# Patient Record
Sex: FEMALE | Race: WHITE | Employment: FULL TIME | URBAN - METROPOLITAN AREA
[De-identification: names, ages, dates, MRNs, and addresses within clinical notes are randomized per-mention and may not be internally consistent; named-entity substitution may affect disease eponyms.]

---

## 2019-02-19 PROBLEM — E78.2 MIXED HYPERLIPIDEMIA: Status: ACTIVE | Noted: 2019-02-19

## 2019-10-21 PROBLEM — R20.2 PARESTHESIA: Status: ACTIVE | Noted: 2019-10-21

## 2019-10-21 PROBLEM — M21.372 FOOT DROP, LEFT FOOT: Status: ACTIVE | Noted: 2019-10-21

## 2019-10-21 PROBLEM — R29.3 POSTURAL IMBALANCE: Status: ACTIVE | Noted: 2019-10-21

## 2019-10-21 PROBLEM — Z79.899 ENCOUNTER FOR MEDICATION MANAGEMENT: Status: ACTIVE | Noted: 2019-10-21

## 2019-10-21 PROBLEM — R29.898 WEAKNESS OF BOTH LEGS: Status: ACTIVE | Noted: 2019-10-21

## 2019-11-01 ENCOUNTER — HOSPITAL ENCOUNTER (OUTPATIENT)
Dept: PHYSICAL THERAPY | Age: 44
Discharge: HOME | End: 2019-11-01
Payer: COMMERCIAL

## 2019-11-01 DIAGNOSIS — R29.3 POSTURAL IMBALANCE: ICD-10-CM

## 2019-11-01 DIAGNOSIS — M21.372 FOOT DROP, LEFT FOOT: ICD-10-CM

## 2019-11-01 PROCEDURE — 97162 PT EVAL MOD COMPLEX 30 MIN: CPT

## 2019-11-01 PROCEDURE — 97110 THERAPEUTIC EXERCISES: CPT

## 2019-11-01 NOTE — THERAPY EVALUATION
Jamin Heredia  : 1975  Payor: [id number] / Plan: [address] / Product Type: PPO /    [address] [address] [facility], [facility], [address] [place] [address]  [phone]   [phone]      Visit Count:  1   OUTPATIENT PHYSICAL THERAPY:Initial Assessment 2019      ICD-10: Treatment Diagnosis:   Difficulty in walking, Not elsewhere classified (R26.2)  Postural imbalance [R29.3]  Foot drop, left foot [M21.372]    Precautions/Allergies:   Patient has no known allergies. MD Orders: evaluate and treat MEDICAL/REFERRING DIAGNOSIS:  Postural imbalance [R29.3]  Foot drop, left foot [M21.372]   DATE OF ONSET: 3 years ago, progressively worsening  REFERRING PHYSICIAN: Anna Hamilton MD  RETURN PHYSICIAN APPOINTMENT: not specified      INITIAL ASSESSMENT:  Ms. Nataliya Hanna presents with functional limitations due to B neuropathy lower legs causing significant weakness affecting gait. PT evaluation reveals severe weakness of left ankle dorsi flexion, restricted ROM of left ankle, decreased sensation of B lower legs as well as absence of DTR's. Some improvement noted through interventions performed today showing pt will benefit from skilled PT and may need orthotic intervention to address current problems return to safe community and home ambulation. PROBLEM LIST (Impacting functional limitations):  1. Decreased Strength  2. Decreased ADL/Functional Activities  3. Decreased Ambulation Ability/Technique  4. Decreased Balance  5. Decreased Flexibility/Joint Mobility  6. Decreased Clinch with Home Exercise Program INTERVENTIONS PLANNED:  1. Cold  2. Electrical Stimulation  3. Gait Training  4. Home Exercise Program (HEP)  5. Manual Therapy  6. Neuromuscular Re-education/Strengthening  7. Range of Motion (ROM)  8. Therapeutic Activites  9. Therapeutic Exercise/Strengthening     TREATMENT PLAN:  Effective Dates: 2019 TO 2019 (60 days). Frequency/Duration: 1-2 times a week for 60 Days    GOALS: (Goals have been discussed and agreed upon with patient.)  SHORT-TERM FUNCTIONAL GOALS: Time Frame: 2-4 weeks   1. Pt will be independent with HEP focusing on LE ROM, strength as well as proprioception. 2. Pt will demonstrate passive left ankle DF to 10 deg to allow proper gait mechanics. DISCHARGE GOALS: Time Frame: 6-8 weeks   1. Pt will improve FAAM score by 10 points. 2. Pt will demonstrate active left ankle DF to 5 degrees to improve gait mechanics. 3. Pt will demonstrate at least 4-/5 left ankle strength all planes. 4. Pt will demonstrate functional heel strike during gait on all surfaces to decrease fall risk. Rehabilitation Potential For Stated Goals: Good  . Ambulatory/Rehab Services H2 Model Falls Risk Assessment    Risk Factors:       No Risk Factors Identified Ability to Rise from Chair:       (0)  Ability to rise in a single movement    Falls Prevention Plan:       No modifications necessary   Total: (5 or greater = High Risk): 0    ©2010 [facility]. All Rights Reserved. [facility] Patent #[id number]. Federal Law prohibits the replication, distribution or use without written permission from [facility]     Outcome Measure: Tool Used: FOOT AND ANKLE ABILITY MEASURE (FAAM)  Score:  Initial: 66/116 Most Recent: X (Date: -- )   Interpretation of Score: For the \"Activities of Daily Living\", there are 21 questions each scored on a 5 point scale with 0 representing \"Unable to do\" and 4 representing \"No difficulty\". The lower the score, the greater the functional disability. 84/84 represents no disability. Minimal detectable change is 5.7 points. With the addition of the 8 questions in the \"Sports Subscale,\" there are 29 questions, each scored on a 5 point scale with 0 representing \"Unable to do\" and 4 representing \"No difficulty\". The lower the score, the greater the functional disability.  116/116 represents no disability. Minimal detectable change is 12.3 points. Medical Necessity:   · Patient is expected to demonstrate progress in strength, range of motion, balance and functional technique to improve safety during gait and ADLs. Reason for Services/Other Comments:  · Patient continues to require modification of therapeutic interventions to increase complexity of exercises. Regarding Yvonne Bello's therapy, I certify that the treatment plan above will be carried out by a therapist or under their direction. Thank you for this referral,  Curvin Apley, PT     Referring Physician Signature: Hemalatha Ward MD              Date                    The information in this section was collected on 2019    (except where otherwise noted). HISTORY:   History of Present Injury/Illness (Reason for Referral):  Pt states hx of uncontrolled diabetes with with neuropathy affecting B lower legs x last 3 years. She has noticed her progressively worsening left sided drop foot affecting gait and balnace. Primary care MD referred pt to neurologist.  NVC performed, no results discussed with patient as of yet. Referred to PT. Pt does state that after nerve stimulation from NVC, she noticed a short term improvement with her gait. She reports occasional falls over last few years, last beign on stairs 4 months ago. Present symptoms (on day of initial evaluation): decreased sensation B lower legs light touch, right ankle weakness, gait disturbance   · Pain level: no significant pain     Past Medical History/Comorbidities:   Ms. Audrey De Guzman  has a past medical history of Diabetic peripheral neuropathy associated with type 2 diabetes mellitus, Nonproliferative diabetic retinopathy, and Type 2 diabetes mellitus, uncontrolled. Ms. Audrey De Guzman  has a past surgical history that includes hx other surgical () and hx  section ( and ).   Social History/Living Environment:    lives with family in 2 story home with basement  Prior Level of Function/Work/Activity:  attendance clerk at [employer]    Dominant Side:         RIGHT  Other Clinical Tests:          NCV  Previous Treatment Approaches:          None   Current Medications:       Current Outpatient Medications:     ergocalciferol (ERGOCALCIFEROL) 50,000 unit capsule, Take 1 Cap by mouth two (2) times a week on Wednesday and Saturday. , Disp: 24 Cap, Rfl: 0    FREESTYLE MILIND 14 DAY READER misc, Use to check blood glucose 4 times daily E11.65, Disp: 1 Each, Rfl: 0    FREESTYLE MILIND 14 DAY SENSOR kit, Use to check blood glucose 4 times daily E11.65, Disp: 2 Kit, Rfl: 11    TRULICITY 8.87 VR/9.6 mL sub-q pen, 0.5 mL by SubCUTAneous route every seven (7) days. , Disp: 8 Pen, Rfl: 11    ONETOUCH VERIO HIGH CONTROL soln, Use with onetouch verio meter,  E11.65, Disp: 1 Each, Rfl: 1    ONETOUCH VERIO MID CONTROL soln, Use with onetouch verio meter,  E11.65, Disp: 1 mL, Rfl: 1    HUMALOG KWIKPEN INSULIN 100 unit/mL kwikpen, Use with correction 3/50 >150, max daily dose 50 units, Disp: 15 Pen, Rfl: 3    metFORMIN ER (GLUCOPHAGE XR) 750 mg tablet, Take 1 Tab by mouth daily. , Disp: 60 Tab, Rfl: 11    TRESIBA FLEXTOUCH U-200 200 unit/mL (3 mL) inpn, 80 Units by SubCUTAneous route daily. , Disp: 12 Pen, Rfl: 3    ondansetron hcl (ZOFRAN) 8 mg tablet, Take 1 Tab by mouth every eight (8) hours as needed for Nausea., Disp: 9 Tab, Rfl: 11    aspirin/acetaminophen/caffeine (EXCEDRIN MIGRAINE PO), Take 1 Tab by mouth as needed. , Disp: , Rfl:     JARDIANCE 10 mg tablet, Take 1 Tab by mouth daily. , Disp: 30 Tab, Rfl: 11    atorvastatin (LIPITOR) 20 mg tablet, Take 1 Tab by mouth daily. , Disp: 30 Tab, Rfl: 11    LOTEMAX 0.5 % ophthalmic suspension, INSTILL 1 TO 2 DROPS IN INTO LEFT EYE EVERY 6 HOURS, Disp: , Rfl: 0    ONETOUCH VERIO strip, Check blood glucose three times daily.   Dx E11.65, Disp: 300 Strip, Rfl: 3    ONE TOUCH DELICA 33 gauge misc, Check blood glucose three times daily. Dx E11.65, Disp: 300 Lancet, Rfl: 3    FRANSISCO PEN NEEDLE 32 gauge x 5/32\" ndle, 2 injections per day. Dx E11.65, Disp: 200 Pen Needle, Rfl: 3    gabapentin (NEURONTIN) 300 mg capsule, Take 1 Cap by mouth two (2) times a day. (Patient taking differently: Take 300 mg by mouth as needed.), Disp: 60 Cap, Rfl: 11   Date Last Reviewed:  11/1/2019     Number of Personal Factors/Comorbidities that affect the Plan of Care: 1-2: MODERATE COMPLEXITY   EXAMINATION:   Observation/Orthostatic Postural Assessment:          Some clawing of right toes with weightbearing         B first toes mild rubor        No edema noted of B lower legs    Palpation:          Decreased sensation, no report of pain. ROM:            Active  Date:  11/1/19 Date:   Date:     Direction  Parameters Parameters Parameters   Hip WNL      Knee  WNL     Ankle DF R: 10  L: - 20 deg     Ankle PF R: 60  L: 60     gastroc  R: WNL   L: tight      soleus R: WNL  L: tight              Strength:            Lower quadrant    DATE  11/1/19 DATE     Hip  R: 4+ all planes   L: 4+ all planes  R:   L:    Knee Flexion  R: 4+  L: 4- R:   L:    Knee Extension  R: 4+   L: 4+ R:   L:    Ankle Dorsiflexion  R: 4+  L: 3- R:   L:   Ankle Plantarflexion  R: 4+  L: 4+ R:  L:   Ankle IV R: 4  L: 3+    Ankle EV R: 4  L: 3    Hallux extension  R: 3+  L: 2      Neurological Screen:         Reflexes:  0 patellar and 0 achilles B        Sensation: light touch deiminished medial, dorsum, and planter aspects B feet    Functional Mobility:         Gait/Ambulation:  Absent initial heel strike, no LOB        Transfers:  No use of UE sit to stand         Bed Mobility:  Independent         Stairs:  Not assessed, subjectively step to gait   Body Structures Involved:  1. Nerves  2. Joints  3. Muscles  4. Ligaments Body Functions Affected:  1. Sensory/Pain  2. Neuromusculoskeletal  3. Movement Related Activities and Participation Affected:  1. Mobility  2. Self Care  3.  Domestic Life   Number of elements that affect the Plan of Care: 3: MODERATE COMPLEXITY   CLINICAL PRESENTATION:   Presentation: Evolving clinical presentation with changing clinical characteristics: MODERATE COMPLEXITY   CLINICAL DECISION MAKING:   Use of outcome tool(s) and clinical judgement create a POC that gives a: Questionable prediction of patient's progress: MODERATE COMPLEXITY

## 2019-11-01 NOTE — PROGRESS NOTES
Rand Wilson  : 1975  [facility]  Secondary:  [address] at . [address]  [address]. [address], [address]  [phone]   [phone]    Visit Count:  1   OUTPATIENT PHYSICAL THERAPY:  Daily Treatment Note  2019     Treatment Diagnosis:   Difficulty in walking, Not elsewhere classified (R26.2)  Postural imbalance [R29.3]  Foot drop, left foot [M21.372]     Precautions/Allergies:   Patient has no known allergies.      MD Orders: evaluate and treat MEDICAL/REFERRING DIAGNOSIS:  Postural imbalance [R29.3]  Foot drop, left foot [M21.372]   DATE OF ONSET: 3 years ago, progressively worsening  REFERRING PHYSICIAN: Anastasiia Villalpando MD  RETURN PHYSICIAN APPOINTMENT: not specified      Pre-treatment Symptoms/Complaints:  Gait and balance deficit due to drop foot   Pain: Initial:   0 Post Session:  0/10   Medications Last Reviewed:  2019    Updated Objective Findings:  see evaluation      TREATMENT:     THERAPEUTIC EXERCISE: (20 minutes):  Exercises per grid below to improve mobility, strength and balance. Required moderate visual, verbal, manual and tactile cues to promote proper body alignment, promote proper body posture and promote proper body mechanics. Progressed resistance, range and repetitions as indicated. Date:  2019   Date:   Date:     Activity/Exercise Parameters Parameters Parameters   Education  POC, HEP     Ankle AROM all planes  B x 20      Toe towel crunch  X 30 sec B      gastroc stretch X 20 sec B     Soleus stretch  X 20 sec B                     [employer] Portal  Treatment/Session Summary:    · Response to Treatment:  good understading of all above. .  · Communication/Consultation:  evaluation sent to MD   · Equipment provided today:  handout for HEP        Recommendations/Intent for next treatment session: Next visit will focus on ankle and foot mobiltiy, gait, nerve stim.         Treatment Plan of Care Effective Dates: 11/1/19 to 12/31/2019          Total Treatment Billable Duration:  20 min + evaluation    PT Patient Time In/Time Out  Time In: 0930  Time Out: [address], PT    Future Appointments   Date Time Provider Liliam Jovel   11/5/2019  4:00 PM Maurilio Opitz END BS ENDO   11/8/2019 10:30 AM [facility] Atwood[facility]   11/15/2019 10:30 AM [facility]   11/22/2019 10:30 AM Ghassan Atwood, PT [facility]   11/27/2019  1:00 PM Briana Gomez, PT [facility]

## 2019-11-08 ENCOUNTER — HOSPITAL ENCOUNTER (OUTPATIENT)
Dept: PHYSICAL THERAPY | Age: 44
Discharge: HOME | End: 2019-11-08
Payer: COMMERCIAL

## 2019-11-08 PROCEDURE — 97032 APPL MODALITY 1+ESTIM EA 15: CPT

## 2019-11-08 PROCEDURE — 97110 THERAPEUTIC EXERCISES: CPT

## 2019-11-08 NOTE — PROGRESS NOTES
Joshua Pride  : 1975  Primary: Mekhi Hubbard [facility]  Secondary:  [address] at . [address]  [address]. [address], [address]  [phone]   [phone]    Visit Count:  2   OUTPATIENT PHYSICAL THERAPY:  Daily Treatment Note  2019     Treatment Diagnosis:   Difficulty in walking, Not elsewhere classified (R26.2)  Postural imbalance [R29.3]  Foot drop, left foot [M21.372]     Precautions/Allergies:   Patient has no known allergies.      MD Orders: evaluate and treat MEDICAL/REFERRING DIAGNOSIS:  Postural imbalance [R29.3]  Foot drop, left foot [M21.372]   DATE OF ONSET: 3 years ago, progressively worsening  REFERRING PHYSICIAN: Johnny Nuñez MD  RETURN PHYSICIAN APPOINTMENT: not specified      Pre-treatment Symptoms/Complaints: pt states that she got to HEP at least 1 time per day. Isidore  yesterday when was not paying attention and didn't realize ac curb was near her. Abrasions noted of left anterolateral ankle, covered with island bandage   Pain: Initial:   0 Post Session:  0/10   Medications Last Reviewed:  2019    Updated Objective Findings:  see evaluation      TREATMENT:     THERAPEUTIC EXERCISE: (40 minutes):  Exercises per grid below to improve mobility, strength and balance. Required moderate visual, verbal, manual and tactile cues to promote proper body alignment, promote proper body posture and promote proper body mechanics. Progressed resistance, range and repetitions as indicated.    Date:  2019   Date:  19 Date:     Activity/Exercise Parameters Parameters Parameters   Education  POC, HEP Weight bearing, and gait mechanics     Ankle AROM all planes  B x 20  On baps board     Toe towel crunch  X 30 sec B      gastroc stretch X 20 sec B On slant board     Soleus stretch  X 20 sec B     Slant board  2 x 30 sec     baps board  DF/PF x 10     nustep   X 10 min     Step taps  X 10 B, working on balance as well as functional ankle DF Proprioception activities   50%-75% weightbearing through left LE with eyes closed and finger touch on elevated table. Gait   100ft level ground focus on heel strike     Calf raise  Seated x 10 B       Modalities: 20 min (with set up)  [place] E-stim x 10 min over left anterior tib motor points to promote activation and strength of muscle. · 1 channel, 2 electrodes, prox ant tib and 3 inches distal, 100 mAmp, 3 sec ramp, 5 sec on, 10 sec off    [employer] Portal  Treatment/Session Summary:    · Response to Treatment:  Pt demonstrated improved heel strike left lower leg after E-stim. Above exercises fatiguing . Pt required multiple cues to decrease right toe clawing during standing activities. Static standing exercise was challenging with increased weight bearing through left LE---poor ankle strategy. · Communication/Consultation:  Encouraged continue HEP as well as focus on gait and static standing weightbearing (equal)  · Equipment provided today:  none      Recommendations/Intent for next treatment session: Next visit will focus on functional ankle and foot mobiltiy/strength as [place] stim as needed for nerve stim.       Treatment Plan of Care Effective Dates:  11/1/19 to 12/31/2019      Total Treatment Billable Duration:   60 min   PT Patient Time In/Time Out  Time In: 1030  Time Out: [address] PT    Future Appointments   Date Time Provider Liliam Jovel   11/15/2019 10:30 AM Ursula Hernandez PT [facility]   11/22/2019 10:30 AM [facility]   11/27/2019  1:00 PM [facility]   2/12/2020 11:00 AM Angélica Busch, PABOB END BS ENDO

## 2019-11-15 ENCOUNTER — HOSPITAL ENCOUNTER (OUTPATIENT)
Dept: PHYSICAL THERAPY | Age: 44
Discharge: HOME | End: 2019-11-15
Payer: COMMERCIAL

## 2019-11-15 NOTE — PROGRESS NOTES
Jose Cruz Curry  : 1975  [facility]  Secondary:  [address] at . [address]  [address]. [address], [address]  [phone]   [phone]        OUTPATIENT DAILY NOTE    NAME/AGE/GENDER: Jose Cruz Curry is a 38 y.o. female. DATE: 11/15/2019    Ms. Radha Myers for today's appointment due to unknown reason.     Robbie Hampton, PT

## 2019-11-22 ENCOUNTER — HOSPITAL ENCOUNTER (OUTPATIENT)
Dept: PHYSICAL THERAPY | Age: 44
Discharge: HOME | End: 2019-11-22
Payer: COMMERCIAL

## 2019-11-22 PROCEDURE — 97140 MANUAL THERAPY 1/> REGIONS: CPT

## 2019-11-22 PROCEDURE — 97110 THERAPEUTIC EXERCISES: CPT

## 2019-11-22 NOTE — PROGRESS NOTES
Josh Schreiber  : 1975  Primary: Erma [facility]acon [facility]  Secondary:  [address] at . [address]  [address]. [address], [address]  [phone]   [phone]    Visit Count:  3   OUTPATIENT PHYSICAL THERAPY:  Daily Treatment Note  2019     Treatment Diagnosis:   Difficulty in walking, Not elsewhere classified (R26.2)  Postural imbalance [R29.3]  Foot drop, left foot [M21.372]     Precautions/Allergies:   Patient has no known allergies.      MD Orders: evaluate and treat MEDICAL/REFERRING DIAGNOSIS:  Postural imbalance [R29.3]  Foot drop, left foot [M21.372]   DATE OF ONSET: 3 years ago, progressively worsening  REFERRING PHYSICIAN: Eva Garcia MD  RETURN PHYSICIAN APPOINTMENT: not specified      Pre-treatment Symptoms/Complaints: pt states that she is trying to stay consistent with HEP but very busy at home. Reports missed last appointment due to work engagement. Pain: Initial:   0 Post Session:  0/10   Medications Last Reviewed:  2019    Updated Objective Findings:  see evaluation      TREATMENT:     THERAPEUTIC EXERCISE: (35 minutes):  Exercises per grid below to improve mobility, strength and balance. Required moderate visual, verbal, manual and tactile cues to promote proper body alignment, promote proper body posture and promote proper body mechanics. Progressed resistance, range and repetitions as indicated.    Date:  2019   Date:  19 Date:  19   Activity/Exercise Parameters Parameters Parameters   Education  POC, HEP Weight bearing, and gait mechanics     Ankle AROM all planes  B x 20  On baps board  With YTB DF and PF B ( left without band DF)   Toe towel crunch  X 30 sec B   2 x 1 min B   gastroc stretch X 20 sec B On slant board  On slant board 2 x 30 sec    Soleus stretch  X 20 sec B     Slant board  2 x 30 sec  2 x 20 sec    baps board  DF/PF x 10     nustep   X 10 min  X 10 min    Step taps  X 10 B, working on balance as well as functional ankle DF    Proprioception activities   50%-75% weightbearing through left LE with eyes closed and finger touch on elevated table. Gait   100ft level ground focus on heel strike     Calf raise  Seated x 10 B     Towel IV/EV   2 x 1 min    Squats    NV   Side steps with band    X 5 min    Manual: (8 min): to improve joint mechinics and increase ROM   Hallux extension stretching  Manual OP with ankle DF     Modalities: 0 min (with set up)----not today  [place] E-stim x 10 min over left anterior tib motor points to promote activation and strength of muscle. · 1 channel, 2 electrodes, prox ant tib and 3 inches distal, 100 mAmp, 3 sec ramp, 5 sec on, 10 sec off    [employer] Portal  Treatment/Session Summary:    · Response to Treatment:   Pt reports quick fatigue of B LE with exercise today. Pt continues to present with slow healing wound on ankle--observation: open, min yellow drainage. Encouraged pt to keep covered if activity draining/open. Today I covered wound with sterile absorbant nonadhesive pad and wrapped with Kerlix secured with tape. Discussed keeping aware of wound and alerting MD if changes appearance, smell or drainage. · Communication/Consultation:  Encouraged continue HEP as well as focus on gait and static standing weightbearing (equal)  · Equipment provided today:  t-band and HEP handout       Recommendations/Intent for next treatment session: Next visit will focus on functional ankle and foot mobiltiy/strength as [place] stim (?)as needed for nerve stim.       Treatment Plan of Care Effective Dates:  11/1/19 to 12/31/2019      Total Treatment Billable Duration:  43 min   PT Patient Time In/Time Out  Time In: 1030  Time Out: [address], PT    Future Appointments   Date Time Provider Liliam Jovel   11/27/2019  1:00 PM Dinorah Mccarthy, PT [facility]   2/12/2020 11:00 AM Liliana Busch PA-C END BS ENDO

## 2019-11-27 ENCOUNTER — HOSPITAL ENCOUNTER (OUTPATIENT)
Dept: PHYSICAL THERAPY | Age: 44
Discharge: HOME | End: 2019-11-27
Payer: COMMERCIAL

## 2019-11-27 PROCEDURE — 97110 THERAPEUTIC EXERCISES: CPT

## 2019-11-27 NOTE — PROGRESS NOTES
Saskia Scott  : 1975  Primary: [facility]  Secondary:  [address] at . Mikala [address]  [address]. [address], [email or web address]  [phone]   [phone]    Visit Count:  4   OUTPATIENT PHYSICAL THERAPY:  Daily Treatment Note  2019     Treatment Diagnosis:   Difficulty in walking, Not elsewhere classified (R26.2)  Postural imbalance [R29.3]  Foot drop, left foot [M21.372]     Precautions/Allergies:   Patient has no known allergies.      MD Orders: evaluate and treat MEDICAL/REFERRING DIAGNOSIS:  Postural imbalance [R29.3]  Foot drop, left foot [M21.372]   DATE OF ONSET: 3 years ago, progressively worsening  REFERRING PHYSICIAN: Zhang Massey MD  RETURN PHYSICIAN APPOINTMENT: not specified      Pre-treatment Symptoms/Complaints: pt states that she is consistent with HEP but still feels weak of LE and has to really focus to walk correctly. She feels like she is more aware of her gait mechanics but does not feel like she is making much progress. She states that she is always very sore of entire LE's few days after PT sessions. Pain: Initial:   0 Post Session:  0/10   Medications Last Reviewed:  2019    Updated Objective Findings:  see evaluation      TREATMENT:     THERAPEUTIC EXERCISE: (40 minutes):  Exercises per grid below to improve mobility, strength and balance. Required moderate visual, verbal, manual and tactile cues to promote proper body alignment, promote proper body posture and promote proper body mechanics. Progressed resistance, range and repetitions as indicated.    Date:  2019   Date:  19 Date:  19   Activity/Exercise Parameters Parameters Parameters    Education  POC, HEP Weight bearing, and gait mechanics   HEP--adding 20 min of cardio every day (bike or elliptical)    Ankle AROM all planes  B x 20  On baps board  With YTB DF and PF B ( left without band DF)    Toe towel crunch  X 30 sec B   2 x 1 min B gastroc stretch X 20 sec B On slant board  On slant board 2 x 30 sec  On slant board   2 x 30 sec    Soleus stretch  X 20 sec B   On slant board 2 x 30 sec    Slant board  2 x 30 sec  2 x 20 sec     baps board  DF/PF x 10      nustep   X 10 min  X 10 min  10 min    Step taps  X 10 B, working on balance as well as functional ankle DF     Proprioception activities   50%-75% weightbearing through left LE with eyes closed and finger touch on elevated table. Gait   100ft level ground focus on heel strike   4 x 40 ft    Calf raise  Seated x 10 B   On shuttle    Towel IV/EV   2 x 1 min     Squats    NV On shuttle   Side steps with band    X 5 min     Shuttle     Leg press: 50# DL x 20   50# SL x 10     Calf raise:   50# x 20                         Manual: (0min): to improve joint mechinics and increase ROM   · Hallux extension stretching  · Manual OP with ankle DF     Modalities: 0 min (with set up)----not today  [place] E-stim x 10 min over left anterior tib motor points to promote activation and strength of muscle. · 1 channel, 2 electrodes, prox ant tib and 3 inches distal, 100 mAmp, 3 sec ramp, 5 sec on, 10 sec off    [employer] Portal  Treatment/Session Summary:    · Response to Treatment:  Pt continues to report significant fatigue of B LE after minimal activities. Pt demonstrates ability to ambulate with good heel strike B x 100 ft on level surface prior to fatigue of dorsiflexors--this is a big improvement from initial evaluation. .     · Communication/Consultation:  Discussed continuing PT x 2-4 more weeks due to evidence of further potential progress. Reinforced need for consistency with HEP during this time as well  · Equipment provided today:        Recommendations/Intent for next treatment session: continue to focus on LE strength and endurance as well as overall balance and motor control.        Treatment Plan of Care Effective Dates:  11/1/19 to 12/31/2019      Total Treatment Billable Duration: 40 min   PT Patient Time In/Time Out  Time In: 1300  Time Out: [id number], PT    Future Appointments   Date Time Provider Liliam Jovel   12/11/2019 10:30 AM Dinorah Mccarthy PT [facility]   12/18/2019 10:30 AM Dinorah Mccarthy PT [facility]   2/12/2020 11:00 AM Liliana Busch PA-C END [facility] END[facility]

## 2019-12-11 ENCOUNTER — HOSPITAL ENCOUNTER (OUTPATIENT)
Dept: PHYSICAL THERAPY | Age: 44
Discharge: HOME | End: 2019-12-11
Payer: COMMERCIAL

## 2019-12-11 PROCEDURE — 97110 THERAPEUTIC EXERCISES: CPT

## 2019-12-11 NOTE — PROGRESS NOTES
Johanna Grayson  : 1975  [facility]  Secondary:  [address] at . Mik[address]  [address]. [address], [address]  [phone]   [phone]    Visit Count:  5   OUTPATIENT PHYSICAL THERAPY:  Daily Treatment Note  2019     Treatment Diagnosis:   Difficulty in walking, Not elsewhere classified (R26.2)  Postural imbalance [R29.3]  Foot drop, left foot [M21.372]     Precautions/Allergies:   Patient has no known allergies.      MD Orders: evaluate and treat MEDICAL/REFERRING DIAGNOSIS:  Postural imbalance [R29.3]  Foot drop, left foot [M21.372]   DATE OF ONSET: 3 years ago, progressively worsening  REFERRING PHYSICIAN: Aaron Gutierres MD  RETURN PHYSICIAN APPOINTMENT: not specified      Pre-treatment Symptoms/Complaints: pt with no new c/os. Been working on [employer] consistently. Pain: Initial:   0 Post Session:  0/10   Medications Last Reviewed:  2019    Updated Objective Findings:  see evaluation      TREATMENT:     THERAPEUTIC EXERCISE: (40 minutes):  Exercises per grid below to improve mobility, strength and balance. Required moderate visual, verbal, manual and tactile cues to promote proper body alignment, promote proper body posture and promote proper body mechanics. Progressed resistance, range and repetitions as indicated.    Date:  2019   Date:  19 Date:  19   Activity/Exercise Parameters Parameters Parameters     Education  POC, HEP Weight bearing, and gait mechanics   HEP--adding 20 min of cardio every day (bike or elliptical)     Ankle AROM all planes  B x 20  On baps board  With YTB DF and PF B ( left without band DF)    DF seated    Toe towel crunch  X 30 sec B   2 x 1 min B     gastroc stretch X 20 sec B On slant board  On slant board 2 x 30 sec  On slant board   2 x 30 sec  On slant board x 20 sec    Soleus stretch  X 20 sec B   On slant board 2 x 30 sec  On slant board 2 x 30 sec    Slant board 2 x 30 sec  2 x 20 sec      baps board  DF/PF x 10    X DF/PF, IV/EV   nustep   X 10 min  X 10 min  10 min  10 min level 4   Step taps  X 10 B, working on balance as well as functional ankle DF      Proprioception activities   50%-75% weightbearing through left LE with eyes closed and finger touch on elevated table. BF static standing: EO/EC, WBOS and NBOS total of 6 min    Gait   100ft level ground focus on heel strike   4 x 40 ft  4 x 40 ft    Calf raise  Seated x 10 B   On shuttle     Towel IV/EV   2 x 1 min      Squats    NV On shuttle    Side steps with band    X 5 min      Shuttle     Leg press: 50# DL x 20   50# SL x 10     Calf raise:   50# x 20     Squats      With towel roll under toes for hallux extension and AP grade IV mobilization   X 10                   Manual: (0min): to improve joint mechinics and increase ROM   · Hallux extension stretching  · Manual OP with ankle DF     Modalities: 0 min (with set up)----not today  [place] E-stim x 10 min over left anterior tib motor points to promote activation and strength of muscle. · 1 channel, 2 electrodes, prox ant tib and 3 inches distal, 100 mAmp, 3 sec ramp, 5 sec on, 10 sec off    [employer] Portal  Treatment/Session Summary:    · Response to Treatment:  Pt continues to report significant fatigue of B LE after minimal activities. She demonstrates improved DF of left ankle. Balance activities challenging and needed cuing to increase weight bearing through heels and right LE as well as decrease clawing of right toes. .     · Communication/Consultation:    · Equipment provided today:  Non      Recommendations/Intent for next treatment session: continue to focus on LE strength and endurance as well as overall balance and motor control.        Treatment Plan of Care Effective Dates:  11/1/19 to 12/31/2019    Total Treatment Billable Duration:  40 min   PT Patient Time In/Time Out  Time In: 1030  Time Out: [address], PT    Future Appointments   Date Time Provider Liliam Jovel   12/18/2019 10:30 AM Nilo Zavala PT [facility]   2/12/2020 11:00 AM China Busch PA-C [facility]

## 2019-12-18 ENCOUNTER — HOSPITAL ENCOUNTER (OUTPATIENT)
Dept: PHYSICAL THERAPY | Age: 44
Discharge: HOME | End: 2019-12-18
Payer: COMMERCIAL

## 2019-12-18 PROCEDURE — 97110 THERAPEUTIC EXERCISES: CPT

## 2019-12-18 NOTE — PROGRESS NOTES
Ofelia Reyes  : 1975  Primary: Srinath Rios  Secondary:  [address] at . [address]  [address]. [address], [address]  [phone]   [phone]    Visit Count:  6   OUTPATIENT PHYSICAL THERAPY:  Daily Treatment Note  2019     Treatment Diagnosis:   Difficulty in walking, Not elsewhere classified (R26.2)  Postural imbalance [R29.3]  Foot drop, left foot [M21.372]     Precautions/Allergies:   Patient has no known allergies.      MD Orders: evaluate and treat MEDICAL/REFERRING DIAGNOSIS:  Postural imbalance [R29.3]  Foot drop, left foot [M21.372]   DATE OF ONSET: 3 years ago, progressively worsening  REFERRING PHYSICIAN: Monty Carver MD  RETURN PHYSICIAN APPOINTMENT: not specified      Pre-treatment Symptoms/Complaints: Have been sick so her balance has been off this week. No significant changes at this time. Doing HEP but not cardio yet because cannot get to her ecliptical. Pt states that she is doing other exercises consistently. She also reports that she is going out of town until January and will perform her HEP on her own and call MD if needs to return      Pain: Initial:   0 Post Session:  0/10   Medications Last Reviewed:  2019    Updated Objective Findings:  see evaluation      TREATMENT:     THERAPEUTIC EXERCISE: (40 minutes):  Exercises per grid below to improve mobility, strength and balance. Required moderate visual, verbal, manual and tactile cues to promote proper body alignment, promote proper body posture and promote proper body mechanics. Progressed resistance, range and repetitions as indicated.    Date:  2019   Date:  19 Date:  19   Activity/Exercise Parameters Parameters Parameters      Education  POC, HEP Weight bearing, and gait mechanics   HEP--adding 20 min of cardio every day (bike or elliptical)      Ankle AROM all planes  B x 20  On baps board  With YTB DF and PF B ( left without band DF)    DF seated  With wobble board x 10 min    Toe towel crunch  X 30 sec B   2 x 1 min B      gastroc stretch X 20 sec B On slant board  On slant board 2 x 30 sec  On slant board   2 x 30 sec  On slant board x 20 sec     Soleus stretch  X 20 sec B   On slant board 2 x 30 sec  On slant board 2 x 30 sec     Slant board  2 x 30 sec  2 x 20 sec       baps board  DF/PF x 10    X DF/PF, IV/EV All planes ankle    nustep   X 10 min  X 10 min  10 min  10 min level 4 10 min level 4    Step taps  X 10 B, working on balance as well as functional ankle DF    Step ups x 20    Proprioception activities   50%-75% weightbearing through left LE with eyes closed and finger touch on elevated table. BF static standing: EO/EC, WBOS and NBOS total of 6 min     Gait   100ft level ground focus on heel strike   4 x 40 ft  4 x 40 ft  4 x 40 ft   Calf raise  Seated x 10 B   On shuttle      Towel IV/EV   2 x 1 min       Squats    NV On shuttle     Side steps with band    X 5 min       Shuttle     Leg press: 50# DL x 20   50# SL x 10     Calf raise:   50# x 20      Squats      With towel roll under toes for hallux extension and AP grade IV mobilization   X 10 X 10    Ladder drills       Weaving, fwd, side step x 10 min    Heel and toe walk      2 x  40ft      [facility] Portal  Treatment/Session Summary:    · Response to Treatment:  Pt continues to report significant fatigue of B LE after minimal activities, encouraged pt to continue performing difficult activiteis at home to build endurance. Cuing also needed to decrease hyper extension of knees and increase heel strike with walking.    .     ·        Recommendations/Intent for next treatment session: discharged today       Treatment Plan of Care Effective Dates:  11/1/19 to 12/31/2019    Total Treatment Billable Duration:  40 min      Sneha Vazquez PT    Future Appointments   Date Time Provider Liliam Jovel   12/18/2019 10:30 AM Miryam Delatorre, PT [facility]   2/12/2020 11:00 AM Yolanda Busch PA-C [facility]

## 2020-03-12 ENCOUNTER — HOSPITAL ENCOUNTER (EMERGENCY)
Age: 45
Discharge: HOME | End: 2020-03-12
Attending: EMERGENCY MEDICINE
Payer: COMMERCIAL

## 2020-03-12 ENCOUNTER — APPOINTMENT (OUTPATIENT)
Dept: GENERAL RADIOLOGY | Age: 45
End: 2020-03-12
Attending: EMERGENCY MEDICINE
Payer: COMMERCIAL

## 2020-03-12 VITALS
DIASTOLIC BLOOD PRESSURE: 79 MMHG | OXYGEN SATURATION: 98 % | HEART RATE: 91 BPM | RESPIRATION RATE: 18 BRPM | SYSTOLIC BLOOD PRESSURE: 135 MMHG | BODY MASS INDEX: 32.54 KG/M2 | WEIGHT: 214 LBS | TEMPERATURE: 98 F

## 2020-03-12 DIAGNOSIS — L03.031 CELLULITIS OF TOE OF RIGHT FOOT: Primary | ICD-10-CM

## 2020-03-12 DIAGNOSIS — R73.9 HYPERGLYCEMIA: ICD-10-CM

## 2020-03-12 LAB
ALBUMIN SERPL-MCNC: 3.5 G/DL (ref 3.5–5)
ALBUMIN/GLOB SERPL: 0.8 {RATIO} (ref 1.2–3.5)
ALP SERPL-CCNC: 86 U/L (ref 50–136)
ALT SERPL-CCNC: 17 U/L (ref 12–65)
ANION GAP SERPL CALC-SCNC: 7 MMOL/L (ref 7–16)
AST SERPL-CCNC: 19 U/L (ref 15–37)
BASOPHILS # BLD: 0.1 K/UL (ref 0–0.2)
BASOPHILS NFR BLD: 1 % (ref 0–2)
BILIRUB SERPL-MCNC: 0.4 MG/DL (ref 0.2–1.1)
BUN SERPL-MCNC: 8 MG/DL (ref 6–23)
CALCIUM SERPL-MCNC: 8.7 MG/DL (ref 8.3–10.4)
CHLORIDE SERPL-SCNC: 97 MMOL/L (ref 98–107)
CO2 SERPL-SCNC: 26 MMOL/L (ref 21–32)
CREAT SERPL-MCNC: 0.94 MG/DL (ref 0.6–1)
CRP SERPL-MCNC: 3.5 MG/DL (ref 0–0.9)
DIFFERENTIAL METHOD BLD: NORMAL
EOSINOPHIL # BLD: 0.4 K/UL (ref 0–0.8)
EOSINOPHIL NFR BLD: 6 % (ref 0.5–7.8)
ERYTHROCYTE [DISTWIDTH] IN BLOOD BY AUTOMATED COUNT: 14 % (ref 11.9–14.6)
GLOBULIN SER CALC-MCNC: 4.5 G/DL (ref 2.3–3.5)
GLUCOSE BLD STRIP.AUTO-MCNC: 209 MG/DL (ref 65–100)
GLUCOSE BLD STRIP.AUTO-MCNC: 486 MG/DL (ref 65–100)
GLUCOSE SERPL-MCNC: 483 MG/DL (ref 65–100)
HCT VFR BLD AUTO: 38.9 % (ref 35.8–46.3)
HGB BLD-MCNC: 13.2 G/DL (ref 11.7–15.4)
IMM GRANULOCYTES # BLD AUTO: 0 K/UL (ref 0–0.5)
IMM GRANULOCYTES NFR BLD AUTO: 0 % (ref 0–5)
LYMPHOCYTES # BLD: 1.7 K/UL (ref 0.5–4.6)
LYMPHOCYTES NFR BLD: 28 % (ref 13–44)
MCH RBC QN AUTO: 29.7 PG (ref 26.1–32.9)
MCHC RBC AUTO-ENTMCNC: 33.9 G/DL (ref 31.4–35)
MCV RBC AUTO: 87.4 FL (ref 79.6–97.8)
MONOCYTES # BLD: 0.4 K/UL (ref 0.1–1.3)
MONOCYTES NFR BLD: 7 % (ref 4–12)
NEUTS SEG # BLD: 3.5 K/UL (ref 1.7–8.2)
NEUTS SEG NFR BLD: 58 % (ref 43–78)
NRBC # BLD: 0 K/UL (ref 0–0.2)
PLATELET # BLD AUTO: 258 K/UL (ref 150–450)
PMV BLD AUTO: 9.8 FL (ref 9.4–12.3)
POTASSIUM SERPL-SCNC: 4.6 MMOL/L (ref 3.5–5.1)
PROT SERPL-MCNC: 8 G/DL (ref 6.3–8.2)
RBC # BLD AUTO: 4.45 M/UL (ref 4.05–5.2)
SODIUM SERPL-SCNC: 130 MMOL/L (ref 136–145)
WBC # BLD AUTO: 6.1 K/UL (ref 4.3–11.1)

## 2020-03-12 PROCEDURE — 74011250636 HC RX REV CODE- 250/636: Performed by: EMERGENCY MEDICINE

## 2020-03-12 PROCEDURE — 99284 EMERGENCY DEPT VISIT MOD MDM: CPT

## 2020-03-12 PROCEDURE — 85025 COMPLETE CBC W/AUTO DIFF WBC: CPT

## 2020-03-12 PROCEDURE — 82962 GLUCOSE BLOOD TEST: CPT

## 2020-03-12 PROCEDURE — 96376 TX/PRO/DX INJ SAME DRUG ADON: CPT

## 2020-03-12 PROCEDURE — 96361 HYDRATE IV INFUSION ADD-ON: CPT

## 2020-03-12 PROCEDURE — 80053 COMPREHEN METABOLIC PANEL: CPT

## 2020-03-12 PROCEDURE — 74011000258 HC RX REV CODE- 258: Performed by: EMERGENCY MEDICINE

## 2020-03-12 PROCEDURE — 96365 THER/PROPH/DIAG IV INF INIT: CPT

## 2020-03-12 PROCEDURE — 74011636637 HC RX REV CODE- 636/637: Performed by: EMERGENCY MEDICINE

## 2020-03-12 PROCEDURE — 73630 X-RAY EXAM OF FOOT: CPT

## 2020-03-12 PROCEDURE — 86140 C-REACTIVE PROTEIN: CPT

## 2020-03-12 RX ORDER — AMOXICILLIN AND CLAVULANATE POTASSIUM 875; 125 MG/1; MG/1
1 TABLET, FILM COATED ORAL 2 TIMES DAILY
Qty: 20 TAB | Refills: 0 | Status: SHIPPED | OUTPATIENT
Start: 2020-03-12 | End: 2020-03-22

## 2020-03-12 RX ADMIN — SODIUM CHLORIDE 1.5 G: 900 INJECTION, SOLUTION INTRAVENOUS at 17:35

## 2020-03-12 RX ADMIN — SODIUM CHLORIDE 1000 ML: 900 INJECTION, SOLUTION INTRAVENOUS at 16:21

## 2020-03-12 RX ADMIN — SODIUM CHLORIDE 1000 ML: 900 INJECTION, SOLUTION INTRAVENOUS at 17:34

## 2020-03-12 RX ADMIN — INSULIN HUMAN 10 UNITS: 100 INJECTION, SOLUTION PARENTERAL at 17:35

## 2020-03-12 NOTE — ED NOTES
I have reviewed discharge instructions with the patient. The patient verbalized understanding. Patient left ED via Discharge Method: ambulatory to Home with . Opportunity for questions and clarification provided. Patient given 1 scripts. To continue your aftercare when you leave the hospital, you may receive an automated call from our care team to check in on how you are doing. This is a free service and part of our promise to provide the best care and service to meet your aftercare needs.  If you have questions, or wish to unsubscribe from this service please call [phone]. Thank you for Choosing our [address] [place] [address] Emergency Department.

## 2020-03-12 NOTE — ED PROVIDER NOTES
Patient presents to the ER complaint of right foot pain and ulceration. Patient states she believes her shoe rubbed her foot approximately 3 days ago. Has noticed increased redness and swelling over the past 4 to 5 days. Denies any pain but reports she rarely feels pain in this area secondary to her diabetes. States her blood sugars have been not well controlled. The history is provided by the patient. Foot Ulcer    This is a new problem. The current episode started more than 2 days ago. The problem has not changed since onset. The pain is present in the right toes and right foot. The quality of the pain is described as aching. The pain is at a severity of 4/10. The pain is moderate. Pertinent negatives include no numbness, no stiffness, no itching and no back pain. She has tried nothing for the symptoms.         Past Medical History:   Diagnosis Date    Diabetic peripheral neuropathy associated with type 2 diabetes mellitus     Nonproliferative diabetic retinopathy     Type 2 diabetes mellitus, uncontrolled        Past Surgical History:   Procedure Laterality Date    HX  SECTION   and     HX OTHER SURGICAL      cyst removed from neck         Family History:   Problem Relation Age of Onset    Diabetes Maternal Grandfather     Cancer Maternal Grandfather         lung (smoker)    Breast Cancer Maternal Grandmother     Crohn's Disease Paternal Grandmother     Cancer Paternal Grandfather         lung (smoker)       Social History     Socioeconomic History    Marital status:      Spouse name: Not on file    Number of children: Not on file    Years of education: Not on file    Highest education level: Not on file   Occupational History    Not on file   Social Needs    Financial resource strain: Not on file    Food insecurity     Worry: Not on file     Inability: Not on file    Transportation needs     Medical: Not on file     Non-medical: Not on file   Tobacco Use    Smoking status: Never Smoker    Smokeless tobacco: Never Used   Substance and Sexual Activity    Alcohol use: No     Alcohol/week: 0.0 standard drinks    Drug use: No    Sexual activity: Not on file   Lifestyle    Physical activity     Days per week: Not on file     Minutes per session: Not on file    Stress: Not on file   Relationships    Social connections     Talks on phone: Not on file     Gets together: Not on file     Attends Taoist service: Not on file     Active member of club or organization: Not on file     Attends meetings of clubs or organizations: Not on file     Relationship status: Not on file    Intimate partner violence     Fear of current or ex partner: Not on file     Emotionally abused: Not on file     Physically abused: Not on file     Forced sexual activity: Not on file   Other Topics Concern    Not on file   Social History Narrative    Not on file         ALLERGIES: Patient has no known allergies. Review of Systems   Constitutional: Negative for fatigue and fever. HENT: Negative for congestion. Eyes: Negative for photophobia and redness. Respiratory: Negative for cough, chest tightness and stridor. Cardiovascular: Negative for palpitations and leg swelling. Gastrointestinal: Negative for abdominal pain. Endocrine: Negative for polydipsia, polyphagia and polyuria. Genitourinary: Negative for urgency. Musculoskeletal: Negative for back pain and stiffness. Skin: Positive for color change. Negative for itching. Neurological: Negative for syncope and numbness. Hematological: Negative for adenopathy. Psychiatric/Behavioral: Negative for behavioral problems and confusion. All other systems reviewed and are negative. Vitals:    03/12/20 1521   BP: 113/77   Pulse: 95   Resp: 16   Temp: 98 °F (36.7 °C)   SpO2: 100%   Weight: 97.1 kg (214 lb)            Physical Exam  Vitals signs and nursing note reviewed.    Constitutional:       Appearance: Normal appearance. HENT:      Head: Normocephalic and atraumatic. Nose: Nose normal. No congestion or rhinorrhea. Neck:      Musculoskeletal: Normal range of motion and neck supple. Cardiovascular:      Rate and Rhythm: Normal rate and regular rhythm. Pulses: Normal pulses. Heart sounds: Normal heart sounds. Pulmonary:      Effort: Pulmonary effort is normal.      Breath sounds: Normal breath sounds. Abdominal:      General: Abdomen is flat. Palpations: Abdomen is soft. Musculoskeletal:         General: No swelling or tenderness. Feet:    Skin:     General: Skin is warm. Capillary Refill: Capillary refill takes less than 2 seconds. Coloration: Skin is not jaundiced. Neurological:      General: No focal deficit present. Mental Status: She is alert. MDM  Number of Diagnoses or Management Options  Diagnosis management comments: Concern for cellulitis, will obtain x-ray as well as basic labs to rule out any gross signs of osteomyelitis. Will check labs as well to rule out DKA    6:59 PM  Normal white count. ERP mildly elevated at 3.5. Initial blood sugar was 46 without any signs of DKA. X-ray shows no gross signs osteomyelitis, blood sugar improved with IV fluids and insulin. Plan to discharge home with antibiotics. She was given Unasyn here.     Urged close follow-up with PCP as well as monitor blood sugars at home       Amount and/or Complexity of Data Reviewed  Clinical lab tests: ordered and reviewed  Tests in the radiology section of CPT®: ordered and reviewed    Risk of Complications, Morbidity, and/or Mortality  Presenting problems: moderate  Diagnostic procedures: moderate  Management options: moderate    Patient Progress  Patient progress: stable         Procedures      Results Include:    Recent Results (from the past 24 hour(s))   GLUCOSE, POC    Collection Time: 03/12/20  3:23 PM   Result Value Ref Range    Glucose (POC) 486 (HH) 65 - 100 mg/dL CBC WITH AUTOMATED DIFF    Collection Time: 03/12/20  4:03 PM   Result Value Ref Range    WBC 6.1 4.3 - 11.1 K/uL    RBC 4.45 4.05 - 5.2 M/uL    HGB 13.2 11.7 - 15.4 g/dL    HCT 38.9 35.8 - 46.3 %    MCV 87.4 79.6 - 97.8 FL    MCH 29.7 26.1 - 32.9 PG    MCHC 33.9 31.4 - 35.0 g/dL    RDW 14.0 11.9 - 14.6 %    PLATELET 743 047 - 842 K/uL    MPV 9.8 9.4 - 12.3 FL    ABSOLUTE NRBC 0.00 0.0 - 0.2 K/uL    DF AUTOMATED      NEUTROPHILS 58 43 - 78 %    LYMPHOCYTES 28 13 - 44 %    MONOCYTES 7 4.0 - 12.0 %    EOSINOPHILS 6 0.5 - 7.8 %    BASOPHILS 1 0.0 - 2.0 %    IMMATURE GRANULOCYTES 0 0.0 - 5.0 %    ABS. NEUTROPHILS 3.5 1.7 - 8.2 K/UL    ABS. LYMPHOCYTES 1.7 0.5 - 4.6 K/UL    ABS. MONOCYTES 0.4 0.1 - 1.3 K/UL    ABS. EOSINOPHILS 0.4 0.0 - 0.8 K/UL    ABS. BASOPHILS 0.1 0.0 - 0.2 K/UL    ABS. IMM. GRANS. 0.0 0.0 - 0.5 K/UL   METABOLIC PANEL, COMPREHENSIVE    Collection Time: 03/12/20  4:03 PM   Result Value Ref Range    Sodium 130 (L) 136 - 145 mmol/L    Potassium 4.6 3.5 - 5.1 mmol/L    Chloride 97 (L) 98 - 107 mmol/L    CO2 26 21 - 32 mmol/L    Anion gap 7 7 - 16 mmol/L    Glucose 483 (HH) 65 - 100 mg/dL    BUN 8 6 - 23 MG/DL    Creatinine 0.94 0.6 - 1.0 MG/DL    GFR est AA >60 >60 ml/min/1.73m2    GFR est non-AA >60 >60 ml/min/1.73m2    Calcium 8.7 8.3 - 10.4 MG/DL    Bilirubin, total 0.4 0.2 - 1.1 MG/DL    ALT (SGPT) 17 12 - 65 U/L    AST (SGOT) 19 15 - 37 U/L    Alk. phosphatase 86 50 - 136 U/L    Protein, total 8.0 6.3 - 8.2 g/dL    Albumin 3.5 3.5 - 5.0 g/dL    Globulin 4.5 (H) 2.3 - 3.5 g/dL    A-G Ratio 0.8 (L) 1.2 - 3.5     C REACTIVE PROTEIN, QT    Collection Time: 03/12/20  4:03 PM   Result Value Ref Range    C-Reactive protein 3.5 (H) 0.0 - 0.9 mg/dL   GLUCOSE, POC    Collection Time: 03/12/20  6:27 PM   Result Value Ref Range    Glucose (POC) 209 (H) 65 - 100 mg/dL     Voice dictation software was used during the making of this note.   This software is not perfect and grammatical and other typographical errors may be present. This note has been proofread, but may still contain errors.   Ted Chacon MD; 3/12/2020 @7:00 PM   ===================================================================

## 2020-03-12 NOTE — DISCHARGE INSTRUCTIONS
Take medications as prescribed  Monitor your blood sugars at home  Follow-up with your primary care physician  Return to the ER for any new or worsening symptoms    Cellulitis: Care Instructions  Your Care Instructions    Cellulitis is a skin infection caused by bacteria, most often strep or staph. It often occurs after a break in the skin from a scrape, cut, bite, or puncture, or after a rash. Cellulitis may be treated without doing tests to find out what caused it. But your doctor may do tests, if needed, to look for a specific bacteria, like methicillin-resistant Staphylococcus aureus (MRSA). The doctor has checked you carefully, but problems can develop later. If you notice any problems or new symptoms, get medical treatment right away. Follow-up care is a key part of your treatment and safety. Be sure to make and go to all appointments, and call your doctor if you are having problems. It's also a good idea to know your test results and keep a list of the medicines you take. How can you care for yourself at home? · Take your antibiotics as directed. Do not stop taking them just because you feel better. You need to take the full course of antibiotics. · Prop up the infected area on pillows to reduce pain and swelling. Try to keep the area above the level of your heart as often as you can. · If your doctor told you how to care for your wound, follow your doctor's instructions. If you did not get instructions, follow this general advice:  ? Wash the wound with clean water 2 times a day. Don't use hydrogen peroxide or alcohol, which can slow healing. ? You may cover the wound with a thin layer of petroleum jelly, such as Vaseline, and a nonstick bandage. ? Apply more petroleum jelly and replace the bandage as needed. · Be safe with medicines. Take pain medicines exactly as directed. ? If the doctor gave you a prescription medicine for pain, take it as prescribed.   ? If you are not taking a prescription pain medicine, ask your doctor if you can take an over-the-counter medicine. To prevent cellulitis in the future  · Try to prevent cuts, scrapes, or other injuries to your skin. Cellulitis most often occurs where there is a break in the skin. · If you get a scrape, cut, mild burn, or bite, wash the wound with clean water as soon as you can to help avoid infection. Don't use hydrogen peroxide or alcohol, which can slow healing. · If you have swelling in your legs (edema), support stockings and good skin care may help prevent leg sores and cellulitis. · Take care of your feet, especially if you have diabetes or other conditions that increase the risk of infection. Wear shoes and socks. Do not go barefoot. If you have athlete's foot or other skin problems on your feet, talk to your doctor about how to treat them. When should you call for help? Call your doctor now or seek immediate medical care if:    · You have signs that your infection is getting worse, such as:  ? Increased pain, swelling, warmth, or redness. ? Red streaks leading from the area. ? Pus draining from the area. ? A fever.     · You get a rash.    Watch closely for changes in your health, and be sure to contact your doctor if:    · You do not get better as expected. Where can you learn more? Go to http://becca-padmini.info/  Enter X309 in the search box to learn more about \"Cellulitis: Care Instructions. \"  Current as of: October 30, 2019Content Version: 12.4  © 8138-5185 Healthwise, Incorporated. Care instructions adapted under license by [employer] (which disclaims liability or warranty for this information). If you have questions about a medical condition or this instruction, always ask your healthcare professional. [place] [address] any warranty or liability for your use of this information.          Patient Education        Learning About High Blood Sugar  What is high blood sugar?    Your body turns the food you eat into glucose (sugar), which it uses for energy. But if your body isn't able to use the sugar right away, it can build up in your blood and lead to high blood sugar. When the amount of sugar in your blood stays too high for too much of the time, you may have diabetes. Diabetes is a disease that can cause serious health problems. The good news is that lifestyle changes may help you get your blood sugar back to normal and avoid or delay diabetes. What causes high blood sugar? Sugar (glucose) can build up in your blood if you:  · Are overweight. · Have a family history of diabetes. · Take certain medicines, such as steroids. What are the symptoms? Having high blood sugar may not cause any symptoms at all. Or it may make you feel very thirsty or very hungry. You may also urinate more often than usual, have blurry vision, or lose weight without trying. How is high blood sugar treated? You can take steps to lower your blood sugar level if you understand what makes it get higher. Your doctor may want you to learn how to test your blood sugar level at home. Then you can see how illness, stress, or different kinds of food or medicine raise or lower your blood sugar level. Other tests may be needed to see if you have diabetes. How can you prevent high blood sugar? · Watch your weight. If you're overweight, losing just a small amount of weight may help. Reducing fat around your waist is most important. · Limit the amount of calories, sweets, and unhealthy fat you eat. Ask your doctor if a dietitian can help you. A registered dietitian can help you create meal plans that fit your lifestyle. · Get at least 30 minutes of exercise on most days of the week. Exercise helps control your blood sugar. It also helps you maintain a healthy weight. Walking is a good choice.  You also may want to do other activities, such as running, swimming, cycling, or playing tennis or team sports. · If your doctor prescribed medicines, take them exactly as prescribed. Call your doctor if you think you are having a problem with your medicine. You will get more details on the specific medicines your doctor prescribes. Follow-up care is a key part of your treatment and safety. Be sure to make and go to all appointments, and call your doctor if you are having problems. It's also a good idea to know your test results and keep a list of the medicines you take. Where can you learn more? Go to http://becca-padmini.info/  Enter O108 in the search box to learn more about \"Learning About High Blood Sugar. \"  Current as of: December 19, 2019Content Version: 12.4  © 9219-4955 Healthwise, Incorporated. Care instructions adapted under license by [employer] (which disclaims liability or warranty for this information). If you have questions about a medical condition or this instruction, always ask your healthcare professional. [address] any warranty or liability for your use of this information.

## 2020-03-12 NOTE — ED NOTES
Verbal report received from Emely Mae [facility]. Care transferred to this RN at this time.

## 2020-03-12 NOTE — LETTER
[address] [address] EMERGENCY DEPT 
[address] Sherryle Blazing [facility] [address] [phone] Work/School Note Date: 3/12/2020 To Whom It May concern: 
 
Arcelia Jefferson was seen and treated today in the emergency room by the following provider(s): 
Attending Provider: Dwight Eller MD.   
 
Arcelia Jefferson may return to work on 03/13/2020. Sincerely, Tahmina Dickson RN

## 2020-03-29 ENCOUNTER — APPOINTMENT (OUTPATIENT)
Dept: GENERAL RADIOLOGY | Age: 45
DRG: 617 | End: 2020-03-29
Attending: STUDENT IN AN ORGANIZED HEALTH CARE EDUCATION/TRAINING PROGRAM
Payer: COMMERCIAL

## 2020-03-29 ENCOUNTER — HOSPITAL ENCOUNTER (INPATIENT)
Age: 45
LOS: 3 days | Discharge: HOME | DRG: 617 | End: 2020-04-01
Attending: STUDENT IN AN ORGANIZED HEALTH CARE EDUCATION/TRAINING PROGRAM | Admitting: INTERNAL MEDICINE
Payer: COMMERCIAL

## 2020-03-29 DIAGNOSIS — L08.9 RIGHT FOOT INFECTION: Primary | ICD-10-CM

## 2020-03-29 DIAGNOSIS — A41.9 SEPSIS, DUE TO UNSPECIFIED ORGANISM, UNSPECIFIED WHETHER ACUTE ORGAN DYSFUNCTION PRESENT (HCC): ICD-10-CM

## 2020-03-29 PROBLEM — E11.628 DIABETIC FOOT INFECTION (HCC): Status: ACTIVE | Noted: 2020-03-29

## 2020-03-29 LAB
ALBUMIN SERPL-MCNC: 2.9 G/DL (ref 3.5–5)
ALBUMIN/GLOB SERPL: 0.6 {RATIO} (ref 1.2–3.5)
ALP SERPL-CCNC: 102 U/L (ref 50–130)
ALT SERPL-CCNC: 16 U/L (ref 12–65)
ANION GAP SERPL CALC-SCNC: 7 MMOL/L (ref 7–16)
AST SERPL-CCNC: 38 U/L (ref 15–37)
BASOPHILS # BLD: 0.1 K/UL (ref 0–0.2)
BASOPHILS NFR BLD: 1 % (ref 0–2)
BILIRUB SERPL-MCNC: 0.5 MG/DL (ref 0.2–1.1)
BUN SERPL-MCNC: 7 MG/DL (ref 6–23)
CALCIUM SERPL-MCNC: 8.8 MG/DL (ref 8.3–10.4)
CHLORIDE SERPL-SCNC: 96 MMOL/L (ref 98–107)
CO2 SERPL-SCNC: 26 MMOL/L (ref 21–32)
CREAT SERPL-MCNC: 0.89 MG/DL (ref 0.6–1)
CRP SERPL-MCNC: 14.6 MG/DL (ref 0–0.9)
DIFFERENTIAL METHOD BLD: ABNORMAL
EOSINOPHIL # BLD: 0.5 K/UL (ref 0–0.8)
EOSINOPHIL NFR BLD: 7 % (ref 0.5–7.8)
ERYTHROCYTE [DISTWIDTH] IN BLOOD BY AUTOMATED COUNT: 13.7 % (ref 11.9–14.6)
GLOBULIN SER CALC-MCNC: 5.1 G/DL (ref 2.3–3.5)
GLUCOSE BLD STRIP.AUTO-MCNC: 151 MG/DL (ref 65–100)
GLUCOSE SERPL-MCNC: 390 MG/DL (ref 65–100)
HCT VFR BLD AUTO: 39.6 % (ref 35.8–46.3)
HGB BLD-MCNC: 13.5 G/DL (ref 11.7–15.4)
IMM GRANULOCYTES # BLD AUTO: 0 K/UL (ref 0–0.5)
IMM GRANULOCYTES NFR BLD AUTO: 1 % (ref 0–5)
LACTATE SERPL-SCNC: 2.6 MMOL/L (ref 0.4–2)
LACTATE SERPL-SCNC: 3.6 MMOL/L (ref 0.4–2)
LYMPHOCYTES # BLD: 1.4 K/UL (ref 0.5–4.6)
LYMPHOCYTES NFR BLD: 18 % (ref 13–44)
MCH RBC QN AUTO: 29.8 PG (ref 26.1–32.9)
MCHC RBC AUTO-ENTMCNC: 34.1 G/DL (ref 31.4–35)
MCV RBC AUTO: 87.4 FL (ref 79.6–97.8)
MONOCYTES # BLD: 0.6 K/UL (ref 0.1–1.3)
MONOCYTES NFR BLD: 8 % (ref 4–12)
NEUTS SEG # BLD: 5.2 K/UL (ref 1.7–8.2)
NEUTS SEG NFR BLD: 66 % (ref 43–78)
NRBC # BLD: 0.03 K/UL (ref 0–0.2)
PLATELET # BLD AUTO: 267 K/UL (ref 150–450)
PMV BLD AUTO: 9.2 FL (ref 9.4–12.3)
POTASSIUM SERPL-SCNC: 4.6 MMOL/L (ref 3.5–5.1)
PROT SERPL-MCNC: 8 G/DL (ref 6.3–8.2)
RBC # BLD AUTO: 4.53 M/UL (ref 4.05–5.2)
SODIUM SERPL-SCNC: 129 MMOL/L (ref 136–145)
WBC # BLD AUTO: 7.9 K/UL (ref 4.3–11.1)

## 2020-03-29 PROCEDURE — 65270000029 HC RM PRIVATE

## 2020-03-29 PROCEDURE — 99283 EMERGENCY DEPT VISIT LOW MDM: CPT

## 2020-03-29 PROCEDURE — 74011250636 HC RX REV CODE- 250/636: Performed by: INTERNAL MEDICINE

## 2020-03-29 PROCEDURE — 73630 X-RAY EXAM OF FOOT: CPT

## 2020-03-29 PROCEDURE — 74011250637 HC RX REV CODE- 250/637: Performed by: INTERNAL MEDICINE

## 2020-03-29 PROCEDURE — 74011636637 HC RX REV CODE- 636/637: Performed by: INTERNAL MEDICINE

## 2020-03-29 PROCEDURE — 80053 COMPREHEN METABOLIC PANEL: CPT

## 2020-03-29 PROCEDURE — 74011000258 HC RX REV CODE- 258: Performed by: STUDENT IN AN ORGANIZED HEALTH CARE EDUCATION/TRAINING PROGRAM

## 2020-03-29 PROCEDURE — 87040 BLOOD CULTURE FOR BACTERIA: CPT

## 2020-03-29 PROCEDURE — 82962 GLUCOSE BLOOD TEST: CPT

## 2020-03-29 PROCEDURE — 83605 ASSAY OF LACTIC ACID: CPT

## 2020-03-29 PROCEDURE — 96367 TX/PROPH/DG ADDL SEQ IV INF: CPT

## 2020-03-29 PROCEDURE — 74011250636 HC RX REV CODE- 250/636: Performed by: STUDENT IN AN ORGANIZED HEALTH CARE EDUCATION/TRAINING PROGRAM

## 2020-03-29 PROCEDURE — 85025 COMPLETE CBC W/AUTO DIFF WBC: CPT

## 2020-03-29 PROCEDURE — 86140 C-REACTIVE PROTEIN: CPT

## 2020-03-29 PROCEDURE — 96365 THER/PROPH/DIAG IV INF INIT: CPT

## 2020-03-29 RX ORDER — METFORMIN HYDROCHLORIDE 850 MG/1
850 TABLET ORAL
Status: DISCONTINUED | OUTPATIENT
Start: 2020-03-30 | End: 2020-03-30

## 2020-03-29 RX ORDER — ATORVASTATIN CALCIUM 10 MG/1
20 TABLET, FILM COATED ORAL
Status: DISCONTINUED | OUTPATIENT
Start: 2020-03-29 | End: 2020-04-01 | Stop reason: HOSPADM

## 2020-03-29 RX ORDER — HEPARIN SODIUM 5000 [USP'U]/ML
5000 INJECTION, SOLUTION INTRAVENOUS; SUBCUTANEOUS EVERY 12 HOURS
Status: DISCONTINUED | OUTPATIENT
Start: 2020-03-29 | End: 2020-04-01 | Stop reason: HOSPADM

## 2020-03-29 RX ORDER — INSULIN LISPRO 100 [IU]/ML
INJECTION, SOLUTION INTRAVENOUS; SUBCUTANEOUS
Status: DISCONTINUED | OUTPATIENT
Start: 2020-03-29 | End: 2020-04-01 | Stop reason: HOSPADM

## 2020-03-29 RX ORDER — VANCOMYCIN 1.75 GRAM/500 ML IN 0.9 % SODIUM CHLORIDE INTRAVENOUS
1750 ONCE
Status: COMPLETED | OUTPATIENT
Start: 2020-03-29 | End: 2020-03-29

## 2020-03-29 RX ORDER — SODIUM CHLORIDE 9 MG/ML
50 INJECTION, SOLUTION INTRAVENOUS CONTINUOUS
Status: DISCONTINUED | OUTPATIENT
Start: 2020-03-29 | End: 2020-04-01 | Stop reason: HOSPADM

## 2020-03-29 RX ORDER — SODIUM CHLORIDE 0.9 % (FLUSH) 0.9 %
5-40 SYRINGE (ML) INJECTION AS NEEDED
Status: DISCONTINUED | OUTPATIENT
Start: 2020-03-29 | End: 2020-04-01 | Stop reason: HOSPADM

## 2020-03-29 RX ORDER — ACETAMINOPHEN 325 MG/1
650 TABLET ORAL
Status: DISCONTINUED | OUTPATIENT
Start: 2020-03-29 | End: 2020-04-01 | Stop reason: HOSPADM

## 2020-03-29 RX ORDER — GABAPENTIN 300 MG/1
300 CAPSULE ORAL 2 TIMES DAILY
Status: DISCONTINUED | OUTPATIENT
Start: 2020-03-29 | End: 2020-04-01 | Stop reason: HOSPADM

## 2020-03-29 RX ORDER — ERGOCALCIFEROL 1.25 MG/1
50000 CAPSULE ORAL
Status: DISCONTINUED | OUTPATIENT
Start: 2020-04-01 | End: 2020-03-29

## 2020-03-29 RX ORDER — SODIUM CHLORIDE 0.9 % (FLUSH) 0.9 %
5-40 SYRINGE (ML) INJECTION EVERY 8 HOURS
Status: DISCONTINUED | OUTPATIENT
Start: 2020-03-29 | End: 2020-04-01 | Stop reason: HOSPADM

## 2020-03-29 RX ORDER — INSULIN GLARGINE 100 [IU]/ML
80 INJECTION, SOLUTION SUBCUTANEOUS DAILY
Status: DISCONTINUED | OUTPATIENT
Start: 2020-03-30 | End: 2020-03-30

## 2020-03-29 RX ORDER — ONDANSETRON 2 MG/ML
4 INJECTION INTRAMUSCULAR; INTRAVENOUS
Status: DISCONTINUED | OUTPATIENT
Start: 2020-03-29 | End: 2020-04-01 | Stop reason: HOSPADM

## 2020-03-29 RX ORDER — VANCOMYCIN/0.9 % SOD CHLORIDE 1.5G/250ML
1500 PLASTIC BAG, INJECTION (ML) INTRAVENOUS EVERY 12 HOURS
Status: DISCONTINUED | OUTPATIENT
Start: 2020-03-30 | End: 2020-03-31

## 2020-03-29 RX ORDER — ESCITALOPRAM OXALATE 10 MG/1
10 TABLET ORAL DAILY
Status: DISCONTINUED | OUTPATIENT
Start: 2020-03-30 | End: 2020-04-01 | Stop reason: HOSPADM

## 2020-03-29 RX ADMIN — SODIUM CHLORIDE 1000 ML: 900 INJECTION, SOLUTION INTRAVENOUS at 16:42

## 2020-03-29 RX ADMIN — VANCOMYCIN HYDROCHLORIDE 1750 MG: 10 INJECTION, POWDER, LYOPHILIZED, FOR SOLUTION INTRAVENOUS at 17:25

## 2020-03-29 RX ADMIN — INSULIN LISPRO 2 UNITS: 100 INJECTION, SOLUTION INTRAVENOUS; SUBCUTANEOUS at 21:29

## 2020-03-29 RX ADMIN — Medication 5 ML: at 19:38

## 2020-03-29 RX ADMIN — CEFTRIAXONE SODIUM 2 G: 2 INJECTION, POWDER, FOR SOLUTION INTRAMUSCULAR; INTRAVENOUS at 16:55

## 2020-03-29 RX ADMIN — HEPARIN SODIUM 5000 UNITS: 5000 INJECTION INTRAVENOUS; SUBCUTANEOUS at 21:28

## 2020-03-29 RX ADMIN — SODIUM CHLORIDE 100 ML/HR: 900 INJECTION, SOLUTION INTRAVENOUS at 19:38

## 2020-03-29 RX ADMIN — SODIUM CHLORIDE 1000 ML: 900 INJECTION, SOLUTION INTRAVENOUS at 18:05

## 2020-03-29 RX ADMIN — GABAPENTIN 300 MG: 300 CAPSULE ORAL at 21:30

## 2020-03-29 RX ADMIN — ATORVASTATIN CALCIUM 20 MG: 10 TABLET, FILM COATED ORAL at 23:27

## 2020-03-29 NOTE — PROGRESS NOTES
TRANSFER - IN REPORT:    Verbal report received from Nicole Pascual RN on Sabine Keita  being received from [address] for routine progression of care      Report consisted of patients Situation, Background, Assessment and   Recommendations(SBAR). Information from the following report(s) SBAR, ED Summary and MAR was reviewed with the receiving nurse. Opportunity for questions and clarification was provided. Assessment to be completed upon patients arrival to unit and care to be assumed.

## 2020-03-29 NOTE — ED TRIAGE NOTES
Pt states she was treated two weeks ago for infection to right fifth digit and now the toe is black and redness is getting worse. Pt is a diabetic.

## 2020-03-29 NOTE — H&P
Hospitalist H&P Note     Admit Date:  3/29/2020  4:21 PM   Name:  Yulissa Boykin   Age:  38 y.o.  :  1975   MRN:  [id number]   PCP:  Trinity Tom MD  Treatment Team: Attending Provider: Gabby Antoine; Primary Nurse: Sukhjinder Dorman RN    HPI:     The patient is a 46-year-old  lady with DM type II, diabetic neuropathy, nonproliferative diabetic retinopathy, presented to the ED at [facility] complaining of worsening redness and discoloration of the right fifth toe. The patient was actually seen in the ED 2 weeks ago for the foot infection and was discharged home on Augmentin. She said that there has been some improvement initially, but 2 days ago she noticed some worsening redness affecting the lateral aspect of the right foot and also the right fifth toe. Of note, she had a callus in the area that she probably rubbed against her shoes. Since then, the right foot and right fifth toe have been red, warm and swollen. The skin over the right fifth toe is denuded and is exhibiting some darkish discoloration without being black all over. Labs noted normal WBC count of 7.9, hemoglobin 13.5, sodium 129, potassium 4.6, glucose 390, creatinine 0.89, lactic acid 3.6. Right foot x-ray did not show any acute osseous or joint abnormalities. The patient was afebrile with a temperature of 97.8 F and had mild tachycardia with heart rate of 102 on presentation. She was started on IV antibiotics, ceftriaxone and vancomycin. Based on her clinical presentation, the hospitalist service was contacted and the patient was admitted to the medical floor for diabetic foot infection, specifically cellulitis of the right foot, cellulitis of the right 5th toe with possible early gangrene of the right fifth toe. ROS: All systems reviewed and negative except as noted in HPI.     Past Medical History:   Diagnosis Date    Diabetic peripheral neuropathy associated with type 2 diabetes mellitus  Nonproliferative diabetic retinopathy     Type 2 diabetes mellitus, uncontrolled       Past Surgical History:   Procedure Laterality Date    HX  SECTION   and     HX OTHER SURGICAL  1999    cyst removed from neck      No Known Allergies   Social History     Tobacco Use    Smoking status: Never Smoker    Smokeless tobacco: Never Used   Substance Use Topics    Alcohol use: No     Alcohol/week: 0.0 standard drinks      Family History   Problem Relation Age of Onset    Diabetes Maternal Grandfather     Cancer Maternal Grandfather         lung (smoker)    Breast Cancer Maternal Grandmother     Crohn's Disease Paternal Grandmother     Cancer Paternal Grandfather         lung (smoker)      Immunization History   Administered Date(s) Administered    Pneumococcal Polysaccharide (PPSV-23) 2016     PTA Medications:  Prior to Admission Medications   Prescriptions Last Dose Informant Patient Reported? Taking? FREESTYLE MILIND 14 DAY READER misc   No No   Sig: Use to check blood glucose 4 times daily E11.65   FREESTYLE MILIND 14 DAY SENSOR kit   No No   Sig: Use to check blood glucose 4 times daily E11.65   HUMALOG KWIKPEN INSULIN 100 unit/mL kwikpen   No No   Sig: Take 20 units before each meal plus correction 3/50 >150, max daily dose 100 units   JARDIANCE 25 mg tablet   No No   Sig: Take 1 Tab by mouth daily. LOTEMAX 0.5 % ophthalmic suspension   Yes No   Sig: INSTILL 1 TO 2 DROPS IN INTO LEFT EYE EVERY 6 HOURS   FRANSISCO PEN NEEDLE 32 gauge x 5/32\" ndle   No No   Si injections per day. Dx Z99.06   ONE TOUCH DELICA 33 gauge misc   No No   Sig: Check blood glucose three times daily. Dx E11.65   ONETOUCH VERIO HIGH CONTROL soln   No No   Sig: Use with onetouch verio meter,  E11.65   ONETOUCH VERIO MID CONTROL soln   No No   Sig: Use with onetouch verio meter,  E11.65   ONETOUCH VERIO strip   No No   Sig: Check blood glucose three times daily.   Dx E11.65   TRESIBA FLEXTOUCH U-200 200 unit/mL (3 mL) inpn   No No   Si Units by SubCUTAneous route daily. aspirin/acetaminophen/caffeine (EXCEDRIN MIGRAINE PO)   Yes No   Sig: Take 1 Tab by mouth as needed. atorvastatin (LIPITOR) 20 mg tablet   No No   Sig: Take 1 Tab by mouth daily. dexAMETHasone (DECADRON) 1 mg tablet   No No   Sig: Take one tablet at 11pm the night prior to your fasting 8 am blood draw   ergocalciferol (ERGOCALCIFEROL) 50,000 unit capsule   No No   Sig: Take 1 Cap by mouth two (2) times a week on Wednesday and Saturday. escitalopram oxalate (LEXAPRO) 10 mg tablet   Yes No   Sig: Take 10 mg by mouth daily. gabapentin (NEURONTIN) 300 mg capsule   No No   Sig: Take 1 Cap by mouth two (2) times a day. Patient taking differently: Take 300 mg by mouth as needed. metFORMIN ER (GLUCOPHAGE XR) 750 mg tablet   No No   Sig: Take 1 Tab by mouth daily. ondansetron hcl (ZOFRAN) 8 mg tablet   No No   Sig: Take 1 Tab by mouth every eight (8) hours as needed for Nausea. Facility-Administered Medications: None       Objective:     Patient Vitals for the past 24 hrs:   Temp Pulse Resp BP SpO2   20  (!) 101  131/86 97 %   20 1757    135/66    20 1619 97.8 °F (36.6 °C) (!) 102 16 108/68 98 %     Oxygen Therapy  O2 Sat (%): 97 % (20)  Pulse via Oximetry: 101 beats per minute (20)  O2 Device: Room air (20 1639)    Intake/Output Summary (Last 24 hours) at 3/29/2020 1857  Last data filed at 3/29/2020 1725  Gross per 24 hour   Intake 1050 ml   Output    Net 1050 ml       Physical Exam:  General:    Well nourished. Alert. Eyes:   Normal sclera. Extraocular movements intact. ENT:  Normocephalic, atraumatic. Moist mucous membranes  CV:   RRR. No murmur, rub, or gallop. Lungs:  CTAB. No wheezing, rhonchi, or rales. Abdomen: Soft, nontender, nondistended. Bowel sounds normal.   Extremities: Warm and dry. No cyanosis.  Erythema, swelling and warmth of the right foot and right 5th toe, with blackish discoloration of the right 5th toe. Part of the skin over the right 5th toe is denuded. Neurologic: CN II-XII grossly intact. Diminished sensation in the LE bilaterally. Skin:     No rashes, except for erythema of the right foot and darkish discoloration of the right 5th toe. Psych:  Normal mood and affect. I reviewed the labs, imaging, EKGs, telemetry, and other studies done this admission. Data Review:   Recent Results (from the past 24 hour(s))   CBC WITH AUTOMATED DIFF    Collection Time: 03/29/20  4:43 PM   Result Value Ref Range    WBC 7.9 4.3 - 11.1 K/uL    RBC 4.53 4.05 - 5.2 M/uL    HGB 13.5 11.7 - 15.4 g/dL    HCT 39.6 35.8 - 46.3 %    MCV 87.4 79.6 - 97.8 FL    MCH 29.8 26.1 - 32.9 PG    MCHC 34.1 31.4 - 35.0 g/dL    RDW 13.7 11.9 - 14.6 %    PLATELET 074 124 - 637 K/uL    MPV 9.2 (L) 9.4 - 12.3 FL    ABSOLUTE NRBC 0.03 0.0 - 0.2 K/uL    DF AUTOMATED      NEUTROPHILS 66 43 - 78 %    LYMPHOCYTES 18 13 - 44 %    MONOCYTES 8 4.0 - 12.0 %    EOSINOPHILS 7 0.5 - 7.8 %    BASOPHILS 1 0.0 - 2.0 %    IMMATURE GRANULOCYTES 1 0.0 - 5.0 %    ABS. NEUTROPHILS 5.2 1.7 - 8.2 K/UL    ABS. LYMPHOCYTES 1.4 0.5 - 4.6 K/UL    ABS. MONOCYTES 0.6 0.1 - 1.3 K/UL    ABS. EOSINOPHILS 0.5 0.0 - 0.8 K/UL    ABS. BASOPHILS 0.1 0.0 - 0.2 K/UL    ABS. IMM. GRANS. 0.0 0.0 - 0.5 K/UL   METABOLIC PANEL, COMPREHENSIVE    Collection Time: 03/29/20  4:43 PM   Result Value Ref Range    Sodium 129 (L) 136 - 145 mmol/L    Potassium 4.6 3.5 - 5.1 mmol/L    Chloride 96 (L) 98 - 107 mmol/L    CO2 26 21 - 32 mmol/L    Anion gap 7 7 - 16 mmol/L    Glucose 390 (H) 65 - 100 mg/dL    BUN 7 6 - 23 MG/DL    Creatinine 0.89 0.6 - 1.0 MG/DL    GFR est AA >60 >60 ml/min/1.73m2    GFR est non-AA >60 >60 ml/min/1.73m2    Calcium 8.8 8.3 - 10.4 MG/DL    Bilirubin, total 0.5 0.2 - 1.1 MG/DL    ALT (SGPT) 16 12 - 65 U/L    AST (SGOT) 38 (H) 15 - 37 U/L    Alk.  phosphatase 102 50 - 130 U/L    Protein, total 8.0 6.3 - 8.2 g/dL    Albumin 2.9 (L) 3.5 - 5.0 g/dL    Globulin 5.1 (H) 2.3 - 3.5 g/dL    A-G Ratio 0.6 (L) 1.2 - 3.5     LACTIC ACID    Collection Time: 03/29/20  4:43 PM   Result Value Ref Range    Lactic acid 3.6 (HH) 0.4 - 2.0 MMOL/L       All Micro Results     Procedure Component Value Units Date/Time    CULTURE, BLOOD [[id number]] Collected:  03/29/20 1757    Order Status:  Completed Specimen:  Blood Updated:  03/29/20 1805    CULTURE, BLOOD [[id number]] Collected:  03/29/20 1800    Order Status:  Completed Specimen:  Blood Updated:  03/29/20 1805          Other Studies:  Xr Foot Rt Min 3 V    Result Date: 3/29/2020  Clinical History: The patient is a 38years year old Female presenting with symptoms of infection of the little toe, ?osteo. Comparison:  Right foot films 3/12/2020 Findings: 3 views of the right foot were obtained. No fracture or dislocation is identified. There is no evidence of osseous erosion to suggest osteomyelitis. Joint spaces are well-maintained. Impression: No acute osseous or joint abnormalities. If clinical concern persists, further evaluation with either 3 phase bone scan or MRI is recommended to exclude osteomyelitis       Assessment and Plan:     Hospital Problems as of 3/29/2020 Date Reviewed: 11/5/2019          Codes Class Noted - Resolved POA    [facility] ICD-10-CM: E11.628, L08.9  ICD-9-CM: 250.80, 686.9  3/29/2020 - Present Unknown            1 - Diabetic Foot Infection  2 - Cellulitis of the right foot, cellulitis of the right 5th toe with possible early gangrene of the right fifth toe  3 - Uncontrolled DM type II   4 - Diabetic neuropathy   5 - Nonproliferative diabetic retinopathy    PLAN:  · Start IV antibiotics, ceftriaxone and vancomycin  · Consult Infectious Disease in the morning for optimal antibiotic management  · Consult General Surgery in the morning to evaluate the need of possible right fifth toe amputation.   However, it might be premature at this time and I would give a shot to the IV antibiotics before deciding on an amputation. In any case, I will defer to the expertise of the general surgeon for that decision. · Repeat lactic acid and check CRP  · Hydration with IV fluids, NS at 100 cc/hr  · Resume home diabetic regimen. Sliding scale insulin. · Resume home medications for chronic conditions. · The plan of care was discussed with the patient at the bedside and she is agreeable. DVT ppx: With heparin SQ  Code status:  Full code  Estimated LOS:  Greater than 2 midnights  Risk:  high    Signed:   Hyun Tripathi MD

## 2020-03-29 NOTE — ED NOTES
TRANSFER - OUT REPORT:    Verbal report given to HAYLEE Cummins on Betzaida Corona  being transferred to [address] [place] [address] for routine progression of care       Report consisted of patients Situation, Background, Assessment and   Recommendations(SBAR). Information from the following report(s) SBAR, ED Summary, Intake/Output, MAR and Cardiac Rhythm NSR was reviewed with the receiving nurse. Lines:   Peripheral IV 03/29/20 Left;Mid Forearm (Active)   Site Assessment Clean, dry, & intact 3/29/2020  4:37 PM   Phlebitis Assessment 0 3/29/2020  4:37 PM   Infiltration Assessment 0 3/29/2020  4:37 PM   Dressing Status Clean, dry, & intact 3/29/2020  4:37 PM   Hub Color/Line Status Pink 3/29/2020  4:37 PM   Alcohol Cap Used No 3/29/2020  4:37 PM       Peripheral IV 03/29/20 Right;Distal Forearm (Active)   Site Assessment Clean, dry, & intact 3/29/2020  5:57 PM   Phlebitis Assessment 0 3/29/2020  5:57 PM   Infiltration Assessment 0 3/29/2020  5:57 PM   Dressing Status Clean, dry, & intact 3/29/2020  5:57 PM   Hub Color/Line Status Blue 3/29/2020  5:57 PM   Alcohol Cap Used No 3/29/2020  5:57 PM        Opportunity for questions and clarification was provided.       Patient transported with:   Registered Nurse

## 2020-03-29 NOTE — ED PROVIDER NOTES
25-year-old female patient with a history of diabetes presents with reports of ongoing, worsened right little toe infection. Patient states she was seen here proxy 2 weeks ago for the same symptoms. She reported that her shoe it started rubbing her toe approximately 4 days prior to her initial visit. She was imaged with plain x-ray, given ampicillin and discharged home on a course of antibiotics. She reports no improvement in her symptoms and noted worsening redness and black discoloration of the skin over the past 24 hours. She reports a low-grade fever 24 hours ago that resolved without intervention. She reports elevated blood sugars upwards of 400 at home. Takes both insulin and metformin at home to manage her blood sugars but is been unable to control these numbers recently.             Past Medical History:   Diagnosis Date    Diabetic peripheral neuropathy associated with type 2 diabetes mellitus     Nonproliferative diabetic retinopathy     Type 2 diabetes mellitus, uncontrolled        Past Surgical History:   Procedure Laterality Date    HX  SECTION   and     HX OTHER SURGICAL      cyst removed from neck         Family History:   Problem Relation Age of Onset    Diabetes Maternal Grandfather     Cancer Maternal Grandfather         lung (smoker)    Breast Cancer Maternal Grandmother     Crohn's Disease Paternal Grandmother     Cancer Paternal Grandfather         lung (smoker)       Social History     Socioeconomic History    Marital status:      Spouse name: Not on file    Number of children: Not on file    Years of education: Not on file    Highest education level: Not on file   Occupational History    Not on file   Social Needs    Financial resource strain: Not on file    Food insecurity     Worry: Not on file     Inability: Not on file    Transportation needs     Medical: Not on file     Non-medical: Not on file   Tobacco Use    Smoking status: Never Smoker    Smokeless tobacco: Never Used   Substance and Sexual Activity    Alcohol use: No     Alcohol/week: 0.0 standard drinks    Drug use: No    Sexual activity: Not on file   Lifestyle    Physical activity     Days per week: Not on file     Minutes per session: Not on file    Stress: Not on file   Relationships    Social connections     Talks on phone: Not on file     Gets together: Not on file     Attends Christianity service: Not on file     Active member of club or organization: Not on file     Attends meetings of clubs or organizations: Not on file     Relationship status: Not on file    Intimate partner violence     Fear of current or ex partner: Not on file     Emotionally abused: Not on file     Physically abused: Not on file     Forced sexual activity: Not on file   Other Topics Concern    Not on file   Social History Narrative    Not on file         ALLERGIES: Patient has no known allergies. Review of Systems   Constitutional: Negative for chills, diaphoresis and fever. HENT: Negative for congestion, sneezing and sore throat. Eyes: Negative for visual disturbance. Respiratory: Negative for cough, chest tightness, shortness of breath and wheezing. Cardiovascular: Negative for chest pain and leg swelling. Gastrointestinal: Negative for abdominal pain, blood in stool, diarrhea, nausea and vomiting. Endocrine: Negative for polyuria. Genitourinary: Negative for difficulty urinating, dysuria, flank pain, hematuria and urgency. Musculoskeletal: Negative for back pain, myalgias, neck pain and neck stiffness. Skin: Positive for color change and wound. Negative for rash. Neurological: Negative for dizziness, syncope, speech difficulty, weakness, light-headedness, numbness and headaches. Psychiatric/Behavioral: Negative for behavioral problems. All other systems reviewed and are negative.       Vitals:    03/29/20 1619   BP: 108/68   Pulse: (!) 102   Resp: 16   Temp: 97.8 °F (36.6 °C)   SpO2: 98%   Weight: 85.3 kg (188 lb)   Height: 5' 8\" (1.727 m)            Physical Exam  Vitals signs and nursing note reviewed. Constitutional:       General: She is not in acute distress. Appearance: She is well-developed. She is not diaphoretic. Comments: Alert and oriented to person place and time. No acute distress, speaks in clear, fluid sentences. HENT:      Head: Normocephalic and atraumatic. Right Ear: External ear normal.      Left Ear: External ear normal.      Nose: Nose normal.   Eyes:      Pupils: Pupils are equal, round, and reactive to light. Neck:      Musculoskeletal: Normal range of motion. Cardiovascular:      Rate and Rhythm: Normal rate and regular rhythm. Heart sounds: Normal heart sounds. No murmur. No friction rub. No gallop. Pulmonary:      Effort: Pulmonary effort is normal. No respiratory distress. Breath sounds: Normal breath sounds. No stridor. No decreased breath sounds, wheezing, rhonchi or rales. Chest:      Chest wall: No tenderness. Abdominal:      General: There is no distension. Palpations: Abdomen is soft. There is no mass. Tenderness: There is no abdominal tenderness. There is no guarding or rebound. Hernia: No hernia is present. Musculoskeletal: Normal range of motion. General: No tenderness or deformity. Feet:    Feet:      Comments: Evaluation of the patient's right foot reveals generalized cellulitic change and palpable erythema to the lateral forefoot. The patient's fifth digit on the right toe is significantly swollen and appears slightly necrotic. There is serosanguineous drainage from this area with a ulceration noted to the lateral aspect of the fifth digit. Skin:     General: Skin is warm and dry. Neurological:      Mental Status: She is alert and oriented to person, place, and time. Cranial Nerves: No cranial nerve deficit.           MDM  Number of Diagnoses or Management Options     Amount and/or Complexity of Data Reviewed  Clinical lab tests: ordered and reviewed  Tests in the radiology section of CPT®: ordered and reviewed  Tests in the medicine section of CPT®: ordered and reviewed  Independent visualization of images, tracings, or specimens: yes    Risk of Complications, Morbidity, and/or Mortality  Presenting problems: moderate  Diagnostic procedures: low  Management options: moderate    Patient Progress  Patient progress: stable         Procedures

## 2020-03-30 ENCOUNTER — ANESTHESIA EVENT (OUTPATIENT)
Dept: SURGERY | Age: 45
DRG: 617 | End: 2020-03-30
Payer: COMMERCIAL

## 2020-03-30 ENCOUNTER — APPOINTMENT (OUTPATIENT)
Dept: MRI IMAGING | Age: 45
DRG: 617 | End: 2020-03-30
Attending: PHYSICIAN ASSISTANT
Payer: COMMERCIAL

## 2020-03-30 LAB
ANION GAP SERPL CALC-SCNC: 5 MMOL/L (ref 7–16)
BUN SERPL-MCNC: 5 MG/DL (ref 6–23)
CALCIUM SERPL-MCNC: 8 MG/DL (ref 8.3–10.4)
CHLORIDE SERPL-SCNC: 105 MMOL/L (ref 98–107)
CO2 SERPL-SCNC: 28 MMOL/L (ref 21–32)
CREAT SERPL-MCNC: 0.51 MG/DL (ref 0.6–1)
ERYTHROCYTE [DISTWIDTH] IN BLOOD BY AUTOMATED COUNT: 13.8 % (ref 11.9–14.6)
EST. AVERAGE GLUCOSE BLD GHB EST-MCNC: 266 MG/DL
GLUCOSE BLD STRIP.AUTO-MCNC: 207 MG/DL (ref 65–100)
GLUCOSE BLD STRIP.AUTO-MCNC: 256 MG/DL (ref 65–100)
GLUCOSE BLD STRIP.AUTO-MCNC: 286 MG/DL (ref 65–100)
GLUCOSE BLD STRIP.AUTO-MCNC: 312 MG/DL (ref 65–100)
GLUCOSE SERPL-MCNC: 232 MG/DL (ref 65–100)
HBA1C MFR BLD: 10.9 %
HCT VFR BLD AUTO: 33.7 % (ref 35.8–46.3)
HGB BLD-MCNC: 11.4 G/DL (ref 11.7–15.4)
LACTATE SERPL-SCNC: 0.9 MMOL/L (ref 0.4–2)
MCH RBC QN AUTO: 29.5 PG (ref 26.1–32.9)
MCHC RBC AUTO-ENTMCNC: 33.8 G/DL (ref 31.4–35)
MCV RBC AUTO: 87.1 FL (ref 79.6–97.8)
NRBC # BLD: 0.02 K/UL (ref 0–0.2)
PLATELET # BLD AUTO: 243 K/UL (ref 150–450)
PMV BLD AUTO: 8.6 FL (ref 9.4–12.3)
POTASSIUM SERPL-SCNC: 3.5 MMOL/L (ref 3.5–5.1)
RBC # BLD AUTO: 3.87 M/UL (ref 4.05–5.2)
SODIUM SERPL-SCNC: 138 MMOL/L (ref 136–145)
WBC # BLD AUTO: 6.2 K/UL (ref 4.3–11.1)

## 2020-03-30 PROCEDURE — 83036 HEMOGLOBIN GLYCOSYLATED A1C: CPT

## 2020-03-30 PROCEDURE — 74011250636 HC RX REV CODE- 250/636: Performed by: INTERNAL MEDICINE

## 2020-03-30 PROCEDURE — 74011636637 HC RX REV CODE- 636/637: Performed by: FAMILY MEDICINE

## 2020-03-30 PROCEDURE — 74011250637 HC RX REV CODE- 250/637: Performed by: INTERNAL MEDICINE

## 2020-03-30 PROCEDURE — 65270000029 HC RM PRIVATE

## 2020-03-30 PROCEDURE — 85027 COMPLETE CBC AUTOMATED: CPT

## 2020-03-30 PROCEDURE — 80048 BASIC METABOLIC PNL TOTAL CA: CPT

## 2020-03-30 PROCEDURE — 36415 COLL VENOUS BLD VENIPUNCTURE: CPT

## 2020-03-30 PROCEDURE — 73720 MRI LWR EXTREMITY W/O&W/DYE: CPT

## 2020-03-30 PROCEDURE — 74011000258 HC RX REV CODE- 258: Performed by: INTERNAL MEDICINE

## 2020-03-30 PROCEDURE — 74011636637 HC RX REV CODE- 636/637: Performed by: INTERNAL MEDICINE

## 2020-03-30 PROCEDURE — 82962 GLUCOSE BLOOD TEST: CPT

## 2020-03-30 PROCEDURE — A9575 INJ GADOTERATE MEGLUMI 0.1ML: HCPCS | Performed by: INTERNAL MEDICINE

## 2020-03-30 PROCEDURE — 83605 ASSAY OF LACTIC ACID: CPT

## 2020-03-30 RX ORDER — SODIUM CHLORIDE 0.9 % (FLUSH) 0.9 %
10 SYRINGE (ML) INJECTION
Status: COMPLETED | OUTPATIENT
Start: 2020-03-30 | End: 2020-03-30

## 2020-03-30 RX ORDER — GADOTERATE MEGLUMINE 376.9 MG/ML
17 INJECTION INTRAVENOUS
Status: COMPLETED | OUTPATIENT
Start: 2020-03-30 | End: 2020-03-30

## 2020-03-30 RX ORDER — POTASSIUM CHLORIDE 20 MEQ/1
40 TABLET, EXTENDED RELEASE ORAL
Status: COMPLETED | OUTPATIENT
Start: 2020-03-30 | End: 2020-03-30

## 2020-03-30 RX ORDER — INSULIN GLARGINE 100 [IU]/ML
95 INJECTION, SOLUTION SUBCUTANEOUS DAILY
Status: DISCONTINUED | OUTPATIENT
Start: 2020-03-30 | End: 2020-03-30

## 2020-03-30 RX ORDER — INSULIN GLARGINE 100 [IU]/ML
95 INJECTION, SOLUTION SUBCUTANEOUS
Status: DISCONTINUED | OUTPATIENT
Start: 2020-03-30 | End: 2020-03-31

## 2020-03-30 RX ADMIN — ACETAMINOPHEN 650 MG: 325 TABLET, FILM COATED ORAL at 20:12

## 2020-03-30 RX ADMIN — VANCOMYCIN HYDROCHLORIDE 1500 MG: 10 INJECTION, POWDER, LYOPHILIZED, FOR SOLUTION INTRAVENOUS at 20:31

## 2020-03-30 RX ADMIN — Medication 10 ML: at 12:01

## 2020-03-30 RX ADMIN — ONDANSETRON 4 MG: 2 INJECTION INTRAMUSCULAR; INTRAVENOUS at 07:57

## 2020-03-30 RX ADMIN — HEPARIN SODIUM 5000 UNITS: 5000 INJECTION INTRAVENOUS; SUBCUTANEOUS at 22:30

## 2020-03-30 RX ADMIN — INSULIN LISPRO 9 UNITS: 100 INJECTION, SOLUTION INTRAVENOUS; SUBCUTANEOUS at 22:33

## 2020-03-30 RX ADMIN — SODIUM CHLORIDE 100 ML/HR: 900 INJECTION, SOLUTION INTRAVENOUS at 04:02

## 2020-03-30 RX ADMIN — POTASSIUM CHLORIDE 40 MEQ: 1500 TABLET, EXTENDED RELEASE ORAL at 07:42

## 2020-03-30 RX ADMIN — Medication 5 ML: at 22:33

## 2020-03-30 RX ADMIN — Medication 5 ML: at 12:40

## 2020-03-30 RX ADMIN — INSULIN LISPRO 6 UNITS: 100 INJECTION, SOLUTION INTRAVENOUS; SUBCUTANEOUS at 12:38

## 2020-03-30 RX ADMIN — Medication 5 ML: at 05:55

## 2020-03-30 RX ADMIN — Medication 1 AMPULE: at 09:51

## 2020-03-30 RX ADMIN — ONDANSETRON 4 MG: 2 INJECTION INTRAMUSCULAR; INTRAVENOUS at 17:52

## 2020-03-30 RX ADMIN — INSULIN LISPRO 4 UNITS: 100 INJECTION, SOLUTION INTRAVENOUS; SUBCUTANEOUS at 17:29

## 2020-03-30 RX ADMIN — VANCOMYCIN HYDROCHLORIDE 1500 MG: 10 INJECTION, POWDER, LYOPHILIZED, FOR SOLUTION INTRAVENOUS at 07:44

## 2020-03-30 RX ADMIN — Medication 1 AMPULE: at 22:29

## 2020-03-30 RX ADMIN — GADOTERATE MEGLUMINE 17 ML: 376.9 INJECTION INTRAVENOUS at 12:01

## 2020-03-30 RX ADMIN — CEFTRIAXONE SODIUM 2 G: 2 INJECTION, POWDER, FOR SOLUTION INTRAMUSCULAR; INTRAVENOUS at 14:13

## 2020-03-30 RX ADMIN — INSULIN LISPRO 8 UNITS: 100 INJECTION, SOLUTION INTRAVENOUS; SUBCUTANEOUS at 07:42

## 2020-03-30 RX ADMIN — Medication 1 AMPULE: at 00:01

## 2020-03-30 RX ADMIN — ATORVASTATIN CALCIUM 20 MG: 10 TABLET, FILM COATED ORAL at 22:30

## 2020-03-30 NOTE — PROGRESS NOTES
Consulted general surgery via [employer] messaging to [employer], per orders. Simran Dewitt RN aware.

## 2020-03-30 NOTE — PROGRESS NOTES
Hospitalist Progress Note    3/30/2020  Admit Date: 3/29/2020  4:21 PM   NAME: Rosales Lambert   :  1975   MRN:  [id number]   Attending: Darrius Valentin MD  PCP:  Linda Morales MD    SUBJECTIVE:   71-year-old  lady with DM type II, diabetic neuropathy, nonproliferative diabetic retinopathy, presented to the ED at [facility] complaining of worsening redness and discoloration of the right fifth toe. The patient was actually seen in the ED 2 weeks ago for the foot infection and was discharged home on Augmentin. Initial improvement, but 2 days ago she noticed some worsening redness affecting the lateral aspect of the right foot and also the right fifth toe. Of note, she had a callus in the area that she probably rubbed against her shoes. Since then, the right foot and right fifth toe have been red, warm and swollen. The skin over the right fifth toe is denuded and is exhibiting some darkish discoloration without being black all over. WBC 7.9, glucose 390, creatinine 0.89, lactic acid 3.6. Right foot x-ray did not show any acute osseous or joint abnormalities. T 97.8 F, HR low 100s in ED. She was started on IV ceftriaxone and vancomycin. Pt reports ongoing nausea this AM, poor appetite. No sensation to feet. States glucoses are \"usually bad\" at home.       Review of Systems negative with exception of pertinent positives noted above  PHYSICAL EXAM     Patient Vitals for the past 24 hrs:   Temp Pulse Resp BP SpO2   20 0352 98.8 °F (37.1 °C) 90 18 116/79 98 %   20 2324 99 °F (37.2 °C) 100 18 127/78 97 %   20 1941 98.9 °F (37.2 °C) 98 20 136/76 98 %   20 1857  (!) 102  130/84 97 %   20 1827  (!) 101  131/86 97 %   20 1757    135/66    20 1619 97.8 °F (36.6 °C) (!) 102 16 108/68 98 %     Oxygen Therapy  O2 Sat (%): 98 % (20 0352)  Pulse via Oximetry: 102 beats per minute (20 1857)  O2 Device: Room air (20 9027)    Intake/Output Summary (Last 24 hours) at 3/30/2020 0819  Last data filed at 3/30/2020 0510  Gross per 24 hour   Intake 1878 ml   Output 2175 ml   Net -297 ml      General:          Well nourished. Alert. CV:                  RRR. No murmur, rub, or gallop. Lungs:             CTAB. No wheezing, rhonchi, or rales. Abdomen:        Soft, nontender, nondistended. Bowel sounds normal.   Extremities:     Erythema, swelling and warmth of the right forefoot and right 5th toe, with blackish discoloration and denuded skin to right 5th toe, erythema receded from previous markings  Neurologic:      CN II-XII grossly intact. Diminished sensation in the LE bilaterally. Recent Results (from the past 24 hour(s))   CBC WITH AUTOMATED DIFF    Collection Time: 03/29/20  4:43 PM   Result Value Ref Range    WBC 7.9 4.3 - 11.1 K/uL    RBC 4.53 4.05 - 5.2 M/uL    HGB 13.5 11.7 - 15.4 g/dL    HCT 39.6 35.8 - 46.3 %    MCV 87.4 79.6 - 97.8 FL    MCH 29.8 26.1 - 32.9 PG    MCHC 34.1 31.4 - 35.0 g/dL    RDW 13.7 11.9 - 14.6 %    PLATELET 095 176 - 441 K/uL    MPV 9.2 (L) 9.4 - 12.3 FL    ABSOLUTE NRBC 0.03 0.0 - 0.2 K/uL    DF AUTOMATED      NEUTROPHILS 66 43 - 78 %    LYMPHOCYTES 18 13 - 44 %    MONOCYTES 8 4.0 - 12.0 %    EOSINOPHILS 7 0.5 - 7.8 %    BASOPHILS 1 0.0 - 2.0 %    IMMATURE GRANULOCYTES 1 0.0 - 5.0 %    ABS. NEUTROPHILS 5.2 1.7 - 8.2 K/UL    ABS. LYMPHOCYTES 1.4 0.5 - 4.6 K/UL    ABS. MONOCYTES 0.6 0.1 - 1.3 K/UL    ABS. EOSINOPHILS 0.5 0.0 - 0.8 K/UL    ABS. BASOPHILS 0.1 0.0 - 0.2 K/UL    ABS. IMM.  GRANS. 0.0 0.0 - 0.5 K/UL   METABOLIC PANEL, COMPREHENSIVE    Collection Time: 03/29/20  4:43 PM   Result Value Ref Range    Sodium 129 (L) 136 - 145 mmol/L    Potassium 4.6 3.5 - 5.1 mmol/L    Chloride 96 (L) 98 - 107 mmol/L    CO2 26 21 - 32 mmol/L    Anion gap 7 7 - 16 mmol/L    Glucose 390 (H) 65 - 100 mg/dL    BUN 7 6 - 23 MG/DL    Creatinine 0.89 0.6 - 1.0 MG/DL    GFR est AA >60 >60 ml/min/1.73m2    GFR est non-AA >60 >60 ml/min/1.73m2    Calcium 8.8 8.3 - 10.4 MG/DL    Bilirubin, total 0.5 0.2 - 1.1 MG/DL    ALT (SGPT) 16 12 - 65 U/L    AST (SGOT) 38 (H) 15 - 37 U/L    Alk.  phosphatase 102 50 - 130 U/L    Protein, total 8.0 6.3 - 8.2 g/dL    Albumin 2.9 (L) 3.5 - 5.0 g/dL    Globulin 5.1 (H) 2.3 - 3.5 g/dL    A-G Ratio 0.6 (L) 1.2 - 3.5     LACTIC ACID    Collection Time: 03/29/20  4:43 PM   Result Value Ref Range    Lactic acid 3.6 (HH) 0.4 - 2.0 MMOL/L   CULTURE, BLOOD    Collection Time: 03/29/20  5:57 PM   Result Value Ref Range    Special Requests: LEFT  FOREARM        Culture result: NO GROWTH AFTER 13 HOURS     CULTURE, BLOOD    Collection Time: 03/29/20  6:00 PM   Result Value Ref Range    Special Requests: RIGHT  FOREARM        Culture result: NO GROWTH AFTER 13 HOURS     C REACTIVE PROTEIN, QT    Collection Time: 03/29/20  7:41 PM   Result Value Ref Range    C-Reactive protein 14.6 (H) 0.0 - 0.9 mg/dL   LACTIC ACID    Collection Time: 03/29/20  7:41 PM   Result Value Ref Range    Lactic acid 2.6 (HH) 0.4 - 2.0 MMOL/L   GLUCOSE, POC    Collection Time: 03/29/20  9:07 PM   Result Value Ref Range    Glucose (POC) 151 (H) 65 - 225 mg/dL   METABOLIC PANEL, BASIC    Collection Time: 03/30/20  4:59 AM   Result Value Ref Range    Sodium 138 136 - 145 mmol/L    Potassium 3.5 3.5 - 5.1 mmol/L    Chloride 105 98 - 107 mmol/L    CO2 28 21 - 32 mmol/L    Anion gap 5 (L) 7 - 16 mmol/L    Glucose 232 (H) 65 - 100 mg/dL    BUN 5 (L) 6 - 23 MG/DL    Creatinine 0.51 (L) 0.6 - 1.0 MG/DL    GFR est AA >60 >60 ml/min/1.73m2    GFR est non-AA >60 >60 ml/min/1.73m2    Calcium 8.0 (L) 8.3 - 10.4 MG/DL   CBC W/O DIFF    Collection Time: 03/30/20  4:59 AM   Result Value Ref Range    WBC 6.2 4.3 - 11.1 K/uL    RBC 3.87 (L) 4.05 - 5.2 M/uL    HGB 11.4 (L) 11.7 - 15.4 g/dL    HCT 33.7 (L) 35.8 - 46.3 %    MCV 87.1 79.6 - 97.8 FL    MCH 29.5 26.1 - 32.9 PG    MCHC 33.8 31.4 - 35.0 g/dL    RDW 13.8 11.9 - 14.6 %    PLATELET 415 082 - 450 K/uL    MPV 8.6 (L) 9.4 - 12.3 FL    ABSOLUTE NRBC 0.02 0.0 - 0.2 K/uL   LACTIC ACID    Collection Time: 03/30/20  4:59 AM   Result Value Ref Range    Lactic acid 0.9 0.4 - 2.0 MMOL/L   HEMOGLOBIN A1C WITH EAG    Collection Time: 03/30/20  4:59 AM   Result Value Ref Range    Hemoglobin A1c 10.9 %    Est. average glucose 266 mg/dL   GLUCOSE, POC    Collection Time: 03/30/20  7:22 AM   Result Value Ref Range    Glucose (POC) 312 (H) 65 - 100 mg/dL       Micro: All Micro Results     Procedure Component Value Units Date/Time    CULTURE, BLOOD [[id number]] Collected:  03/29/20 1800    Order Status:  Completed Specimen:  Blood Updated:  03/30/20 0758     Special Requests: --        RIGHT  FOREARM       Culture result: NO GROWTH AFTER 13 HOURS       CULTURE, BLOOD [[id number]] Collected:  03/29/20 1757    Order Status:  Completed Specimen:  Blood Updated:  03/30/20 0758     Special Requests: --        LEFT  FOREARM       Culture result: NO GROWTH AFTER 13 HOURS             Other studies last 24 hours:  Xr Foot Rt Min 3 V    Result Date: 3/29/2020  Impression: No acute osseous or joint abnormalities. If clinical concern persists, further evaluation with either 3 phase bone scan or MRI is recommended to exclude osteomyelitis       All imaging, labs reviewed.     ASSESSMENT      Hospital Problems as of 3/30/2020 Date Reviewed: 11/5/2019          Codes Class Noted - Resolved POA    * (Principal) [facility] ICD-10-CM: E11.628, L08.9  ICD-9-CM: 250.80, 686.9  3/29/2020 - Present Unknown        Uncontrolled type 2 diabetes mellitus ICD-10-CM: E11.65, Z79.4  ICD-9-CM: 250.02, V58.67  Unknown - Present Yes        Nonproliferative diabetic retinopathy  ICD-10-CM: E11.3299  ICD-9-CM: 250.50, 362.03  Unknown - Present Yes        Cellulitis ICD-10-CM: L03.90  ICD-9-CM: 682.9  3/18/2016 - Present Yes            Diabetic foot infection  - cont rocephin, Vanc  - ID, ortho consulted  - decrease IVFs  - f/u blood cxs    Mild hyponatremia  Some component of pseudohyponatremia on admit. Resolved with IVFs.     Uncontrolled DM2  Diabetic retinopathy and neuropathy  Follows with endocrine but very difficult time maintaining control per pt.  - increase Lantus to 95 units  - additional 15 units Novolog now  - SSi  - check A1c (last one 9.4 in 8/19)    HLD  - cont statin    Proph - heparin SQ  Dispo - likely 2-3 days      Signed By: Kevin Solares MD     March 30, 2020

## 2020-03-30 NOTE — CDMP QUERY
Pt admitted with diabetic foot infection. Pt noted to have low sodium level. If possible, please document in the progress notes and d/c summary if you are evaluating and / or treating any of the following: ? Hyponatremia ? Pseudo hyponatremia 
? Other, please specify ? Clinically unable to determine ? The medical record reflects the following: 
  Risk Factors: DM Clinical Indicators: Na level on admission 129 which has now improved to 138 Treatment: Continuous NS infusing @ 100/hr Thank You, Mary Cutler RN CDS  
CDI

## 2020-03-30 NOTE — PROGRESS NOTES
Reports having had a BM earlier, stool liquid r/t metformin. PTA med list reviewed. C/o headache, denying desire for pain med. Right foot dsg clean, dry, intact. Dr. Adams Nancy aware of latest lactic acid result with no additional orders.

## 2020-03-30 NOTE — PROGRESS NOTES
Alvaro Isbell RN informed that a message has been sent to Dr. Jose Brown for surgical consult to be changed to orthopedic surgery consult, per [facility] in General Surgery.

## 2020-03-30 NOTE — PROGRESS NOTES
Care Management Interventions  PCP Verified by CM: Yes  Mode of Transport at Discharge: Other (see comment)  Transition of Care Consult (CM Consult): Other  Current Support Network: Lives with Spouse  Confirm Follow Up Transport: Family  The Patient and/or Patient Representative was Provided with a Choice of Provider and Agrees with the Discharge Plan?: Yes  Freedom of Choice List was Provided with Basic Dialogue that Supports the Patient's Individualized Plan of Care/Goals, Treatment Preferences and Shares the Quality Data Associated with the Providers?: Yes  Discharge Location  Discharge Placement: Home  Visited with pt regarding plans for discharge, pt lives at home with spouse, inde with ADL's, home is a split level with 16 steps in total. States has been diabetic for 11 yrs and has always struggled with regulating her BS. Then states that she only goes to her PCP when she is \"sick\". She sees Dr. Coleen Morgan. CM will continue to follow. ID seeing pt, see note below that was copy/pasted. ...... Plan:   · Continue ceftriaxone and vancomycin at present  · MRI ordered by Orthopedics  · I am concerned about viability of toe. Discussed with patient possibility of amputation. Await Orthopedics opinion. See surgical note form this am that was copied/pasted. .. [place] Assessment and Plan:   Right 5th toe infection: with obtain MRI right foot w/ contrast. Discussed with Dr. Dawson Hauser who will see patient today and discuss surgical options     GIGI Casillas  3/30/2020,  9:22 AM      @1320 Saw pt in interdisciplinarily rounds with the following disciplines: Physician, Case Management, Nursing, Dietary,Therapy and Pharmacy. The plan of care was discussed along with discharge date/ location.    Pt may d/c home with family in 2-3 days per MD.

## 2020-03-30 NOTE — PROGRESS NOTES
To room via wheelchair, from [employer]. Oriented to bed controls, nurse call light, gown given. Reports steady standing and walking. Socks offered with rationale given. pt states she is unable to tolerate wearing socks, agreeing to wear her slippers when out of bed.

## 2020-03-30 NOTE — INTERDISCIPLINARY ROUNDS
Interdisciplinary team rounds were held 3/30/2020 with the following team members:Care Management, Nursing, Physical Therapy and Physician and the patient. Plan of Care options were discussed with the team and the patient. The patient would benefit from a screening and/or visit from Ortho. Amputation scheduled for tomorrow.

## 2020-03-30 NOTE — PROGRESS NOTES
Humalog insulin 2 units given SC for SQBS 151. [place] and crackers given for c/o hunger. Pt informed she is not to eat or drink anything after midnight, rationale given, agreed. Aware of surgical consult in a.m.

## 2020-03-30 NOTE — PROGRESS NOTES
Pharmacokinetic Consult to Pharmacist    Carmenemmy Marcos is a 38 y.o. female being treated forSI with vanc/soila. Height: 5' 8\" (172.7 cm)  Weight: 85.3 kg (188 lb)  Lab Results   Component Value Date/Time    BUN 7 03/29/2020 04:43 PM    Creatinine 0.89 03/29/2020 04:43 PM    WBC 7.9 03/29/2020 04:43 PM    Lactic acid 2.6 (HH) 03/29/2020 07:41 PM      Estimated Creatinine Clearance: 93.3 mL/min (based on SCr of 0.89 mg/dL). CULTURES:  bcx2- pending    Lab Results   Component Value Date/Time    Vancomycin,trough 4.6 (L) 03/20/2016 12:02 PM       Day 1 of vancomycin. Goal trough is 15-20mcg/ml. Will give vanc 1.5gm q12hr and monitor per protocol. Will continue to follow patient.       Thank you for this consult,  Joshua Boo, PharmD

## 2020-03-30 NOTE — PROGRESS NOTES
Non-stick dsg applied to right foot wound, covering with 4 x 4 dsg and kerlex with pt initially objecting. Explained rationale for covering wounds. Pt allowed nurse to cover wounds lightly. Discussed plan of care. Agrees to call for asst to be out of bed if steadiness questionable. Voiding without difficulty. Requesting food.

## 2020-03-30 NOTE — PROGRESS NOTES
03/29/20 2000   Dual Skin Pressure Injury Assessment   Dual Skin Pressure Injury Assessment WDL   Second Care Provider (Based on Facility Policy) Charito Rogers RN   Skin Integumentary   Skin Integumentary (WDL) X   Skin Color Appropriate for ethnicity; Purple;Red; Other (comment)  (Right ft 5th toe red, purple, black, wound open, blood noted)   Skin Condition/Temp Dry; Warm   Skin Integrity Other (comment)  (L index finger scab, R ft 5th toe wound & betw 3rd/4th toe )    Pressure  Injury Documentation No Pressure Injury Noted-Pressure Ulcer Prevention Initiated

## 2020-03-30 NOTE — CONSULTS
Infectious Disease Consult    Impression:   · R fifth toe diabetic foot infection, suspect bone and deep tissue involvement  · Type 2 DM with neurovascular complications, poorly controlled    Plan:   · Continue ceftriaxone and vancomycin at present  · MRI ordered by Orthopedics  · I am concerned about viability of toe. Discussed with patient possibility of amputation. Await Orthopedics opinion. Anti-infectives:   1. Vancomycin  2. Ceftriaxone    Subjective:   Date of Consultation:  March 30, 2020  Date of Admission: 3/29/2020   Referring Physician: Lacie Simon  Reason for Consult: DFI    Patient is a 38 y.o. female with poorly controlled Type 2 DM who was admitted on 3/29/2020 with complaints of  Worsening redness in R fith toe. Seen in ED 2 weeks earlier for same and sent out with Augmentin. Initially improved but for 48 hrs increasing redness and swelling. Skin on 5th toe came off. X-ray then with diffuse soft tissue swelling but no wound or signs bony changed. CRP 3.5.        Patient Active Problem List   Diagnosis Code    Cellulitis L03.90    Neuropathy G62.9    Uncontrolled type 2 diabetes mellitus E11.65, Z79.4    Nonproliferative diabetic retinopathy  E11.3299    Diabetic peripheral neuropathy associated with type 2 diabetes mellitus E11.42    Elevated blood pressure R03.0    Mixed hyperlipidemia E78.2    Encounter for medication management Z79.899    Weakness of both legs R29.898    Paresthesia R20.2    Foot drop, left foot M21.372    Postural imbalance R29.3    Diabetic foot infection (HCC) E11.628, L08.9     Past Medical History:   Diagnosis Date    Diabetic peripheral neuropathy associated with type 2 diabetes mellitus     Nonproliferative diabetic retinopathy     Type 2 diabetes mellitus, uncontrolled       Family History   Problem Relation Age of Onset    Diabetes Maternal Grandfather     Cancer Maternal Grandfather         lung (smoker)    Breast Cancer Maternal Grandmother     Crohn's Disease Paternal Grandmother     Cancer Paternal Grandfather         lung (smoker)      Social History     Tobacco Use    Smoking status: Never Smoker    Smokeless tobacco: Never Used   Substance Use Topics    Alcohol use: No     Alcohol/week: 0.0 standard drinks     Past Surgical History:   Procedure Laterality Date    HX  SECTION   and     HX OTHER SURGICAL  1999    cyst removed from neck      Prior to Admission medications    Medication Sig Start Date End Date Taking? Authorizing Provider   escitalopram oxalate (LEXAPRO) 10 mg tablet Take 10 mg by mouth daily. Pt reports she takes as needed. 10/18/19  Yes Provider, Historical   JARDIANCE 25 mg tablet Take 1 Tab by mouth daily. 19  Yes Richard Brantley   HUMCHI [facility] INSULIN 100 unit/mL kwikpen Take 20 units before each meal plus correction 3/50 >150, max daily dose 100 units 19  Yes Deirdre Busch PA-C   FREESTYLE MILIND 14 DAY READER misc Use to check blood glucose 4 times daily E11.65 19  Yes Deirdre Busch PA-C   FREESTYLE MILIND 14 DAY SENSOR kit Use to check blood glucose 4 times daily E11.65 19  Yes Pernell Busch PA-C   metFORMIN ER (GLUCOPHAGE XR) 750 mg tablet Take 1 Tab by mouth daily. 19  Yes Deirdre Busch PA-C   TRESIBA FLEXTOUCH U-200 200 unit/mL (3 mL) inpn 80 Units by SubCUTAneous route daily. 19  Yes Deirdre Busch PA-C   aspirin/acetaminophen/caffeine (EXCEDRIN MIGRAINE PO) Take 1 Tab by mouth as needed. Yes Provider, Historical   atorvastatin (LIPITOR) 20 mg tablet Take 1 Tab by mouth daily. 19  Yes Deirdre Busch PA-C   ONE TOUCH DELICA 33 gauge misc Check blood glucose three times daily. Dx E11.65 18  Yes Rosalinda Moreno MD   FRANSISCO PEN NEEDLE 32 gauge x \" ndle 2 injections per day. Dx E11.65 18  Yes Rosalinda Moreno MD   gabapentin (NEURONTIN) 300 mg capsule Take 1 Cap by mouth two (2) times a day.   Patient taking differently: Take 300 mg by mouth as needed. 18  Yes Bandar Moreno MD     No Known Allergies         Review of Systems:  A comprehensive review of systems was negative except for that written in the History of Present Illness. Objective:   Blood pressure 116/79, pulse 90, temperature 98.8 °F (37.1 °C), resp. rate 18, height 5' 8\" (1.727 m), weight 85.3 kg (188 lb), SpO2 98 %. Temp (24hrs), Av.6 °F (37 °C), Min:97.8 °F (36.6 °C), Max:99 °F (37.2 °C)       Lines:     Exam:     General: NC/AT, alert, looks stated age, in NAD   HEENT: PERRL, non-icteric sclera   Neck: supple, symmetric   Pulmonary:good air movement    Abdomen: non-distended    Skin: R 5th toe with lateral ulcer, darkened areas, diffuse swelling, erythema of foot to mid foot   MSK: see above   Extremities: see above   Psychiatric: mood appropriate to situation       Data Review:   Recent Results (from the past 24 hour(s))   CBC WITH AUTOMATED DIFF    Collection Time: 20  4:43 PM   Result Value Ref Range    WBC 7.9 4.3 - 11.1 K/uL    RBC 4.53 4.05 - 5.2 M/uL    HGB 13.5 11.7 - 15.4 g/dL    HCT 39.6 35.8 - 46.3 %    MCV 87.4 79.6 - 97.8 FL    MCH 29.8 26.1 - 32.9 PG    MCHC 34.1 31.4 - 35.0 g/dL    RDW 13.7 11.9 - 14.6 %    PLATELET 239 040 - 354 K/uL    MPV 9.2 (L) 9.4 - 12.3 FL    ABSOLUTE NRBC 0.03 0.0 - 0.2 K/uL    DF AUTOMATED      NEUTROPHILS 66 43 - 78 %    LYMPHOCYTES 18 13 - 44 %    MONOCYTES 8 4.0 - 12.0 %    EOSINOPHILS 7 0.5 - 7.8 %    BASOPHILS 1 0.0 - 2.0 %    IMMATURE GRANULOCYTES 1 0.0 - 5.0 %    ABS. NEUTROPHILS 5.2 1.7 - 8.2 K/UL    ABS. LYMPHOCYTES 1.4 0.5 - 4.6 K/UL    ABS. MONOCYTES 0.6 0.1 - 1.3 K/UL    ABS. EOSINOPHILS 0.5 0.0 - 0.8 K/UL    ABS. BASOPHILS 0.1 0.0 - 0.2 K/UL    ABS. IMM.  GRANS. 0.0 0.0 - 0.5 K/UL   METABOLIC PANEL, COMPREHENSIVE    Collection Time: 20  4:43 PM   Result Value Ref Range    Sodium 129 (L) 136 - 145 mmol/L    Potassium 4.6 3.5 - 5.1 mmol/L    Chloride 96 (L) 98 - 107 mmol/L CO2 26 21 - 32 mmol/L    Anion gap 7 7 - 16 mmol/L    Glucose 390 (H) 65 - 100 mg/dL    BUN 7 6 - 23 MG/DL    Creatinine 0.89 0.6 - 1.0 MG/DL    GFR est AA >60 >60 ml/min/1.73m2    GFR est non-AA >60 >60 ml/min/1.73m2    Calcium 8.8 8.3 - 10.4 MG/DL    Bilirubin, total 0.5 0.2 - 1.1 MG/DL    ALT (SGPT) 16 12 - 65 U/L    AST (SGOT) 38 (H) 15 - 37 U/L    Alk.  phosphatase 102 50 - 130 U/L    Protein, total 8.0 6.3 - 8.2 g/dL    Albumin 2.9 (L) 3.5 - 5.0 g/dL    Globulin 5.1 (H) 2.3 - 3.5 g/dL    A-G Ratio 0.6 (L) 1.2 - 3.5     LACTIC ACID    Collection Time: 03/29/20  4:43 PM   Result Value Ref Range    Lactic acid 3.6 (HH) 0.4 - 2.0 MMOL/L   C REACTIVE PROTEIN, QT    Collection Time: 03/29/20  7:41 PM   Result Value Ref Range    C-Reactive protein 14.6 (H) 0.0 - 0.9 mg/dL   LACTIC ACID    Collection Time: 03/29/20  7:41 PM   Result Value Ref Range    Lactic acid 2.6 (HH) 0.4 - 2.0 MMOL/L   GLUCOSE, POC    Collection Time: 03/29/20  9:07 PM   Result Value Ref Range    Glucose (POC) 151 (H) 65 - 228 mg/dL   METABOLIC PANEL, BASIC    Collection Time: 03/30/20  4:59 AM   Result Value Ref Range    Sodium 138 136 - 145 mmol/L    Potassium 3.5 3.5 - 5.1 mmol/L    Chloride 105 98 - 107 mmol/L    CO2 28 21 - 32 mmol/L    Anion gap 5 (L) 7 - 16 mmol/L    Glucose 232 (H) 65 - 100 mg/dL    BUN 5 (L) 6 - 23 MG/DL    Creatinine 0.51 (L) 0.6 - 1.0 MG/DL    GFR est AA >60 >60 ml/min/1.73m2    GFR est non-AA >60 >60 ml/min/1.73m2    Calcium 8.0 (L) 8.3 - 10.4 MG/DL   CBC W/O DIFF    Collection Time: 03/30/20  4:59 AM   Result Value Ref Range    WBC 6.2 4.3 - 11.1 K/uL    RBC 3.87 (L) 4.05 - 5.2 M/uL    HGB 11.4 (L) 11.7 - 15.4 g/dL    HCT 33.7 (L) 35.8 - 46.3 %    MCV 87.1 79.6 - 97.8 FL    MCH 29.5 26.1 - 32.9 PG    MCHC 33.8 31.4 - 35.0 g/dL    RDW 13.8 11.9 - 14.6 %    PLATELET 750 132 - 937 K/uL    MPV 8.6 (L) 9.4 - 12.3 FL    ABSOLUTE NRBC 0.02 0.0 - 0.2 K/uL   LACTIC ACID    Collection Time: 03/30/20  4:59 AM   Result Value Ref Range    Lactic acid 0.9 0.4 - 2.0 MMOL/L   GLUCOSE, POC    Collection Time: 03/30/20  7:22 AM   Result Value Ref Range    Glucose (POC) 312 (H) 65 - 100 mg/dL        Microbiology:    All Micro Results     Procedure Component Value Units Date/Time    CULTURE, BLOOD [[id number]] Collected:  03/29/20 1757    Order Status:  Completed Specimen:  Blood Updated:  03/29/20 1805    CULTURE, BLOOD [[id number]] Collected:  03/29/20 1800    Order Status:  Completed Specimen:  Blood Updated:  03/29/20 1805          Studies/Imaging:      Study Result     Clinical History: The patient is a 38years year old Female presenting with  symptoms of infection of the little toe, ?osteo.     Comparison:  Right foot films 3/12/2020     Findings:     3 views of the right foot were obtained.      No fracture or dislocation is identified. There is no evidence of osseous  erosion to suggest osteomyelitis.     Joint spaces are well-maintained.     IMPRESSION  Impression:     No acute osseous or joint abnormalities.  If clinical concern persists, further  evaluation with either 3 phase bone scan or MRI is recommended to exclude  osteomyelitis                Signed By: Beena Maurer MD     March 30, 2020

## 2020-03-30 NOTE — PROGRESS NOTES
Right foot dsg falling off after pt up to bathroom. Dsg changed, telfa, 4 x 4 gauze, kerlex. C/o being thirsty. Aware of NPO status and surgical consult to be placed this morning.

## 2020-03-30 NOTE — PROGRESS NOTES
Resting quietly in bed. Resp even, unlab, skin warm, dry with scattered areas of redness, pt reports she has eczema. RLE, ankle and foot swollen with 2+ pitting edema. Right foot inflamed, fifth toe red, purplish, black with open wound, scant amt of blood noted. Skin cracked between third and fourth toes, right ft, no bleeding or drainage, pedal pulse palpable, limited movement of toes. Pt reports numbness in both feet r/t neuropathy and nerve damage to LLE. Denies any pain. Left abdomen with blood sugar sensor noted, pt uses at home.

## 2020-03-30 NOTE — PROGRESS NOTES
C/o headache and nausea. Declines offer for pain and nausea med. Ice pack given upon request. Emesis bag given. Continues to void without difficulty.

## 2020-03-30 NOTE — PROGRESS NOTES
Vanc Consult    MD ordering: Ifrah RICHARDSON following? no  Indication: ssti  DOT:  7  days  Goal level(s): 15-20mcg/ml    Ht Readings from Last 1 Encounters:   20 5' 8\" (1.727 m)      Wt Readings from Last 1 Encounters:   20 85.3 kg (188 lb)         Temp (24hrs), Av.4 °F (36.9 °C), Min:97.8 °F (36.6 °C), Max:98.9 °F (37.2 °C)    Dosing weight: 85.3 kg  38 y.o. Date:  Dose/Freq Admin Times Level/Time:   3/29 1750mg x1 1725    3/30 1500mg q12h () ()    3/31 1500 mg q12h () Trough ()                 Recent Labs     20  1941 20  1643   BUN  --  7   CREA  --  0.89   WBC  --  7.9   LAC 2.6* 3.6*     Estimated Creatinine Clearance: 93.3 mL/min (based on SCr of 0.89 mg/dL). Lab Results   Component Value Date/Time    Vancomycin,trough 4.6 (L) 2016 12:02 PM       Day 2  Assessment and Plan: Will continue with Vancomycin 1500 mg IV every 12 hours. Vancomycin trough ordered for tomorrow at 7 am.  Will continue to monitor closely.     Jony Mata, PharmD, BCPS

## 2020-03-30 NOTE — CONSULTS
[facility]  Consultation Note    Patient ID:  Name: Gavin Lucio  MRN: [id number]  AGE: 38 y.o.  : 1975    Date of Consultation:  2020  Referring Physician:  Hospitalist     Subjective: Pt complains of right toe infection and discoloration. Patient is a 38 y.o. female with poorly controlled Type 2 DM who was admitted on 3/29/2020 with complaints of  Worsening redness in R fith toe. Seen in ED 2 weeks earlier for same and sent out with Augmentin. Initially improved but for 48 hrs increasing redness and swelling. Skin on 5th toe came off. X-ray then with diffuse soft tissue swelling but no wound or signs bony changed. CRP 3.5. On arrival, she is afebrile. Mild tachycardia with lower BP, 108/68. WBC normal without left shift. Na 129 with , normal CRT and transaminases. Lactic acid 3.6.  14.6 CRP. No earlier culture data. Blood cx collected. Started on Vanc and CTX. Patient has no other orthopedic concerns.       Past Medical History Includes:   Past Medical History:   Diagnosis Date    Diabetic peripheral neuropathy associated with type 2 diabetes mellitus     Nonproliferative diabetic retinopathy     Type 2 diabetes mellitus, uncontrolled    ,   Past Surgical History:   Procedure Laterality Date    HX  SECTION   and     HX OTHER SURGICAL      cyst removed from neck     Family History:   Family History   Problem Relation Age of Onset    Diabetes Maternal Grandfather     Cancer Maternal Grandfather         lung (smoker)    Breast Cancer Maternal Grandmother     Crohn's Disease Paternal Grandmother     Cancer Paternal Grandfather         lung (smoker)      Social History:   Social History     Tobacco Use    Smoking status: Never Smoker    Smokeless tobacco: Never Used   Substance Use Topics    Alcohol use: No     Alcohol/week: 0.0 standard drinks       ALLERGIES: No Known Allergies     Patient Medications Current Facility-Administered Medications   Medication Dose Route Frequency    [START ON 3/31/2020] VANCOMYCIN INFORMATION NOTE   Other ONCE    insulin glargine (LANTUS) injection 95 Units  95 Units SubCUTAneous QHS    empagliflozin (JARDIANCE) tablet 25 mg (Patient Supplied)  25 mg Oral DAILY    gabapentin (NEURONTIN) capsule 300 mg  300 mg Oral BID    atorvastatin (LIPITOR) tablet 20 mg  20 mg Oral QHS    insulin lispro (HUMALOG) injection   SubCUTAneous AC&HS    escitalopram oxalate (LEXAPRO) tablet 10 mg  10 mg Oral DAILY    cefTRIAXone (ROCEPHIN) 2 g in 0.9% sodium chloride (MBP/ADV) 50 mL  2 g IntraVENous Q24H    sodium chloride (NS) flush 5-40 mL  5-40 mL IntraVENous Q8H    sodium chloride (NS) flush 5-40 mL  5-40 mL IntraVENous PRN    acetaminophen (TYLENOL) tablet 650 mg  650 mg Oral Q4H PRN    ondansetron (ZOFRAN) injection 4 mg  4 mg IntraVENous Q6H PRN    heparin (porcine) injection 5,000 Units  5,000 Units SubCUTAneous Q12H    0.9% sodium chloride infusion  50 mL/hr IntraVENous CONTINUOUS    vancomycin (VANCOCIN) 1500 mg in  ml infusion  1,500 mg IntraVENous Q12H    alcohol 62% (NOZIN) nasal  1 Ampule  1 Ampule Topical Q12H         Review of Systems:  Pertinent items are noted in HPI. Physical Exam:      General: NAD, Alert, Oriented x 3   Mental Status: Appropriate   Psych: Normal Affect, Normal Mood    HEENT: Normal Cephalic/Atraumatic, PERRL   Lungs: Respirations even and unlabored, Breath Sounds were clear, no respiratory distress   Heart: Regular Rate and Rhythm   Vascular: Distal pulses intact  Skin: right 5th toe dusking but with warmth. Redness of the midfoot   Musculoskeletal: exam of both lower extremities reveal normal painless ROM of the right ankle. Numbness of both feet. Lymphatic: No lympahdenopathy, No distal edema   Neuro:  No central deficits   Abdomen: Soft, Non tender, No distension      VITALS:   Patient Vitals for the past 8 hrs:   BP Temp Pulse Resp SpO2   20 0717 118/75 98.5 °F (36.9 °C) 90 16 97 %   20 0352 116/79 98.8 °F (37.1 °C) 90 18 98 %    , Temp (24hrs), Av.6 °F (37 °C), Min:97.8 °F (36.6 °C), Max:99 °F (37.2 °C)         X-ray: reviewed on EMR    Diagnosis   Patient Active Problem List   Diagnosis Code    Cellulitis L03.90    Neuropathy G62.9    Uncontrolled type 2 diabetes mellitus E11.65, Z79.4    Nonproliferative diabetic retinopathy  E11.3299    Diabetic peripheral neuropathy associated with type 2 diabetes mellitus E11.42    Elevated blood pressure R03.0    Mixed hyperlipidemia E78.2    Encounter for medication management Z79.899    Weakness of both legs R29.898    Paresthesia R20.2    Foot drop, left foot M21.372    Postural imbalance R29.3    Diabetic foot infection (HCC) E11.628, L08.9          Assessment and Plan:   Right 5th toe infection: with obtain MRI right foot w/ contrast. Discussed with Dr. Jae Martinez who will see patient today and discuss surgical options    GIGI Marion  3/30/2020,  9:22 AM    Addendum: Came to see patient. She is off the floor and MRI. We will plan to review MRI images once completed. It is probable that she has osteomyelitis of at least the fifth toe which appears to be nonviable as well as the distal fifth metatarsal.  Will probably plan for ray amputation of the fifth ray. At this point surgery would be planned for either tomorrow afternoon or Wednesday morning.

## 2020-03-30 NOTE — PROGRESS NOTES
MRI of the right foot has been reviewed showing osteo of the 5th toe and probable osteo of the distal 5th metatarsal.    I have discussed the findings with her today and recommended right 5th ray amputation tomorrow. We discussed the risks of nonhealing and risk of higher level amputation. She understands and will consider this treatment plan. She states that she will discussed this with her family and then decide. I have told her that the toe is nonviable and will not heal with antibiotics and wound care. She is tentatively booked for surgery tomorrow afternoon.

## 2020-03-31 ENCOUNTER — ANESTHESIA (OUTPATIENT)
Dept: SURGERY | Age: 45
DRG: 617 | End: 2020-03-31
Payer: COMMERCIAL

## 2020-03-31 PROBLEM — M86.9 OSTEOMYELITIS (HCC): Status: ACTIVE | Noted: 2020-03-31

## 2020-03-31 LAB
ANION GAP SERPL CALC-SCNC: 5 MMOL/L (ref 7–16)
BUN SERPL-MCNC: 4 MG/DL (ref 6–23)
CALCIUM SERPL-MCNC: 8.5 MG/DL (ref 8.3–10.4)
CHLORIDE SERPL-SCNC: 103 MMOL/L (ref 98–107)
CO2 SERPL-SCNC: 29 MMOL/L (ref 21–32)
CREAT SERPL-MCNC: 0.44 MG/DL (ref 0.6–1)
ERYTHROCYTE [DISTWIDTH] IN BLOOD BY AUTOMATED COUNT: 13.8 % (ref 11.9–14.6)
GLUCOSE BLD STRIP.AUTO-MCNC: 164 MG/DL (ref 65–100)
GLUCOSE BLD STRIP.AUTO-MCNC: 168 MG/DL (ref 65–100)
GLUCOSE BLD STRIP.AUTO-MCNC: 235 MG/DL (ref 65–100)
GLUCOSE BLD STRIP.AUTO-MCNC: 242 MG/DL (ref 65–100)
GLUCOSE BLD STRIP.AUTO-MCNC: 280 MG/DL (ref 65–100)
GLUCOSE SERPL-MCNC: 248 MG/DL (ref 65–100)
HCG UR QL: NEGATIVE
HCT VFR BLD AUTO: 35.8 % (ref 35.8–46.3)
HGB BLD-MCNC: 11.8 G/DL (ref 11.7–15.4)
MCH RBC QN AUTO: 29.3 PG (ref 26.1–32.9)
MCHC RBC AUTO-ENTMCNC: 33 G/DL (ref 31.4–35)
MCV RBC AUTO: 88.8 FL (ref 79.6–97.8)
NRBC # BLD: 0.04 K/UL (ref 0–0.2)
PLATELET # BLD AUTO: 263 K/UL (ref 150–450)
PMV BLD AUTO: 9 FL (ref 9.4–12.3)
POTASSIUM SERPL-SCNC: 3.6 MMOL/L (ref 3.5–5.1)
RBC # BLD AUTO: 4.03 M/UL (ref 4.05–5.2)
SODIUM SERPL-SCNC: 137 MMOL/L (ref 136–145)
VANCOMYCIN TROUGH SERPL-MCNC: 8.5 UG/ML (ref 5–20)
WBC # BLD AUTO: 4.6 K/UL (ref 4.3–11.1)

## 2020-03-31 PROCEDURE — 81025 URINE PREGNANCY TEST: CPT

## 2020-03-31 PROCEDURE — 74011250636 HC RX REV CODE- 250/636: Performed by: INTERNAL MEDICINE

## 2020-03-31 PROCEDURE — 76010000160 HC OR TIME 0.5 TO 1 HR INTENSV-TIER 1: Performed by: ORTHOPAEDIC SURGERY

## 2020-03-31 PROCEDURE — 36415 COLL VENOUS BLD VENIPUNCTURE: CPT

## 2020-03-31 PROCEDURE — 85027 COMPLETE CBC AUTOMATED: CPT

## 2020-03-31 PROCEDURE — 74011250636 HC RX REV CODE- 250/636: Performed by: ORTHOPAEDIC SURGERY

## 2020-03-31 PROCEDURE — 74011250637 HC RX REV CODE- 250/637: Performed by: ORTHOPAEDIC SURGERY

## 2020-03-31 PROCEDURE — 74011250636 HC RX REV CODE- 250/636: Performed by: NURSE ANESTHETIST, CERTIFIED REGISTERED

## 2020-03-31 PROCEDURE — 77030002916 HC SUT ETHLN J&J -A: Performed by: ORTHOPAEDIC SURGERY

## 2020-03-31 PROCEDURE — 74011636637 HC RX REV CODE- 636/637: Performed by: ORTHOPAEDIC SURGERY

## 2020-03-31 PROCEDURE — 74011250636 HC RX REV CODE- 250/636: Performed by: ANESTHESIOLOGY

## 2020-03-31 PROCEDURE — 80048 BASIC METABOLIC PNL TOTAL CA: CPT

## 2020-03-31 PROCEDURE — 76060000032 HC ANESTHESIA 0.5 TO 1 HR: Performed by: ORTHOPAEDIC SURGERY

## 2020-03-31 PROCEDURE — 76210000006 HC OR PH I REC 0.5 TO 1 HR: Performed by: ORTHOPAEDIC SURGERY

## 2020-03-31 PROCEDURE — 0Y6M0Z8 DETACHMENT AT RIGHT FOOT, COMPLETE 5TH RAY, OPEN APPROACH: ICD-10-PCS | Performed by: ORTHOPAEDIC SURGERY

## 2020-03-31 PROCEDURE — 74011250637 HC RX REV CODE- 250/637: Performed by: INTERNAL MEDICINE

## 2020-03-31 PROCEDURE — 74011000258 HC RX REV CODE- 258: Performed by: INTERNAL MEDICINE

## 2020-03-31 PROCEDURE — 74011636637 HC RX REV CODE- 636/637: Performed by: FAMILY MEDICINE

## 2020-03-31 PROCEDURE — 80202 ASSAY OF VANCOMYCIN: CPT

## 2020-03-31 PROCEDURE — 82962 GLUCOSE BLOOD TEST: CPT

## 2020-03-31 PROCEDURE — 65270000029 HC RM PRIVATE

## 2020-03-31 PROCEDURE — 77030002933 HC SUT MCRYL J&J -A: Performed by: ORTHOPAEDIC SURGERY

## 2020-03-31 PROCEDURE — 74011250637 HC RX REV CODE- 250/637: Performed by: FAMILY MEDICINE

## 2020-03-31 PROCEDURE — 74011000250 HC RX REV CODE- 250: Performed by: ORTHOPAEDIC SURGERY

## 2020-03-31 RX ORDER — MIDAZOLAM HYDROCHLORIDE 1 MG/ML
INJECTION, SOLUTION INTRAMUSCULAR; INTRAVENOUS AS NEEDED
Status: DISCONTINUED | OUTPATIENT
Start: 2020-03-31 | End: 2020-03-31 | Stop reason: HOSPADM

## 2020-03-31 RX ORDER — SODIUM CHLORIDE 0.9 % (FLUSH) 0.9 %
5-40 SYRINGE (ML) INJECTION AS NEEDED
Status: DISCONTINUED | OUTPATIENT
Start: 2020-03-31 | End: 2020-03-31 | Stop reason: HOSPADM

## 2020-03-31 RX ORDER — PROPOFOL 10 MG/ML
INJECTION, EMULSION INTRAVENOUS
Status: DISCONTINUED | OUTPATIENT
Start: 2020-03-31 | End: 2020-03-31 | Stop reason: HOSPADM

## 2020-03-31 RX ORDER — MIDAZOLAM HYDROCHLORIDE 1 MG/ML
2 INJECTION, SOLUTION INTRAMUSCULAR; INTRAVENOUS
Status: COMPLETED | OUTPATIENT
Start: 2020-03-31 | End: 2020-03-31

## 2020-03-31 RX ORDER — OXYCODONE HYDROCHLORIDE 5 MG/1
5 TABLET ORAL
Status: DISCONTINUED | OUTPATIENT
Start: 2020-03-31 | End: 2020-04-01 | Stop reason: HOSPADM

## 2020-03-31 RX ORDER — NALOXONE HYDROCHLORIDE 0.4 MG/ML
0.2 INJECTION, SOLUTION INTRAMUSCULAR; INTRAVENOUS; SUBCUTANEOUS AS NEEDED
Status: DISCONTINUED | OUTPATIENT
Start: 2020-03-31 | End: 2020-03-31 | Stop reason: HOSPADM

## 2020-03-31 RX ORDER — INSULIN GLARGINE 100 [IU]/ML
40 INJECTION, SOLUTION SUBCUTANEOUS
Status: DISCONTINUED | OUTPATIENT
Start: 2020-03-31 | End: 2020-04-01 | Stop reason: HOSPADM

## 2020-03-31 RX ORDER — BUPIVACAINE HYDROCHLORIDE 5 MG/ML
INJECTION, SOLUTION EPIDURAL; INTRACAUDAL AS NEEDED
Status: DISCONTINUED | OUTPATIENT
Start: 2020-03-31 | End: 2020-03-31 | Stop reason: HOSPADM

## 2020-03-31 RX ORDER — OXYCODONE AND ACETAMINOPHEN 5; 325 MG/1; MG/1
1 TABLET ORAL AS NEEDED
Status: DISCONTINUED | OUTPATIENT
Start: 2020-03-31 | End: 2020-03-31 | Stop reason: HOSPADM

## 2020-03-31 RX ORDER — SODIUM CHLORIDE, SODIUM LACTATE, POTASSIUM CHLORIDE, CALCIUM CHLORIDE 600; 310; 30; 20 MG/100ML; MG/100ML; MG/100ML; MG/100ML
75 INJECTION, SOLUTION INTRAVENOUS CONTINUOUS
Status: DISCONTINUED | OUTPATIENT
Start: 2020-03-31 | End: 2020-03-31 | Stop reason: HOSPADM

## 2020-03-31 RX ORDER — LIDOCAINE HYDROCHLORIDE 10 MG/ML
0.1 INJECTION INFILTRATION; PERINEURAL AS NEEDED
Status: DISCONTINUED | OUTPATIENT
Start: 2020-03-31 | End: 2020-04-01 | Stop reason: HOSPADM

## 2020-03-31 RX ORDER — VANCOMYCIN 1.75 GRAM/500 ML IN 0.9 % SODIUM CHLORIDE INTRAVENOUS
1750 EVERY 12 HOURS
Status: DISCONTINUED | OUTPATIENT
Start: 2020-03-31 | End: 2020-04-01 | Stop reason: HOSPADM

## 2020-03-31 RX ORDER — FENTANYL CITRATE 50 UG/ML
INJECTION, SOLUTION INTRAMUSCULAR; INTRAVENOUS AS NEEDED
Status: DISCONTINUED | OUTPATIENT
Start: 2020-03-31 | End: 2020-03-31 | Stop reason: HOSPADM

## 2020-03-31 RX ORDER — SODIUM CHLORIDE 0.9 % (FLUSH) 0.9 %
5-40 SYRINGE (ML) INJECTION EVERY 8 HOURS
Status: DISCONTINUED | OUTPATIENT
Start: 2020-03-31 | End: 2020-03-31 | Stop reason: HOSPADM

## 2020-03-31 RX ORDER — HYDROMORPHONE HYDROCHLORIDE 2 MG/ML
0.5 INJECTION, SOLUTION INTRAMUSCULAR; INTRAVENOUS; SUBCUTANEOUS
Status: DISCONTINUED | OUTPATIENT
Start: 2020-03-31 | End: 2020-03-31 | Stop reason: HOSPADM

## 2020-03-31 RX ADMIN — FENTANYL CITRATE 25 MCG: 50 INJECTION INTRAMUSCULAR; INTRAVENOUS at 14:25

## 2020-03-31 RX ADMIN — MIDAZOLAM 1 MG: 1 INJECTION INTRAMUSCULAR; INTRAVENOUS at 14:23

## 2020-03-31 RX ADMIN — MIDAZOLAM 1 MG: 1 INJECTION INTRAMUSCULAR; INTRAVENOUS at 14:21

## 2020-03-31 RX ADMIN — OXYCODONE HYDROCHLORIDE 5 MG: 5 TABLET ORAL at 20:26

## 2020-03-31 RX ADMIN — Medication 1 AMPULE: at 21:44

## 2020-03-31 RX ADMIN — ACETAMINOPHEN 650 MG: 325 TABLET, FILM COATED ORAL at 09:01

## 2020-03-31 RX ADMIN — MIDAZOLAM 2 MG: 1 INJECTION INTRAMUSCULAR; INTRAVENOUS at 14:02

## 2020-03-31 RX ADMIN — Medication 10 ML: at 22:00

## 2020-03-31 RX ADMIN — INSULIN LISPRO 6 UNITS: 100 INJECTION, SOLUTION INTRAVENOUS; SUBCUTANEOUS at 21:48

## 2020-03-31 RX ADMIN — INSULIN LISPRO 3 UNITS: 100 INJECTION, SOLUTION INTRAVENOUS; SUBCUTANEOUS at 17:03

## 2020-03-31 RX ADMIN — CEFTRIAXONE SODIUM 2 G: 2 INJECTION, POWDER, FOR SOLUTION INTRAMUSCULAR; INTRAVENOUS at 12:51

## 2020-03-31 RX ADMIN — SODIUM CHLORIDE, SODIUM LACTATE, POTASSIUM CHLORIDE, AND CALCIUM CHLORIDE: 600; 310; 30; 20 INJECTION, SOLUTION INTRAVENOUS at 14:14

## 2020-03-31 RX ADMIN — SODIUM CHLORIDE 50 ML/HR: 900 INJECTION, SOLUTION INTRAVENOUS at 08:48

## 2020-03-31 RX ADMIN — INSULIN LISPRO 6 UNITS: 100 INJECTION, SOLUTION INTRAVENOUS; SUBCUTANEOUS at 11:31

## 2020-03-31 RX ADMIN — ATORVASTATIN CALCIUM 20 MG: 10 TABLET, FILM COATED ORAL at 21:44

## 2020-03-31 RX ADMIN — Medication 1 AMPULE: at 08:50

## 2020-03-31 RX ADMIN — INSULIN LISPRO 9 UNITS: 100 INJECTION, SOLUTION INTRAVENOUS; SUBCUTANEOUS at 08:51

## 2020-03-31 RX ADMIN — HEPARIN SODIUM 5000 UNITS: 5000 INJECTION INTRAVENOUS; SUBCUTANEOUS at 21:45

## 2020-03-31 RX ADMIN — SODIUM CHLORIDE 50 ML/HR: 900 INJECTION, SOLUTION INTRAVENOUS at 15:59

## 2020-03-31 RX ADMIN — INSULIN GLARGINE 40 UNITS: 100 INJECTION, SOLUTION SUBCUTANEOUS at 21:48

## 2020-03-31 RX ADMIN — VANCOMYCIN HYDROCHLORIDE 1750 MG: 10 INJECTION, POWDER, LYOPHILIZED, FOR SOLUTION INTRAVENOUS at 20:05

## 2020-03-31 RX ADMIN — VANCOMYCIN HYDROCHLORIDE 1750 MG: 10 INJECTION, POWDER, LYOPHILIZED, FOR SOLUTION INTRAVENOUS at 08:53

## 2020-03-31 RX ADMIN — Medication 10 ML: at 05:56

## 2020-03-31 RX ADMIN — PROPOFOL 100 MCG/KG/MIN: 10 INJECTION, EMULSION INTRAVENOUS at 14:25

## 2020-03-31 RX ADMIN — ONDANSETRON 4 MG: 2 INJECTION INTRAMUSCULAR; INTRAVENOUS at 20:24

## 2020-03-31 RX ADMIN — CEFTRIAXONE SODIUM 2 G: 2 INJECTION, POWDER, FOR SOLUTION INTRAMUSCULAR; INTRAVENOUS at 14:05

## 2020-03-31 RX ADMIN — ACETAMINOPHEN 650 MG: 325 TABLET, FILM COATED ORAL at 18:57

## 2020-03-31 NOTE — PERIOP NOTES
TRANSFER - OUT REPORT:    Verbal report given to Katerina on Thomas Reid  being transferred to [address] [place] [address] for routine post - op       Report consisted of patients Situation, Background, Assessment and   Recommendations(SBAR). Information from the following report(s) SBAR, Kardex, OR Summary, Procedure Summary and Intake/Output was reviewed with the receiving nurse. Lines:   Peripheral IV 03/29/20 Left;Mid Forearm (Active)   Site Assessment Clean, dry, & intact 3/31/2020  3:09 PM   Phlebitis Assessment 0 3/31/2020  8:50 AM   Infiltration Assessment 0 3/31/2020  8:50 AM   Dressing Status Clean, dry, & intact 3/31/2020  8:50 AM   Dressing Type Tape;Transparent 3/31/2020  8:50 AM   Hub Color/Line Status Patent; Infusing 3/31/2020  8:50 AM   Alcohol Cap Used No 3/29/2020  4:37 PM       Peripheral IV 03/29/20 Right;Distal Forearm (Active)   Site Assessment Clean, dry, & intact 3/31/2020  8:50 AM   Phlebitis Assessment 0 3/31/2020  8:50 AM   Infiltration Assessment 0 3/31/2020  8:50 AM   Dressing Status Clean, dry, & intact 3/31/2020  8:50 AM   Dressing Type Tape;Transparent 3/31/2020  8:50 AM   Hub Color/Line Status Patent;Capped 3/31/2020  8:50 AM   Alcohol Cap Used No 3/29/2020  5:57 PM       Peripheral IV 03/31/20 Left Hand (Active)        Opportunity for questions and clarification was provided. Patient transported with:   Tech    VTE prophylaxis orders have been written for Thomas Reid. Patient and family given floor number and nurses name. Family updated re: pt status after security code verified.

## 2020-03-31 NOTE — PERIOP NOTES
TRANSFER - IN REPORT:    Verbal report received from [facility] on Sabine Keita  being received from Med  surgfor routine progression of care      Report consisted of patients Situation, Background, Assessment and   Recommendations(SBAR). Information from the following report(s) Kardex was reviewed with the receiving nurse. Opportunity for questions and clarification was provided. Assessment completed upon patients arrival to unit and care assumed.

## 2020-03-31 NOTE — PROGRESS NOTES
Hospitalist Progress Note    3/31/2020  Admit Date: 3/29/2020  4:21 PM   NAME: Sabine Keita   :  1975   MRN:  [id number]   Attending: Imer Wilkinson MD  PCP:  Milagro Franklin MD    SUBJECTIVE:   52-year-old  lady with DM type II, diabetic neuropathy, nonproliferative diabetic retinopathy, presented to the ED at [facility] complaining of worsening redness and discoloration of the right fifth toe. The patient was actually seen in the ED 2 weeks ago for the foot infection and was discharged home on Augmentin. Initial improvement, but 2 days ago she noticed some worsening redness affecting the lateral aspect of the right foot and also the right fifth toe. Of note, she had a callus in the area that she probably rubbed against her shoes. Since then, the right foot and right fifth toe have been red, warm and swollen. The skin over the right fifth toe is denuded and is exhibiting some darkish discoloration without being black all over. WBC 7.9, glucose 390, creatinine 0.89, lactic acid 3.6. Right foot x-ray did not show any acute osseous or joint abnormalities. T 97.8 F, HR low 100s in ED. She was started on IV ceftriaxone and vancomycin. Pt with ongoing nausea. Has decided to proceed with surgery, scheduled for this afternoon.       Review of Systems negative with exception of pertinent positives noted above  PHYSICAL EXAM     Patient Vitals for the past 24 hrs:   Temp Pulse Resp BP SpO2   20 0723 98.5 °F (36.9 °C) 86 20 136/81 97 %   20 0417 98.4 °F (36.9 °C) 91 18 148/87 98 %   20 0001 98 °F (36.7 °C) 93 16 131/80 98 %   20 1937 98.3 °F (36.8 °C) 86 18 156/88 99 %   20 1535 97.9 °F (36.6 °C) 87 18 125/75 99 %   20 1230 98.6 °F (37 °C) 85 20 (!) 159/93 100 %     Oxygen Therapy  O2 Sat (%): 97 % (20 0723)  Pulse via Oximetry: 102 beats per minute (20 1857)  O2 Device: Room air (20 0706)    Intake/Output Summary (Last 24 hours) at 3/31/2020 0759  Last data filed at 3/30/2020 1747  Gross per 24 hour   Intake 1490 ml   Output    Net 1490 ml      General:          Well nourished. Alert. CV:                  RRR. No murmur, rub, or gallop. Lungs:             CTAB. No wheezing, rhonchi, or rales. Abdomen:        Soft, nontender, nondistended. Bowel sounds normal.   Extremities:    R foot bandaged  Neurologic:      CN II-XII grossly intact. Diminished sensation in the LE bilaterally. Recent Results (from the past 24 hour(s))   GLUCOSE, POC    Collection Time: 03/30/20 12:31 PM   Result Value Ref Range    Glucose (POC) 256 (H) 65 - 100 mg/dL   GLUCOSE, POC    Collection Time: 03/30/20  4:45 PM   Result Value Ref Range    Glucose (POC) 207 (H) 65 - 100 mg/dL   GLUCOSE, POC    Collection Time: 03/30/20 10:02 PM   Result Value Ref Range    Glucose (POC) 286 (H) 65 - 058 mg/dL   METABOLIC PANEL, BASIC    Collection Time: 03/31/20  5:24 AM   Result Value Ref Range    Sodium 137 136 - 145 mmol/L    Potassium 3.6 3.5 - 5.1 mmol/L    Chloride 103 98 - 107 mmol/L    CO2 29 21 - 32 mmol/L    Anion gap 5 (L) 7 - 16 mmol/L    Glucose 248 (H) 65 - 100 mg/dL    BUN 4 (L) 6 - 23 MG/DL    Creatinine 0.44 (L) 0.6 - 1.0 MG/DL    GFR est AA >60 >60 ml/min/1.73m2    GFR est non-AA >60 >60 ml/min/1.73m2    Calcium 8.5 8.3 - 10.4 MG/DL   CBC W/O DIFF    Collection Time: 03/31/20  5:24 AM   Result Value Ref Range    WBC 4.6 4.3 - 11.1 K/uL    RBC 4.03 (L) 4.05 - 5.2 M/uL    HGB 11.8 11.7 - 15.4 g/dL    HCT 35.8 35.8 - 46.3 %    MCV 88.8 79.6 - 97.8 FL    MCH 29.3 26.1 - 32.9 PG    MCHC 33.0 31.4 - 35.0 g/dL    RDW 13.8 11.9 - 14.6 %    PLATELET 980 765 - 279 K/uL    MPV 9.0 (L) 9.4 - 12.3 FL    ABSOLUTE NRBC 0.04 0.0 - 0.2 K/uL   VANCOMYCIN, TROUGH    Collection Time: 03/31/20  6:29 AM   Result Value Ref Range    Vancomycin,trough 8.5 5 - 20 ug/mL       Micro:   All Micro Results     Procedure Component Value Units Date/Time    CULTURE, BLOOD [[id number]] Collected:  03/29/20 1800    Order Status:  Completed Specimen:  Blood Updated:  03/30/20 0758     Special Requests: --        RIGHT  FOREARM       Culture result: NO GROWTH AFTER 13 HOURS       CULTURE, BLOOD [[id number]] Collected:  03/29/20 1757    Order Status:  Completed Specimen:  Blood Updated:  03/30/20 0758     Special Requests: --        LEFT  FOREARM       Culture result: NO GROWTH AFTER 13 HOURS             Other studies last 24 hours:  Mri Foot Rt W Wo Cont    Result Date: 3/30/2020  IMPRESSION: 1. Osteomyelitis involving the fifth proximal and distal phalanges. There is an ulceration with subcutaneous cellulitis. No evidence of abscess. 2. Midfoot degenerative change. All imaging, labs reviewed. ASSESSMENT      Hospital Problems as of 3/31/2020 Date Reviewed: 11/5/2019          Codes Class Noted - Resolved POA    * (Principal) Diabetic foot infection ([facility] [address] ICD-10-CM: E11.628, L08.9  ICD-9-CM: 250.80, 686.9  3/29/2020 - Present Unknown        Uncontrolled type 2 diabetes mellitus ICD-10-CM: E11.65, Z79.4  ICD-9-CM: 250.02, V58.67  Unknown - Present Yes        Nonproliferative diabetic retinopathy  ICD-10-CM: E11.3299  ICD-9-CM: 250.50, 362.03  Unknown - Present Yes        Cellulitis ICD-10-CM: L03.90  ICD-9-CM: 682.9  3/18/2016 - Present Yes            Diabetic foot infection  Osteomyelitis  MRI with osteomyelitis of 5th digit. - cont rocephin, Vanc  - ID following  - ortho planning for 5th ray amputation today  - f/u blood cxs    Mild hyponatremia  Some component of pseudohyponatremia on admit. Resolved with IVFs.     Uncontrolled DM2  Diabetic retinopathy and neuropathy  Follows with endocrine but very difficult time maintaining control per pt.  - increased Lantus to 95 units - will likely need to switch to BID dosing  - diabetic educator consult  - SSI  - A1c 10.9    HLD  - cont statin    Proph - heparin SQ  Dispo - likely 2-3 days      Signed By: Ramakrishna Shine MD     March 31, 2020

## 2020-03-31 NOTE — OP NOTES
FULL OP NOTE    PATIENT NAME: Kari Hough  MRN: [id number]    DATE OF SURGERY: 3/31/2020    PREOPERATIVE DIAGNOSIS: Osteomyelitis of right foot, unspecified type ([address] [M86.9]      POSTOPERATIVE DIAGNOSIS: Osteomyelitis of right foot, unspecified type ([address] [M86.9]  . PROCEDURE: Right fifth ray amputation of the foot, [address]    SURGEON: Loren Rivera MD    HARDWARE: * No implants in log *   INDICATIONS: This patient is a 60-year-old white female admitted with a history of right fifth toe osteomyelitis and probable septic fifth MTP joint based on her MRI. She presents today for amputation of the right fifth toe as she has any reconstructable toe which is nonviable with significant osteomyelitis. Risks and benefits the procedure including but not limited to risk of anesthetic complications including myocardial infarction stroke and death well surgical complications including damage nerves blood vessels risk of infection risk of incomplete pain relief and need for additional surgery been discussed with the patient. She understands risks and wishes proceed with surgery at this time. PROCEDURE IN DETAIL: A time out was done to confirm the operating procedure, surgeon, patient and site. Once confirmed by the team, procedure was started. The patient was anesthetized using 0.5% marcaine. A racquet shaped incision was made at base of the fifth metatarsal.  There is significant purulence and obvious osteomyelitis located in all 3 bones of the toe as well as a septic fifth MTP joint with purulent drainage. Patient was carried proximally and the distal fifth metatarsal was resected using oscillating saw in a beveled fashion at that time back to a clean margin of bone. Wound was irrigated with sterile saline and closed loosely using Monocryl and nylon sutures. Sterile dressing was then applied. Anesthesia was discontinued. The patient was transferred to recovery bed in satisfactory condition.   She appeared to tolerate the procedure well. There were no apparent surgical anesthetic complications. All needle and sponge counts were correct. TOURNIQUET TIME: Approx 17 minutes. SPECIMENS: none    ESTIMATED BLOOD LOSS: min mL.

## 2020-03-31 NOTE — BRIEF OP NOTE
Brief Postoperative Note    Patient: Elton Holland  YOB: 1975  MRN: [id number]    Date of Procedure: 3/31/2020     Pre-Op Diagnosis: Osteomyelitis of right foot, unspecified type ([facility] [address] [M86.9]    Post-Op Diagnosis: Same as preoperative diagnosis.       Procedure(s):  RIGHT FOOT 5TH RAY AMPUTATION      Surgeon(s):  Melvin Garland MD    Surgical Assistant: None    Anesthesia: General     Estimated Blood Loss (mL): Minimal    Complications: None    Specimens: * No specimens in log *     Implants: * No implants in log *    Drains: * No LDAs found *    Findings: osteo of toe and septic 5th mtp joint    Electronically Signed by Kaity Osorio MD on 3/31/2020 at 3:11 PM

## 2020-03-31 NOTE — PROGRESS NOTES
Shift assessment complete. Pt appears agitated, withdrawn. Oriented x4. Respirations even and unlabored. Lung sounds clear on room air. HR regular. Abdomen obese, soft, with active bowel sounds heard in all four quadrants. Skin intact, wound noted to the right foot, 5th toe. Dressing to wound c-d-I. IV patent and infusing. Pt complains of a headache, will medicate. All other needs met at this time. Safety measures in place, call light within reach. Will continue to monitor.

## 2020-03-31 NOTE — DIABETES MGMT
Patient admitted with osteomyelitis planned for right 5th ray amputation this afternoon. Admitting blood glucose 390. Blood glucose ranged 207-312 yesterday with patient receiving Humalog 27 units. Blood glucose this morning was 280. Most recent FSBS 242. Spoke with primary RN patient planned for surgery early this afternoon. Noted per chart review patient was tearful last night and appears agitated, withdrawn today. Not appropriate for education at this time. Plan to follow up with patient post op for diabetes education tomorrow as patient condition allows. Requested that primary RN give patient Diabetes Self Management A Patient Teaching guide to review at her convenience. Reviewed patient current regimen: Lantus 95 units nightly and Humalog insulin resistant sliding scale. Updated provider via [employer]. Patient has not received any basal insulin since admission. Would recommend decreasing patient night basal insulin dose to 40 units and evaluating patient need for split basal dosing in the AM. Will continue to follow along.

## 2020-03-31 NOTE — PROGRESS NOTES
Vanc Consult    MD ordering: Ifrah RICHARDSON following? no  Indication: ssti/ osteomyelitis  DOT:  7  days  Goal level(s): 15-20mcg/ml    Ht Readings from Last 1 Encounters:   20 5' 8\" (1.727 m)      Wt Readings from Last 1 Encounters:   20 85.3 kg (188 lb)         Temp (24hrs), Av.4 °F (36.9 °C), Min:97.8 °F (36.6 °C), Max:98.9 °F (37.2 °C)    Dosing weight: 85.3 kg  38 y.o. Date:  Dose/Freq Admin Times Level/Time:   3/29 1750mg x1 1725    3/30 1500mg q12h 0746  2031    3/31 1750 mg q12h (08)  (20) Trough at 0629 was 8.5                 Recent Labs     20  1941 20  1643   BUN  --  7   CREA  --  0.89   WBC  --  7.9   LAC 2.6* 3.6*     Estimated Creatinine Clearance: 93.3 mL/min (based on SCr of 0.89 mg/dL). Lab Results   Component Value Date/Time    Vancomycin,trough 4.6 (L) 2016 12:02 PM       Day 3  Assessment and Plan: Will change Vancomycin to 1750 mg IV every 12 hours. Will continue to monitor closely.     [address] [facility], PharmD, BCPS

## 2020-03-31 NOTE — ANESTHESIA POSTPROCEDURE EVALUATION
Procedure(s):  RIGHT FOOT 5TH RAY AMPUTATION  .     total IV anesthesia    Anesthesia Post Evaluation      Multimodal analgesia: multimodal analgesia used between 6 hours prior to anesthesia start to PACU discharge  Patient location during evaluation: PACU  Patient participation: complete - patient participated  Level of consciousness: awake and alert  Pain management: adequate  Airway patency: patent  Anesthetic complications: no  Cardiovascular status: acceptable and hemodynamically stable  Respiratory status: acceptable  Hydration status: acceptable        Vitals Value Taken Time   /75 3/31/2020  3:22 PM   Temp 36.7 °C (98 °F) 3/31/2020  3:02 PM   Pulse 74 3/31/2020  3:27 PM   Resp 16 3/31/2020  3:27 PM   SpO2 95 % 3/31/2020  3:27 PM

## 2020-03-31 NOTE — PROGRESS NOTES
Pt complains of a headache. PRN Tylenol 650 mg given PO per MD order. Safety measures in place, call light within reach. Will continue to monitor.

## 2020-03-31 NOTE — PROGRESS NOTES
Chart reviewed: patient not seen. MRI shows osteomyelitis involving the fifth proximal and distal phalanges. Awaiting Ortho recommendations but appears surgical intervention likely. Afebrile. Blood cx remain negative. WBC normal. BS remain elevated. Continue current ABX: Vanc and CTX.

## 2020-03-31 NOTE — PROGRESS NOTES
Care Management Interventions  PCP Verified by CM: Yes  Palliative Care Criteria Met (RRAT>21 & CHF Dx)?: (Risk 11% Dx Diabetic foot infection )  Mode of Transport at Discharge: Other (see comment)  Transition of Care Consult (CM Consult): Other  Discharge Durable Medical Equipment: No(owns lift chair, wheelchair, power chair wheelchair)  Physical Therapy Consult: No  Occupational Therapy Consult: No  Speech Therapy Consult: No  Current Support Network: Lives with Spouse  Confirm Follow Up Transport: Family  The Patient and/or Patient Representative was Provided with a Choice of Provider and Agrees with the Discharge Plan?: Yes  Freedom of Choice List was Provided with Basic Dialogue that Supports the Patient's Individualized Plan of Care/Goals, Treatment Preferences and Shares the Quality Data Associated with the Providers?: Yes  Discharge Location  Discharge Placement: Home  Patient to have amputation of 5th toe this afternoon. Prior to admission she lives with her  [facility] [phone] in 2 story home with 1 step to entrance. DME includes lift chair, wheelchair, glucometer shower chair and power wheelchair. She has transportation and [employer]. She obtains her medications at [facility] [phone]. Current d/c plan is home with spouse pending clinical progress. Following ID recommendations and surgical procedure. CM following.

## 2020-03-31 NOTE — ANESTHESIA PREPROCEDURE EVALUATION
Relevant Problems   No relevant active problems       Anesthetic History   No history of anesthetic complications            Review of Systems / Medical History  Patient summary reviewed and pertinent labs reviewed    Pulmonary  Within defined limits                 Neuro/Psych   Within defined limits           Cardiovascular                  Exercise tolerance: >4 METS     GI/Hepatic/Renal  Within defined limits              Endo/Other    Diabetes: poorly controlled, type 2, using insulin         Other Findings              Physical Exam    Airway  Mallampati: III  TM Distance: 4 - 6 cm  Neck ROM: normal range of motion   Mouth opening: Normal     Cardiovascular    Rhythm: regular  Rate: normal         Dental         Pulmonary  Breath sounds clear to auscultation               Abdominal         Other Findings            Anesthetic Plan    ASA: 3  Anesthesia type: total IV anesthesia          Induction: Intravenous  Anesthetic plan and risks discussed with: Patient

## 2020-03-31 NOTE — PROGRESS NOTES
TRANSFER - OUT REPORT:    Verbal report given to Lalitha Haley RN (name) on Lydia Tapia  being transferred to Preop. (unit) for ordered procedure       Report consisted of patients Situation, Background, Assessment and   Recommendations(SBAR). Information from the following report(s) SBAR, Kardex, Intake/Output, MAR and Recent Results was reviewed with the receiving nurse. Lines:   Peripheral IV 03/29/20 Left;Mid Forearm (Active)   Site Assessment Clean, dry, & intact 3/31/2020  8:50 AM   Phlebitis Assessment 0 3/31/2020  8:50 AM   Infiltration Assessment 0 3/31/2020  8:50 AM   Dressing Status Clean, dry, & intact 3/31/2020  8:50 AM   Dressing Type Tape;Transparent 3/31/2020  8:50 AM   Hub Color/Line Status Patent; Infusing 3/31/2020  8:50 AM   Alcohol Cap Used No 3/29/2020  4:37 PM       Peripheral IV 03/29/20 Right;Distal Forearm (Active)   Site Assessment Clean, dry, & intact 3/31/2020  8:50 AM   Phlebitis Assessment 0 3/31/2020  8:50 AM   Infiltration Assessment 0 3/31/2020  8:50 AM   Dressing Status Clean, dry, & intact 3/31/2020  8:50 AM   Dressing Type Tape;Transparent 3/31/2020  8:50 AM   Hub Color/Line Status Patent;Capped 3/31/2020  8:50 AM   Alcohol Cap Used No 3/29/2020  5:57 PM        Opportunity for questions and clarification was provided.       Patient transported with:

## 2020-03-31 NOTE — PROGRESS NOTES
Pt returns to the floor from the PACU in stable condition. VSS. Pt alert and oriented x4. Pt denies pain and nausea. Surgical site noted to the right foot, surgical dressing c-d-I. IV patent and infusing. All needs met at this time. Safety measures in place, call light within reach. Will continue to monitor.

## 2020-03-31 NOTE — PROGRESS NOTES
Shift assessment complete. A&O x 4. Respirations even and unlabored. No signs of distress. No complaints at this time. Right foot dsg c/d/i. Pt voices understanding of being NPO at midnight. Call light within reach. Encourage pt to call for assistance. Pt verbalizes understanding. See Doc Flowsheet for assessment details.

## 2020-04-01 VITALS
BODY MASS INDEX: 28.49 KG/M2 | WEIGHT: 188 LBS | HEIGHT: 68 IN | RESPIRATION RATE: 16 BRPM | HEART RATE: 89 BPM | DIASTOLIC BLOOD PRESSURE: 95 MMHG | SYSTOLIC BLOOD PRESSURE: 145 MMHG | TEMPERATURE: 98.5 F | OXYGEN SATURATION: 99 %

## 2020-04-01 LAB
ANION GAP SERPL CALC-SCNC: 4 MMOL/L (ref 7–16)
BUN SERPL-MCNC: 6 MG/DL (ref 6–23)
CALCIUM SERPL-MCNC: 8.3 MG/DL (ref 8.3–10.4)
CHLORIDE SERPL-SCNC: 107 MMOL/L (ref 98–107)
CO2 SERPL-SCNC: 28 MMOL/L (ref 21–32)
CREAT SERPL-MCNC: 0.44 MG/DL (ref 0.6–1)
ERYTHROCYTE [DISTWIDTH] IN BLOOD BY AUTOMATED COUNT: 13.6 % (ref 11.9–14.6)
GLUCOSE BLD STRIP.AUTO-MCNC: 189 MG/DL (ref 65–100)
GLUCOSE SERPL-MCNC: 182 MG/DL (ref 65–100)
HCT VFR BLD AUTO: 33.5 % (ref 35.8–46.3)
HGB BLD-MCNC: 11.2 G/DL (ref 11.7–15.4)
MCH RBC QN AUTO: 29.6 PG (ref 26.1–32.9)
MCHC RBC AUTO-ENTMCNC: 33.4 G/DL (ref 31.4–35)
MCV RBC AUTO: 88.4 FL (ref 79.6–97.8)
NRBC # BLD: 0 K/UL (ref 0–0.2)
PLATELET # BLD AUTO: 242 K/UL (ref 150–450)
PMV BLD AUTO: 8.3 FL (ref 9.4–12.3)
POTASSIUM SERPL-SCNC: 3.1 MMOL/L (ref 3.5–5.1)
RBC # BLD AUTO: 3.79 M/UL (ref 4.05–5.2)
SODIUM SERPL-SCNC: 139 MMOL/L (ref 136–145)
WBC # BLD AUTO: 5.3 K/UL (ref 4.3–11.1)

## 2020-04-01 PROCEDURE — 80048 BASIC METABOLIC PNL TOTAL CA: CPT

## 2020-04-01 PROCEDURE — 74011636637 HC RX REV CODE- 636/637: Performed by: ORTHOPAEDIC SURGERY

## 2020-04-01 PROCEDURE — 97161 PT EVAL LOW COMPLEX 20 MIN: CPT

## 2020-04-01 PROCEDURE — 85027 COMPLETE CBC AUTOMATED: CPT

## 2020-04-01 PROCEDURE — 74011250637 HC RX REV CODE- 250/637: Performed by: ORTHOPAEDIC SURGERY

## 2020-04-01 PROCEDURE — 36415 COLL VENOUS BLD VENIPUNCTURE: CPT

## 2020-04-01 PROCEDURE — 82962 GLUCOSE BLOOD TEST: CPT

## 2020-04-01 PROCEDURE — 74011250636 HC RX REV CODE- 250/636: Performed by: ORTHOPAEDIC SURGERY

## 2020-04-01 PROCEDURE — 74011250637 HC RX REV CODE- 250/637: Performed by: INTERNAL MEDICINE

## 2020-04-01 PROCEDURE — 74011250637 HC RX REV CODE- 250/637: Performed by: FAMILY MEDICINE

## 2020-04-01 RX ORDER — POTASSIUM CHLORIDE 20 MEQ/1
40 TABLET, EXTENDED RELEASE ORAL 2 TIMES DAILY
Status: DISCONTINUED | OUTPATIENT
Start: 2020-04-01 | End: 2020-04-01 | Stop reason: HOSPADM

## 2020-04-01 RX ORDER — DOXYCYCLINE 100 MG/1
100 CAPSULE ORAL 2 TIMES DAILY
Qty: 28 CAP | Refills: 0 | Status: SHIPPED | OUTPATIENT
Start: 2020-04-01 | End: 2020-04-15

## 2020-04-01 RX ORDER — CEFDINIR 300 MG/1
300 CAPSULE ORAL 2 TIMES DAILY
Qty: 28 CAP | Refills: 0 | Status: SHIPPED | OUTPATIENT
Start: 2020-04-01 | End: 2020-04-15

## 2020-04-01 RX ORDER — METFORMIN HYDROCHLORIDE 750 MG/1
750 TABLET, EXTENDED RELEASE ORAL 2 TIMES DAILY
Qty: 60 TAB | Refills: 2 | Status: SHIPPED | OUTPATIENT
Start: 2020-04-01 | End: 2020-08-18

## 2020-04-01 RX ORDER — POTASSIUM CHLORIDE 20 MEQ/1
20 TABLET, EXTENDED RELEASE ORAL 2 TIMES DAILY
Qty: 6 TAB | Refills: 0 | Status: SHIPPED | OUTPATIENT
Start: 2020-04-01 | End: 2020-05-06

## 2020-04-01 RX ADMIN — INSULIN LISPRO 3 UNITS: 100 INJECTION, SOLUTION INTRAVENOUS; SUBCUTANEOUS at 07:39

## 2020-04-01 RX ADMIN — Medication 10 ML: at 06:13

## 2020-04-01 RX ADMIN — OXYCODONE HYDROCHLORIDE 5 MG: 5 TABLET ORAL at 08:46

## 2020-04-01 RX ADMIN — Medication 1 AMPULE: at 08:38

## 2020-04-01 RX ADMIN — POTASSIUM CHLORIDE 40 MEQ: 1500 TABLET, EXTENDED RELEASE ORAL at 08:37

## 2020-04-01 RX ADMIN — HEPARIN SODIUM 5000 UNITS: 5000 INJECTION INTRAVENOUS; SUBCUTANEOUS at 08:38

## 2020-04-01 NOTE — DISCHARGE INSTRUCTIONS
Patient Education     Nutrition Tips for Diabetes: After Your Visit  Your Care Instructions  A healthy diet is important to manage diabetes. It helps you lose weight (if you need to) and keep it off. It gives you the nutrition and energy your body needs and helps prevent heart disease. But a diet for diabetes does not mean that you have to eat special foods. You can eat what your family eats, including occasional sweets and other favorites. But you do have to pay attention to how often you eat and how much you eat of certain foods. The right plan for you will give you meals that help you keep your blood sugar at healthy levels. Try to eat a variety of foods and to spread carbohydrate throughout the day. Carbohydrate raises blood sugar higher and more quickly than any other nutrient does. Carbohydrate is found in sugar, breads and cereals, fruit, starchy vegetables such as potatoes and corn, and milk and yogurt. You may want to work with a dietitian or diabetes educator to help you plan meals and snacks. A dietitian or diabetes educator also can help you lose weight if that is one of your goals. The following tips can help you enjoy your meals and stay healthy. Follow-up care is a key part of your treatment and safety. Be sure to make and go to all appointments, and call your doctor if you are having problems. Its also a good idea to know your test results and keep a list of the medicines you take. How can you care for yourself at home? · Learn which foods have carbohydrate and how much carbohydrate to eat. A dietitian or diabetes educator can help you learn to keep track of how much carbohydrate you eat. · Spread carbohydrate throughout the day. Eat some carbohydrate at all meals, but do not eat too much at any one time. · Plan meals to include food from all the food groups.  These are the food groups and some example portion sizes:  ¨ Grains: 1 slice of bread (1 ounce), ½ cup of cooked cereal, and 1/3 cup of cooked pasta or rice. These have about 15 grams of carbohydrate in a serving. Choose whole grains such as whole wheat bread or crackers, oatmeal, and brown rice more often than refined grains. ¨ Fruit: 1 small fresh fruit, such as an apple or orange; ½ of a banana; ½ cup of chopped, cooked, or canned fruit; ½ cup of fruit juice; 1 cup of melon or raspberries; and 2 tablespoons of dried fruit. These have about 15 grams of carbohydrate in a serving. ¨ Dairy: 1 cup of nonfat or low-fat milk and 2/3 cup of plain yogurt. These have about 15 grams of carbohydrate in a serving. ¨ Protein foods: Beef, chicken, turkey, fish, eggs, tofu, cheese, cottage cheese, and peanut butter. A serving size of meat is 3 ounces, which is about the size of a deck of cards. Examples of meat substitute serving sizes (equal to 1 ounce of meat) are 1/4 cup of cottage cheese, 1 egg, 1 tablespoon of peanut butter, and ½ cup of tofu. These have very little or no carbohydrate per serving. ¨ Vegetables: Starchy vegetables such as ½ cup of cooked dried beans, peas, potatoes, or corn have about 15 grams of carbohydrate. Nonstarchy vegetables have very little carbohydrate, such as 1 cup of raw leafy vegetables (such as spinach), ½ cup of other vegetables (cooked or chopped), and 3/4 cup of vegetable juice. · Use the plate format to plan meals. It is a good, quick way to make sure that you have a balanced meal. It also helps you spread carbohydrate throughout the day. You divide your plate by types of foods. Put vegetables on half the plate, meat or meat substitutes on one-quarter of the plate, and a grain or starchy vegetable (such as brown rice or a potato) in the final quarter of the plate. To this you can add a small piece of fruit and 1 cup of milk or yogurt, depending on how much carbohydrate you are supposed to eat at a meal.  · Talk to your dietitian or diabetes educator about ways to add limited amounts of sweets into your meal plan.  You can eat these foods now and then, as long as you include the amount of carbohydrate they have in your daily carbohydrate allowance. · If you drink alcohol, limit it to no more than 1 drink a day for women and 2 drinks a day for men. If you are pregnant, no amount of alcohol is known to be safe. · Protein, fat, and fiber do not raise blood sugar as much as carbohydrate does. If you eat a lot of these nutrients in a meal, your blood sugar will rise more slowly than it would otherwise. · Limit saturated fats, such as those from meat and dairy products. Try to replace it with monounsaturated fat, such as olive oil. This is a healthier choice because people who have diabetes are at higher-than-average risk of heart disease. But use a modest amount of olive oil. A tablespoon of olive oil has 14 grams of fat and 120 calories. · Exercise lowers blood sugar. If you take insulin by shots or pump, you can use less than you would if you were not exercising. Keep in mind that timing matters. If you exercise within 1 hour after a meal, your body may need less insulin for that meal than it would if you exercised 3 hours after the meal. Test your blood sugar to find out how exercise affects your need for insulin. · Exercise on most days of the week. Aim for at least 30 minutes. Exercise helps you stay at a healthy weight and helps your body use insulin. Walking is an easy way to get exercise. Gradually increase the amount you walk every day. You also may want to swim, bike, or do other activities. When you eat out  · Learn to estimate the serving sizes of foods that have carbohydrate. If you measure food at home, it will be easier to estimate the amount in a serving of restaurant food. · If the meal you order has too much carbohydrate (such as potatoes, corn, or baked beans), ask to have a low-carbohydrate food instead. Ask for a salad or green vegetables.   · If you use insulin, check your blood sugar before and after eating out to help you plan how much to eat in the future. · If you eat more carbohydrate at a meal than you had planned, take a walk or do other exercise. This will help lower your blood sugar. Where can you learn more? Go to [facility]  Enter O031 in the search box to learn more about \"Nutrition Tips for Diabetes: After Your Visit. \"   © 4465-6966 Healthwise, Incorporated. Care instructions adapted under license by [employer] (which disclaims liability or warranty for this information). This care instruction is for use with your licensed healthcare professional. If you have questions about a medical condition or this instruction, always ask your healthcare professional. [place] [address] any warranty or liability for your use of this information. Content Version: 66.5.866282; Current as of: June 4, 2014                 Patient Education        Learning About Diabetes Food Guidelines  Your Care Instructions    Meal planning is important to manage diabetes. It helps keep your blood sugar at a target level (which you set with your doctor). You don't have to eat special foods. You can eat what your family eats, including sweets once in a while. But you do have to pay attention to how often you eat and how much you eat of certain foods. You may want to work with a dietitian or a certified diabetes educator (CDE) to help you plan meals and snacks. A dietitian or CDE can also help you lose weight if that is one of your goals. What should you know about eating carbs? Managing the amount of carbohydrate (carbs) you eat is an important part of healthy meals when you have diabetes. Carbohydrate is found in many foods. · Learn which foods have carbs. And learn the amounts of carbs in different foods. ? Bread, cereal, pasta, and rice have about 15 grams of carbs in a serving.  A serving is 1 slice of bread (1 ounce), ½ cup of cooked cereal, or 1/3 cup of cooked pasta or rice.  ? Fruits have 15 grams of carbs in a serving. A serving is 1 small fresh fruit, such as an apple or orange; ½ of a banana; ½ cup of cooked or canned fruit; ½ cup of fruit juice; 1 cup of melon or raspberries; or 2 tablespoons of dried fruit. ? Milk and no-sugar-added yogurt have 15 grams of carbs in a serving. A serving is 1 cup of milk or 2/3 cup of no-sugar-added yogurt. ? Starchy vegetables have 15 grams of carbs in a serving. A serving is ½ cup of mashed potatoes or sweet potato; 1 cup winter squash; ½ of a small baked potato; ½ cup of cooked beans; or ½ cup cooked corn or green peas. · Learn how much carbs to eat each day and at each meal. A dietitian or CDE can teach you how to keep track of the amount of carbs you eat. This is called carbohydrate counting. · If you are not sure how to count carbohydrate grams, use the Plate Method to plan meals. It is a good, quick way to make sure that you have a balanced meal. It also helps you spread carbs throughout the day. ? Divide your plate by types of foods. Put non-starchy vegetables on half the plate, meat or other protein food on one-quarter of the plate, and a grain or starchy vegetable in the final quarter of the plate. To this you can add a small piece of fruit and 1 cup of milk or yogurt, depending on how many carbs you are supposed to eat at a meal.  · Try to eat about the same amount of carbs at each meal. Do not \"save up\" your daily allowance of carbs to eat at one meal.  · Proteins have very little or no carbs per serving. Examples of proteins are beef, chicken, turkey, fish, eggs, tofu, cheese, cottage cheese, and peanut butter. A serving size of meat is 3 ounces, which is about the size of a deck of cards. Examples of meat substitute serving sizes (equal to 1 ounce of meat) are 1/4 cup of cottage cheese, 1 egg, 1 tablespoon of peanut butter, and ½ cup of tofu. How can you eat out and still eat healthy?   · Learn to estimate the serving sizes of foods that have carbohydrate. If you measure food at home, it will be easier to estimate the amount in a serving of restaurant food. · If the meal you order has too much carbohydrate (such as potatoes, corn, or baked beans), ask to have a low-carbohydrate food instead. Ask for a salad or green vegetables. · If you use insulin, check your blood sugar before and after eating out to help you plan how much to eat in the future. · If you eat more carbohydrate at a meal than you had planned, take a walk or do other exercise. This will help lower your blood sugar. What else should you know? · Limit saturated fat, such as the fat from meat and dairy products. This is a healthy choice because people who have diabetes are at higher risk of heart disease. So choose lean cuts of meat and nonfat or low-fat dairy products. Use olive or canola oil instead of butter or shortening when cooking. · Don't skip meals. Your blood sugar may drop too low if you skip meals and take insulin or certain medicines for diabetes. · Check with your doctor before you drink alcohol. Alcohol can cause your blood sugar to drop too low. Alcohol can also cause a bad reaction if you take certain diabetes medicines. Follow-up care is a key part of your treatment and safety. Be sure to make and go to all appointments, and call your doctor if you are having problems. It's also a good idea to know your test results and keep a list of the medicines you take. Where can you learn more? Go to http://becca-padmini.info/  Enter I147 in the search box to learn more about \"Learning About Diabetes Food Guidelines. \"  Current as of: December 19, 2019Content Version: 12.4  © 7991-4299 Healthwise, Incorporated. Care instructions adapted under license by [employer] (which disclaims liability or warranty for this information).  If you have questions about a medical condition or this instruction, always ask your healthcare professional. [address] any warranty or liability for your use of this information. Patient Education        Learning About Meal Planning for Diabetes  Why plan your meals? Meal planning can be a key part of managing diabetes. Planning meals and snacks with the right balance of carbohydrate, protein, and fat can help you keep your blood sugar at the target level you set with your doctor. You don't have to eat special foods. You can eat what your family eats, including sweets once in a while. But you do have to pay attention to how often you eat and how much you eat of certain foods. You may want to work with a dietitian or a certified diabetes educator. He or she can give you tips and meal ideas and can answer your questions about meal planning. This health professional can also help you reach a healthy weight if that is one of your goals. What plan is right for you? Your dietitian or diabetes educator may suggest that you start with the plate format or carbohydrate counting. The plate format  The plate format is a simple way to help you manage how you eat. You plan meals by learning how much space each food should take on a plate. Using the plate format helps you spread carbohydrate throughout the day. It can make it easier to keep your blood sugar level within your target range. It also helps you see if you're eating healthy portion sizes. To use the plate format, you put non-starchy vegetables on half your plate. Add meat or meat substitutes on one-quarter of the plate. Put a grain or starchy vegetable (such as brown rice or a potato) on the final quarter of the plate. You can add a small piece of fruit and some low-fat or fat-free milk or yogurt, depending on your carbohydrate goal for each meal.  Here are some tips for using the plate format:  · Make sure that you are not using an oversized plate. A 9-inch plate is best. Many restaurants use larger plates.   · Get used to using the plate format at home. Then you can use it when you eat out. · Write down your questions about using the plate format. Talk to your doctor, a dietitian, or a diabetes educator about your concerns. Carbohydrate counting  With carbohydrate counting, you plan meals based on the amount of carbohydrate in each food. Carbohydrate raises blood sugar higher and more quickly than any other nutrient. It is found in desserts, breads and cereals, and fruit. It's also found in starchy vegetables such as potatoes and corn, grains such as rice and pasta, and milk and yogurt. Spreading carbohydrate throughout the day helps keep your blood sugar levels within your target range. Your daily amount depends on several things, including your weight, how active you are, which diabetes medicines you take, and what your goals are for your blood sugar levels. A registered dietitian or diabetes educator can help you plan how much carbohydrate to include in each meal and snack. A guideline for your daily amount of carbohydrate is:  · 45 to 60 grams at each meal. That's about the same as 3 to 4 carbohydrate servings. · 15 to 20 grams at each snack. That's about the same as 1 carbohydrate serving. The Nutrition Facts label on packaged foods tells you how much carbohydrate is in a serving of the food. First, look at the serving size on the food label. Is that the amount you eat in a serving? All of the nutrition information on a food label is based on that serving size. So if you eat more or less than that, you'll need to adjust the other numbers. Total carbohydrate is the next thing you need to look for on the label. If you count carbohydrate servings, one serving of carbohydrate is 15 grams. For foods that don't come with labels, such as fresh fruits and vegetables, you'll need a guide that lists carbohydrate in these foods.  Ask your doctor, dietitian, or diabetes educator about books or other nutrition guides you can use.  If you take insulin, you need to know how many grams of carbohydrate are in a meal. This lets you know how much rapid-acting insulin to take before you eat. If you use an insulin pump, you get a constant rate of insulin during the day. So the pump must be programmed at meals to give you extra insulin to cover the rise in blood sugar after meals. When you know how much carbohydrate you will eat, you can take the right amount of insulin. Or, if you always use the same amount of insulin, you need to make sure that you eat the same amount of carbohydrate at meals. If you need more help to understand carbohydrate counting and food labels, ask your doctor, dietitian, or diabetes educator. How do you get started with meal planning? Here are some tips to get started:  · Plan your meals a week at a time. Don't forget to include snacks too. · Use cookbooks or online recipes to plan several main meals. Plan some quick meals for busy nights. You also can double some recipes that freeze well. Then you can save half for other busy nights when you don't have time to cook. · Make sure you have the ingredients you need for your recipes. If you're running low on basic items, put these items on your shopping list too. · List foods that you use to make breakfasts, lunches, and snacks. List plenty of fruits and vegetables. · Post this list on the refrigerator. Add to it as you think of more things you need. · Take the list to the store to do your weekly shopping. Follow-up care is a key part of your treatment and safety. Be sure to make and go to all appointments, and call your doctor if you are having problems. It's also a good idea to know your test results and keep a list of the medicines you take. Where can you learn more? Go to http://becca-padmini.info/  Enter C795 in the search box to learn more about \"Learning About Meal Planning for Diabetes. \"  Current as of: December 19, 2019Content Version: 12.4  © 9295-9040 Healthwise, Incorporated. Care instructions adapted under license by [employer] (which disclaims liability or warranty for this information). If you have questions about a medical condition or this instruction, always ask your healthcare professional. [address] any warranty or liability for your use of this information. Patient Education        Diabetes Foot Health: Care Instructions  Your Care Instructions    When you have diabetes, your feet need extra care and attention. Diabetes can damage the nerve endings and blood vessels in your feet, making you less likely to notice when your feet are injured. Diabetes also limits your body's ability to fight infection and get blood to areas that need it. If you get a minor foot injury, it could become an ulcer or a serious infection. With good foot care, you can prevent most of these problems. Caring for your feet can be quick and easy. Most of the care can be done when you are bathing or getting ready for bed. Follow-up care is a key part of your treatment and safety. Be sure to make and go to all appointments, and call your doctor if you are having problems. It's also a good idea to know your test results and keep a list of the medicines you take. How can you care for yourself at home? · Keep your blood sugar close to normal by watching what and how much you eat, monitoring blood sugar, taking medicines if prescribed, and getting regular exercise. · Do not smoke. Smoking affects blood flow and can make foot problems worse. If you need help quitting, talk to your doctor about stop-smoking programs and medicines. These can increase your chances of quitting for good. · Eat a diet that is low in fats. High fat intake can cause fat to build up in your blood vessels and decrease blood flow. · Inspect your feet daily for blisters, cuts, cracks, or sores.  If you cannot see well, use a mirror or have someone help you. · Take care of your feet:  ? Wash your feet every day. Use warm (not hot) water. Check the water temperature with your wrists or other part of your body, not your feet. ? Dry your feet well. Pat them dry. Do not rub the skin on your feet too hard. Dry well between your toes. If the skin on your feet stays moist, bacteria or a fungus can grow, which can lead to infection. ? Keep your skin soft. Use moisturizing skin cream to keep the skin on your feet soft and prevent calluses and cracks. But do not put the cream between your toes, and stop using any cream that causes a rash. ? Clean underneath your toenails carefully. Do not use a sharp object to clean underneath your toenails. Use the blunt end of a nail file or other rounded tool. ? Trim and file your toenails straight across to prevent ingrown toenails. Use a nail clipper, not scissors. Use an emery board to smooth the edges. · Change socks daily. Socks without seams are best, because seams often rub the feet. You can find socks for people with diabetes from specialty catalogs. · Look inside your shoes every day for things like gravel or torn linings, which could cause blisters or sores. · Buy shoes that fit well:  ? Look for shoes that have plenty of space around the toes. This helps prevent bunions and blisters. ? Try on shoes while wearing the kind of socks you will usually wear with the shoes. ? Avoid plastic shoes. They may rub your feet and cause blisters. Good shoes should be made of materials that are flexible and breathable, such as leather or cloth. ? Break in new shoes slowly by wearing them for no more than an hour a day for several days. Take extra time to check your feet for red areas, blisters, or other problems after you wear new shoes. · Do not go barefoot. Do not wear sandals, and do not wear shoes with very thin soles. Thin soles are easy to puncture.  They also do not protect your feet from hot pavement or cold weather. · Have your doctor check your feet during each visit. If you have a foot problem, see your doctor. Do not try to treat an early foot problem at home. Home remedies or treatments that you can buy without a prescription (such as corn removers) can be harmful. · Always get early treatment for foot problems. A minor irritation can lead to a major problem if not properly cared for early. When should you call for help? Call your doctor now or seek immediate medical care if:    · You have a foot sore, an ulcer or break in the skin that is not healing after 4 days, bleeding corns or calluses, or an ingrown toenail.     · You have blue or black areas, which can mean bruising or blood flow problems.     · You have peeling skin or tiny blisters between your toes or cracking or oozing of the skin.     · You have a fever for more than 24 hours and a foot sore.     · You have new numbness or tingling in your feet that does not go away after you move your feet or change positions.     · You have unexplained or unusual swelling of the foot or ankle.    Watch closely for changes in your health, and be sure to contact your doctor if:    · You cannot do proper foot care. Where can you learn more? Go to http://becca-padmini.info/  Enter A739 in the search box to learn more about \"Diabetes Foot Health: Care Instructions. \"  Current as of: December 19, 2019Content Version: 12.4  © 0675-5997 [employer]. Care instructions adapted under license by [employer] (which disclaims liability or warranty for this information). If you have questions about a medical condition or this instruction, always ask your healthcare professional. [place] [address] any warranty or liability for your use of this information.          Patient Education        Type 2 Diabetes: Care Instructions  Your Care Instructions    Type 2 diabetes is a disease that develops when the body's tissues cannot use insulin properly. Over time, the pancreas cannot make enough insulin. Insulin is a hormone that helps the body's cells use sugar (glucose) for energy. It also helps the body store extra sugar in muscle, fat, and liver cells. Without insulin, the sugar cannot get into the cells to do its work. It stays in the blood instead. This can cause high blood sugar levels. A person has diabetes when the blood sugar stays too high too much of the time. Over time, diabetes can lead to diseases of the heart, blood vessels, nerves, kidneys, and eyes. You may be able to control your blood sugar by losing weight, eating a healthy diet, and getting daily exercise. You may also have to take insulin or other diabetes medicine. Follow-up care is a key part of your treatment and safety. Be sure to make and go to all appointments. Call your doctor if you are having problems. It's also a good idea to know your test results and keep a list of the medicines you take. How can you care for yourself at home? · Keep your blood sugar at a target level (which you set with your doctor). ? Eat a good diet that spreads carbohydrate throughout the day. Carbohydrate--the body's main source of fuel--affects blood sugar more than any other nutrient. Carbohydrate is in fruits, vegetables, milk, and yogurt. It also is in breads, cereals, vegetables such as potatoes and corn, and sugary foods such as candy and cakes. ? Aim for 30 minutes of exercise on most, preferably all, days of the week. Walking is a good choice. You also may want to do other activities, such as running, swimming, cycling, or playing tennis or team sports. If your doctor says it's okay, do muscle-strengthening exercises at least 2 times a week. ? Take your medicines exactly as prescribed. Call your doctor if you think you are having a problem with your medicine. You will get more details on the specific medicines your doctor prescribes.   · Check your blood sugar as often as your doctor recommends. It is important to keep track of any symptoms you have, such as low blood sugar. Also tell your doctor if you have any changes in your activities, diet, or insulin use. · Talk to your doctor before you start taking aspirin every day. Aspirin can help certain people lower their risk of a heart attack or stroke. But taking aspirin isn't right for everyone, because it can cause serious bleeding. · Do not smoke. If you need help quitting, talk to your doctor about stop-smoking programs and medicines. These can increase your chances of quitting for good. · Keep your cholesterol and blood pressure at normal levels. You may need to take one or more medicines to reach your goals. Take them exactly as directed. Do not stop or change a medicine without talking to your doctor first.  When should you call for help? Call 911 anytime you think you may need emergency care. For example, call if:    · You passed out (lost consciousness), or you suddenly become very sleepy or confused. (You may have very low blood sugar.)    Call your doctor now or seek immediate medical care if:    · Your blood sugar is 300 mg/dL or is higher than the level your doctor has set for you.     · You have symptoms of low blood sugar, such as:  ? Sweating. ? Feeling nervous, shaky, and weak. ? Extreme hunger and slight nausea. ? Dizziness and headache.  ? Blurred vision. ? Confusion.    Watch closely for changes in your health, and be sure to contact your doctor if:    · You often have problems controlling your blood sugar.     · You have symptoms of long-term diabetes problems, such as:  ? New vision changes. ? New pain, numbness, or tingling in your hands or feet. ? Skin problems. Where can you learn more? Go to http://becca-padmini.info/  Enter C553 in the search box to learn more about \"Type 2 Diabetes: Care Instructions. \"  Current as of: December 19, 2019Content Version: 12.4  © 3258-4003 [employer]. Care instructions adapted under license by [employer] (which disclaims liability or warranty for this information). If you have questions about a medical condition or this instruction, always ask your healthcare professional. [place] [address] any warranty or liability for your use of this information.

## 2020-04-01 NOTE — DIABETES MGMT
Patient seen for assessment regarding diabetes management. Patient has a past medical history of neuropathy, mixed hyperlipidemia, elevated blood pressure, left foot drop. Patient states they were diagnosed in 2007 with diabetes and voices a positive family history of diabetes. Patient states they do have a working glucometer with supplies at home, stating \"I use the 14 day awilda. \" Per patient they Nolan Beverly supposed to\" check blood glucose levels \"4\" times a day, but patient admits to forgetting to scan sensor at times. Per patient their blood glucose levels have been running \"close to 400s\" at home. Patient states they are currently taking Tresiba 80 units nightly, Humalog 20 units with meals and SSI 3 units for every 50 over 150, Metformin 750mg BID, and Jaridance 25mg daily at home for management of diabetes. Patient admits to frequently missing Metformin doses, missing Humalog doses, not using SSI part of Humalog dose, and occasionally missing Jaridance doses. Patient states she used Trulicity and Victoza in the past, but these made her very nauseous. Patient wondering if she should try Trulicity again encouraged her to discuss this with her endocrinologist. Patient has attended formal diabetes education in the past, but states \"it was when I was pregnant. \" Patient's children are ages 8 and 13. Patient sees Arnaldo Hauser with endocrinology, but states she missed her February appointment due to sickness. Patient states she is out of refills for her Metformin, provider updated via [employer] regarding patient request for Metformin prescription at discharge. Patient given educational material, \"Diabetes Self-Management: A Patient Teaching Guide\", which was reviewed with patient. Explained basic physiology of diabetes, as well as causes, signs and symptoms, and treatments for hypoglycemia and hyperglycemia.  Patient becomes very frustrated while talking to educator stating \"I have no quality of life I can't do anything without thinking about diabetes, why even bother trying to do better it never works. \" Described the effects of poor glycemic control and the development of long-term complications such as renal, eye, nerve, and cardiovascular disease. Discussed overcoming barriers to diabetes self management. Discussed that it does take work on patient's part to get diabetes under control as patient will need to frequently monitoring blood glucose levels, consistently take medications, and call provider to discuss further titration when blood glucose levels remain out of control, but encouraged patient that she can work to manage her diabetes and not have it \"take over\" her life. Described proper diabetic foot care and the importance of checking feet daily. Patient states Osvaldo Gormans that's how this started with new shoes. \" Patient has numbness and tingling in feet. Per patient they typically drink water at home, patient denies drinking sweetened beverages. Reviewed alternative beverages to help improve glycemic control. 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 Patient states occasionally she will eat a yogurt or piece of fruit for breakfast. Patient does not take Humalog insulin with breakfast unless she eats a meal. Patient states lunch and dinner vary. Patient states she keeps her Humalog in the [employer] where she works. Patient states her family eats out and cooks at home stating \"its about split 60% to 40%. \"  Educated re: effects of carbohydrates on blood glucose, the \"plate method\" of healthy meal planning. Patient states \"it doesn't matter if I eat a bowl of cereal or a bowl of lettuce my sugar does the same thing. \" Discussed basics of healthy meal planning and tips for eating out with diabetes.  Educated Consistent Carbohydrate Diet and discussed the basics of carb counting and how to read a nutrition label. Explained to patient that typical recommended carb amounts for a woman trying to lose weight is \"2-3 carb servings or 30-45g per meal.\" Discussed that patient insulin needs in the hospital are significantly less than at home. Patient states Sommer Saucedo do you know how much carbs is in something. \" Encouraged patient to use her smartphone to look up total carbs in foods. Patient admits to eating more than 2-3 carbs per meal. Encouraged compliance with diabetic diet and encouraged patient to discuss her eating habits and carb counting with provider. Educated patient regarding the benefits of physical activity (as cleared by provider) on glycemic control. Patient states she has not been back to physical therapy since December. Discussed possiblity of doing arm exercises as approved by provider. Patient states \"I don't understand why my sugar was high even when I wasn't eating. \" Explained the relationship between hyperglycemia and infection and delayed healing. Discussed target goals for blood glucose and A1C. Educated patient regarding diabetic medications including mechanism of action, timing, and possible side effects. Patient admits to not liking taking huge doses of insulin due the side effect of weight gain. Patient also states \"when I take insulin I can smell it on my skin and it makes me nauseous. \" Discussed alternating sites and ways to best mitigate weight gain associated with insulin therapy. Patient verbalizes understanding of teaching. Explained the importance of blood glucose monitoring to patient and how this will provide his primary care provider with more information to help them safely titrate their regimen. Recommended frequency of at least 6 times a day (pre/post prandial) and to record in log book to take to primary care provider appointment. Patient given log sheet to use to record blood glucose levels, foods, and insulins.  Encouraged patient to call endocrinology when blood glucose levels remain out of target goals. Patient would likely benefit from continued diabetes outpatient education. Patient provided with contact information to [facility] program to pursue at their convenience after discharge with their primary care provider when program is reopened. Educated patient regarding current a carlos/bolus regimen including type of insulin, timing with meals, onset, duration of effect, and peak of insulin dose. Educated patient on the differences between prandial insulin and sliding scale insulin. Patient states she didn't realize when she was supposed to take the SSI stating \"sometimes I would take it and try to wait hours until my sugar came down to eat or sometimes I would just take the set amount and no SSI. Instructed patient to always seek guidance from their primary care provider about adjusting their insulin doses and not to adjust them on their own as this could negatively impact their glycemic control or result in hypoglycemia. Patient verbalizes understanding. Encouraged compliance with discharge regimen. Encouraged patient to continue to work on lifestyle modifications and to follow up with endocrinology for further titration of regimen. Patient verbalized understanding and voices no further questions regarding diabetes management.

## 2020-04-01 NOTE — PROGRESS NOTES
Problem: Falls - Risk of  Goal: *Absence of Falls  Description: Document Andry Cutler Fall Risk and appropriate interventions in the flowsheet.   4/1/2020 1115 by Heidy Crump RN  Outcome: Progressing Towards Goal  Note: Fall Risk Interventions:            Medication Interventions: Teach patient to arise slowly, Patient to call before getting OOB    Elimination Interventions: Call light in reach    History of Falls Interventions: Room close to nurse's station, Investigate reason for fall      4/1/2020 0742 by Heidy Crump RN  Outcome: Progressing Towards Goal  Note: Fall Risk Interventions:            Medication Interventions: Teach patient to arise slowly, Patient to call before getting OOB    Elimination Interventions: Call light in reach    History of Falls Interventions: Room close to nurse's station, Investigate reason for fall         Problem: Patient Education: Go to Patient Education Activity  Goal: Patient/Family Education  4/1/2020 1115 by Heidy Crump RN  Outcome: Progressing Towards Goal  4/1/2020 0742 by Heidy Crump RN  Outcome: Progressing Towards Goal     Problem: General Infection Care Plan (Adult and Pediatric)  Goal: Improvement in signs and symptoms of infection  4/1/2020 1115 by Heidy Crump RN  Outcome: Progressing Towards Goal  4/1/2020 0742 by Heidy Crump RN  Outcome: Progressing Towards Goal  Goal: *Optimize nutritional status  4/1/2020 1115 by Heidy Crump RN  Outcome: Progressing Towards Goal  4/1/2020 0742 by Heidy Crump RN  Outcome: Progressing Towards Goal     Problem: Patient Education: Go to Patient Education Activity  Goal: Patient/Family Education  4/1/2020 1115 by Heidy Crump RN  Outcome: Progressing Towards Goal  4/1/2020 0742 by Heidy Crump RN  Outcome: Progressing Towards Goal     Problem: Pain  Goal: *Control of Pain  4/1/2020 1115 by Heidy Crump RN  Outcome: Progressing Towards Goal  4/1/2020 0742 by Jennifer Mcdonald Astrid Gregorio RN  Outcome: Progressing Towards Goal     Problem: Patient Education: Go to Patient Education Activity  Goal: Patient/Family Education  4/1/2020 1115 by Maninder Sol RN  Outcome: Progressing Towards Goal  4/1/2020 0742 by Maninder Sol RN  Outcome: Progressing Towards Goal     Problem: Diabetes Self-Management  Goal: *Disease process and treatment process  Description: Define diabetes and identify own type of diabetes; list 3 options for treating diabetes. 4/1/2020 1115 by Maninder Sol RN  Outcome: Progressing Towards Goal  4/1/2020 0742 by Maninder Sol RN  Outcome: Progressing Towards Goal  Goal: *Incorporating nutritional management into lifestyle  Description: Describe effect of type, amount and timing of food on blood glucose; list 3 methods for planning meals. 4/1/2020 1115 by Maninder Sol RN  Outcome: Progressing Towards Goal  4/1/2020 0742 by Maninder Sol RN  Outcome: Progressing Towards Goal  Goal: *Incorporating physical activity into lifestyle  Description: State effect of exercise on blood glucose levels. 4/1/2020 1115 by Maninder Sol RN  Outcome: Progressing Towards Goal  4/1/2020 0742 by Maninder Sol RN  Outcome: Progressing Towards Goal  Goal: *Developing strategies to promote health/change behavior  Description: Define the ABC's of diabetes; identify appropriate screenings, schedule and personal plan for screenings. 4/1/2020 1115 by Maninder Sol RN  Outcome: Progressing Towards Goal  4/1/2020 0742 by Maninder Sol RN  Outcome: Progressing Towards Goal  Goal: *Using medications safely  Description: State effect of diabetes medications on diabetes; name diabetes medication taking, action and side effects.   4/1/2020 1115 by Maninder Sol RN  Outcome: Progressing Towards Goal  4/1/2020 0742 by Maninder Sol RN  Outcome: Progressing Towards Goal  Goal: *Monitoring blood glucose, interpreting and using results  Description: Identify recommended blood glucose targets  and personal targets. 4/1/2020 1115 by Todd Gallegos RN  Outcome: Progressing Towards Goal  4/1/2020 0742 by Todd Gallegos RN  Outcome: Progressing Towards Goal  Goal: *Prevention, detection, treatment of acute complications  Description: List symptoms of hyper- and hypoglycemia; describe how to treat low blood sugar and actions for lowering  high blood glucose level. 4/1/2020 1115 by Todd Gallegos RN  Outcome: Progressing Towards Goal  4/1/2020 0742 by Todd Gallegos RN  Outcome: Progressing Towards Goal  Goal: *Prevention, detection and treatment of chronic complications  Description: Define the natural course of diabetes and describe the relationship of blood glucose levels to long term complications of diabetes.   4/1/2020 1115 by Todd Gallegos RN  Outcome: Progressing Towards Goal  4/1/2020 0742 by Todd Gallegos RN  Outcome: Progressing Towards Goal  Goal: *Developing strategies to address psychosocial issues  Description: Describe feelings about living with diabetes; identify support needed and support network  4/1/2020 1115 by Todd Gallegos RN  Outcome: Progressing Towards Goal  4/1/2020 0742 by Todd Gallegos RN  Outcome: Progressing Towards Goal  Goal: *Insulin pump training  4/1/2020 1115 by Todd Gallegos RN  Outcome: Progressing Towards Goal  4/1/2020 0742 by Todd Gallegos RN  Outcome: Progressing Towards Goal  Goal: *Sick day guidelines  4/1/2020 1115 by Todd Gallegos RN  Outcome: Progressing Towards Goal  4/1/2020 0742 by Todd Gallegos RN  Outcome: Progressing Towards Goal  Goal: *Patient Specific Goal (EDIT GOAL, INSERT TEXT)  4/1/2020 1115 by Todd Gallegos RN  Outcome: Progressing Towards Goal  4/1/2020 0742 by Todd Gallegos RN  Outcome: Progressing Towards Goal     Problem: Patient Education: Go to Patient Education Activity  Goal: Patient/Family Education  4/1/2020 1115 by Kelsy Giraldo HAYLEE ADAM  Outcome: Progressing Towards Goal  4/1/2020 0742 by Uzair Cano RN  Outcome: Progressing Towards Goal     Problem: Patient Education: Go to Patient Education Activity  Goal: Patient/Family Education  Outcome: Progressing Towards Goal

## 2020-04-01 NOTE — PROGRESS NOTES
Infectious Disease Progress Note    Today's Date: 2020   Admit Date: 3/29/2020    Impression:   · R fifth toe diabetic foot infection, suspect bone and deep tissue involvement  · Type 2 DM with neurovascular complications, poorly controlled    Plan:   · Given poorly controlled DM, recommend 2 weeks doxycyline 100 mg bid and Omnicef 300 mg bid post op. Anti-infectives:   1. Vancomycin  2. Ceftriaxone    Subjective:   Chart reviewed,  Patient not seen. Underwent amputation R 5th digit yesterday with resection distal 5th Metatarsal as well as toe non-viable. Purulence encountered. MRI noted OM in phalanges.       Objective:     Visit Vitals  BP (!) 145/95 (BP 1 Location: Left arm, BP Patient Position: At rest)   Pulse 89   Temp 98.5 °F (36.9 °C)   Resp 16   Ht 5' 8\" (1.727 m)   Wt 85.3 kg (188 lb)   SpO2 99%   BMI 28.59 kg/m²     Temp (24hrs), Av.1 °F (36.7 °C), Min:97.8 °F (36.6 °C), Max:98.5 °F (36.9 °C)        Data Review:     CBC:   Recent Labs     20  0454 20  1643   WBC 5.3 4.6 6.2 7.9   RBC 3.79* 4.03* 3.87* 4.53   HGB 11.2* 11.8 11.4* 13.5   HCT 33.5* 35.8 33.7* 39.6    263 243 267   GRANS  --   --   --  66   LYMPH  --   --   --  18   EOS  --   --   --  7     CMP:   Recent Labs     20  04520  0520  1643   * 248* 232* 390*    137 138 129*   K 3.1* 3.6 3.5 4.6    103 105 96*   CO2 28 29 28 26   BUN 6 4* 5* 7   CREA 0.44* 0.44* 0.51* 0.89   CA 8.3 8.5 8.0* 8.8   AGAP 4* 5* 5* 7   AP  --   --   --  102   TP  --   --   --  8.0   ALB  --   --   --  2.9*   GLOB  --   --   --  5.1*   AGRAT  --   --   --  0.6*     Liver Enzymes:   Recent Labs     20  1643   TP 8.0   ALB 2.9*      SGOT 38*        Microbiology:     All Micro Results     Procedure Component Value Units Date/Time    CULTURE, BLOOD [[id number]] Collected:  20 3592    Order Status:  Completed Specimen:  Blood Updated: 04/01/20 0803     Special Requests: --        LEFT  FOREARM       Culture result: NO GROWTH 3 DAYS       CULTURE, BLOOD [[id number]] Collected:  03/29/20 1800    Order Status:  Completed Specimen:  Blood Updated:  04/01/20 0803     Special Requests: --        RIGHT  FOREARM       Culture result: NO GROWTH 3 DAYS             Imaging:   Study Result     History: Fifth digit ulceration     EXAM: MRI right foot with and without contrast     TECHNIQUE: Multiplanar multisequence imaging is performed both before and after  the uneventful administration of 17 cc Dotarem     FINDINGS: There is low T1 and high T2 signal intensity with enhancement  involving the fifth proximal and distal phalanges. There is an ulceration  present at the level of the fifth interphalangeal articulation. No drainable  fluid collection identified. There is midfoot degenerative change present. The  ligament of Lisfranc is intact.     IMPRESSION  IMPRESSION:  1. Osteomyelitis involving the fifth proximal and distal phalanges. There is an  ulceration with subcutaneous cellulitis. No evidence of abscess. 2. Midfoot degenerative change.          Signed By: Javon Ward MD     April 1, 2020

## 2020-04-01 NOTE — PROGRESS NOTES
Problem: Mobility Impaired (Adult and Pediatric)  Goal: *Acute Goals and Plan of Care (Insert Text)  Note: STG:  (1.)Ms. Mary Christensen will move from supine to sit and sit to supine  with INDEPENDENCE within 2 treatment day(s). (2.)Ms. Mary Christensen will transfer from bed to chair and chair to bed with SUPERVISION using the least restrictive device within 2 treatment day(s). (3.)Ms. Mary Christensen will ambulate with SUPERVISION for 100 feet TTWB R LE with the least restrictive device within 2 treatment day(s). (4.)Pt. will climb up/down 8 steps with B rails and CGA within 2 days  (5.)Pt. will increase B LE strength 1/2 grade within 2 days      ________________________________________________________________________________________________      PHYSICAL THERAPY: Initial Assessment and AM 4/1/2020  TTWB R LE s/p R 5th ray amputation  INPATIENT: PT Visit Days : 1  Payor: [facility] / Plan: [id number] [facility] / Product Type: PPO /       NAME/AGE/GENDER: Elton Holland is a 38 y.o. female   PRIMARY DIAGNOSIS: Diabetic foot infection ([facility] [E11.628, L08.9] Osteomyelitis ([facility] Osteomyelitis ([facility]  Procedure(s) (LRB):  RIGHT FOOT 5TH RAY AMPUTATION   (Right)  1 Day Post-Op  ICD-10: Treatment Diagnosis:    Generalized Muscle Weakness (M62.81)  Other lack of cordination (R27.8)  Other abnormalities of gait and mobility (R26.89)   Precaution/Allergies:  Patient has no known allergies. ASSESSMENT:     Ms. Mary Christensen presents S/P R 5th ray amputation due to osteomyelitis. She 's not very pleasant this am. Mad that I got her up and put her in recliner this am. She would benefit from further PT if she stays to address her decrease in strength, coordination, Standing balance, functional mobility and awareness of wt bearing precaution. She plans on returning home at [place] with the support of her spouse and kids. I don't predict that she'll require HH PT at [place].     This section established at most recent assessment   PROBLEM LIST (Impairments causing functional limitations):  Decreased Strength  Decreased ADL/Functional Activities  Decreased Transfer Abilities  Decreased Ambulation Ability/Technique  Decreased Balance  Decreased Activity Tolerance  Decreased Knowledge of Precautions   INTERVENTIONS PLANNED: (Benefits and precautions of physical therapy have been discussed with the patient.)  Balance Exercise  Bed Mobility  Gait Training  Range of Motion (ROM)  Therapeutic Activites  Therapeutic Exercise/Strengthening  Transfer Training     TREATMENT PLAN: Frequency/Duration: daily for duration of hospital stay  Rehabilitation Potential For Stated Goals: [facility] (at time of discharge pending progress):    Placement: It is my opinion, based on this patient's performance to date, that Ms. Elizabeth Shaw may benefit from being discharged with NO further skilled therapy due to the high likelihood of returning to baseline. Equipment:   Walkers, Type: Rolling Walker              HISTORY:   History of Present Injury/Illness (Reason for Referral): Admitted with osteomyelitis R 5th ray S/P amputation   Past Medical History/Comorbidities:   Ms. Elizabeth Shaw  has a past medical history of Diabetic peripheral neuropathy associated with type 2 diabetes mellitus, Nonproliferative diabetic retinopathy, and Type 2 diabetes mellitus, uncontrolled. Ms. Elizabeth Shaw  has a past surgical history that includes hx other surgical () and hx  section ( and ).   Social History/Living Environment:   Home Environment: Private residence  # Steps to Enter: 1  Rails to Enter: No  One/Two Story Residence: Split level  # of Interior Steps: 8  Height of Each Step (in): 7.5 inches  Interior Rails: Both  Lift Chair Available: Yes  Living Alone: No  Support Systems: Child(cara), Spouse/Significant Other/Partner, Family member(s)  Patient Expects to be Discharged to[de-identified] Private residence  Current DME Used/Available at [facility]: Wheelchair, Wheelchair, power, Shower chair, Glucometer  Tub or Shower Type: Tub/Shower combination  Prior Level of Function/Work/Activity:  Functionally I. 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  Dominant Side:         RIGHT   Number of Personal Factors/Comorbidities that affect the Plan of Care: 1-2: MODERATE COMPLEXITY   EXAMINATION:   Most Recent Physical Functioning:   Gross Assessment:  AROM: Generally decreased, functional(all 4s)  Strength: Generally decreased, functional(B UEs 3+; B hips/knees 3+; L ankle DF 1, R DF 2)  Coordination: Generally decreased, functional(all 4s)  Sensation: Impaired(B lower legs)               Posture:     Balance:  Sitting: Intact  Standing: With support Bed Mobility:  Rolling: Independent  Supine to Sit: Supervision  Wheelchair Mobility:     Transfers:  Sit to Stand: Contact guard assistance  Stand to Sit: Contact guard assistance  Bed to Chair: Contact guard assistance  Gait:  Right Side Weight Bearing: Toe touch(appears to be  bearing more wt then TT)  Speed/Sonja: Pace decreased (<100 feet/min)  Step Length: Left shortened  Stance: Right decreased  Gait Abnormalities: Antalgic; Step to gait; Foot drop(L foot drop. Bearing more like partial wt on R LE.)  Distance (ft): 40 Feet (ft)  Assistive Device: Walker, rolling  Ambulation - Level of Assistance: Contact guard assistance  Interventions: Safety awareness training;Verbal cues      Body Structures Involved:  Bones  Joints  Muscles Body Functions Affected:  Neuromusculoskeletal  Movement Related Activities and Participation Affected:  General Tasks and Demands  Mobility  Self Care   Number of elements that affect the Plan of Care: 4+: HIGH COMPLEXITY   CLINICAL PRESENTATION:   Presentation: Stable and uncomplicated: LOW COMPLEXITY   CLINICAL DECISION MAKING:   [place] Form  How much difficulty does the patient currently have. .. Unable A Lot A Little None   1.   Turning over in bed (including adjusting bedclothes, sheets and blankets)? [] 1   [] 2   [] 3   [x] 4   2. Sitting down on and standing up from a chair with arms ( e.g., wheelchair, bedside commode, etc.)   [] 1   [] 2   [x] 3   [] 4   3. Moving from lying on back to sitting on the side of the bed? [] 1   [] 2   [x] 3   [] 4   How much help from another person does the patient currently need. .. Total A Lot A Little None   4. Moving to and from a bed to a chair (including a wheelchair)? [] 1   [] 2   [x] 3   [] 4   5. Need to walk in hospital room? [] 1   [] 2   [x] 3   [] 4   6. Climbing 3-5 steps with a railing? [] 1   [x] 2   [] 3   [] 4   © 2007, Trustees of [address] [place] [address], under license to [employer]. All rights reserved      Score:  Initial: 18 Most Recent: X (Date: -- )    Interpretation of Tool:  Represents activities that are increasingly more difficult (i.e. Bed mobility, Transfers, Gait). Medical Necessity:     Patient demonstrates   fair   rehab potential due to higher previous functional level. Reason for Services/Other Comments:  Patient   continues to require present interventions due to patient's inability to perform functional mobility safely   .    Use of outcome tool(s) and clinical judgement create a POC that gives a: Clear prediction of patient's progress: LOW COMPLEXITY            TREATMENT:   (In addition to Assessment/Re-Assessment sessions the following treatments were rendered)   Pre-treatment Symptoms/Complaints:  doesn't want to be up out of bed  Pain: Initial:   Pain Intensity 1: 0  Pain Location 1: Foot  Pain Orientation 1: Right  Pain Intervention(s) 1: Elevation, Ambulation/Increased Activity, Repositioned  Post Session:  0     Assessment/Reassessment only, no treatment provided today    Braces/Orthotics/Lines/Etc:   daily dressing change  O2 Device: Room air  Treatment/Session Assessment:    Response to Treatment:  tolerated with some inconsistency with WB  Interdisciplinary Collaboration:   Physical Therapist  After treatment position/precautions:   Up in chair  Bed/Chair-wheels locked  Call light within reach  Nurse at bedside   Compliance with Program/Exercises: Will assess as treatment progresses  Recommendations/Intent for next treatment session: \"Next visit will focus on advancements to more challenging activities and reduction in assistance provided\".   Total Treatment Duration:  PT Patient Time In/Time Out  Time In: 0700  Time Out: [address], PT

## 2020-04-01 NOTE — PROGRESS NOTES
Problem: Falls - Risk of  Goal: *Absence of Falls  Description: Document Ngoc Bare Fall Risk and appropriate interventions in the flowsheet. Outcome: Progressing Towards Goal  Note: Fall Risk Interventions:            Medication Interventions: Teach patient to arise slowly, Patient to call before getting OOB    Elimination Interventions: Call light in reach    History of Falls Interventions: Room close to nurse's station, Investigate reason for fall         Problem: Patient Education: Go to Patient Education Activity  Goal: Patient/Family Education  Outcome: Progressing Towards Goal     Problem: General Infection Care Plan (Adult and Pediatric)  Goal: Improvement in signs and symptoms of infection  Outcome: Progressing Towards Goal  Goal: *Optimize nutritional status  Outcome: Progressing Towards Goal     Problem: Patient Education: Go to Patient Education Activity  Goal: Patient/Family Education  Outcome: Progressing Towards Goal     Problem: Pain  Goal: *Control of Pain  Outcome: Progressing Towards Goal     Problem: Patient Education: Go to Patient Education Activity  Goal: Patient/Family Education  Outcome: Progressing Towards Goal     Problem: Diabetes Self-Management  Goal: *Disease process and treatment process  Description: Define diabetes and identify own type of diabetes; list 3 options for treating diabetes. Outcome: Progressing Towards Goal  Goal: *Incorporating nutritional management into lifestyle  Description: Describe effect of type, amount and timing of food on blood glucose; list 3 methods for planning meals. Outcome: Progressing Towards Goal  Goal: *Incorporating physical activity into lifestyle  Description: State effect of exercise on blood glucose levels. Outcome: Progressing Towards Goal  Goal: *Developing strategies to promote health/change behavior  Description: Define the ABC's of diabetes; identify appropriate screenings, schedule and personal plan for screenings.   Outcome: Progressing Towards Goal  Goal: *Using medications safely  Description: State effect of diabetes medications on diabetes; name diabetes medication taking, action and side effects. Outcome: Progressing Towards Goal  Goal: *Monitoring blood glucose, interpreting and using results  Description: Identify recommended blood glucose targets  and personal targets. Outcome: Progressing Towards Goal  Goal: *Prevention, detection, treatment of acute complications  Description: List symptoms of hyper- and hypoglycemia; describe how to treat low blood sugar and actions for lowering  high blood glucose level. Outcome: Progressing Towards Goal  Goal: *Prevention, detection and treatment of chronic complications  Description: Define the natural course of diabetes and describe the relationship of blood glucose levels to long term complications of diabetes.   Outcome: Progressing Towards Goal  Goal: *Developing strategies to address psychosocial issues  Description: Describe feelings about living with diabetes; identify support needed and support network  Outcome: Progressing Towards Goal  Goal: *Insulin pump training  Outcome: Progressing Towards Goal  Goal: *Sick day guidelines  Outcome: Progressing Towards Goal  Goal: *Patient Specific Goal (EDIT GOAL, INSERT TEXT)  Outcome: Progressing Towards Goal     Problem: Patient Education: Go to Patient Education Activity  Goal: Patient/Family Education  Outcome: Progressing Towards Goal

## 2020-04-01 NOTE — PROGRESS NOTES
Patient seen and examined this morning. Her dressing is clean dry and intact. She is having no pains. She is afebrile. She is okay for discharge from my standpoint. She will need to follow-up in our office in 1 week at [phone]. Instructed to keep her dressing on and keep it clean dry and intact and is also instructed elevation today. I have ordered a postop shoe for her to weight-bear on which hopefully will come prior to discharge.

## 2020-04-01 NOTE — PROGRESS NOTES
Shift assessment complete. A&O x 4. No acute distress at this time. Dressing to right foot c/d/i. IVF infusing without difficulty. Call light within reach.

## 2020-04-01 NOTE — DISCHARGE SUMMARY
Hospitalist Discharge Summary     Patient ID:  Gavin Lucio  [id number]  66 y.o.  1975  Admit date: 3/29/2020  4:21 PM  Discharge date and time: 4/1/2020  Attending: Reyna Springer MD  PCP:  Jonas Arnold MD  Treatment Team: Attending Provider: Reyna Springer MD; Consulting Provider: Russ Lockhart MD; Utilization Review: Yanet South RN; Surgeon: Santana Garcia MD; Care Manager: Maria Elena Elmore RN; Care Manager: Martha Vanessa RN; Physical Therapist: Blanca Meza PT; Staff Nurse: Todd Gallegos RN; Utilization Review: River Campbell RN    Principal Diagnosis Osteomyelitis [facility])   Principal Problem:    Osteomyelitis ([facility] (3/31/2020)    Active Problems:    Cellulitis (3/18/2016)      Uncontrolled type 2 diabetes mellitus ()      Nonproliferative diabetic retinopathy  ()      Diabetic foot infection ([facility] (3/29/2020)             Hospital Course:  Please refer to the admission H&P for details of presentation. In summary, 27-year-old  lady with DM type II, diabetic neuropathy, nonproliferative diabetic retinopathy, presented to the ED at [facility] complaining of worsening redness and discoloration of the right fifth toe.  The patient was actually seen in the ED 2 weeks ago for the foot infection and was discharged home on Augmentin.  Initial improvement, but then worsened with the right foot and right fifth toe have been red, warm and swollen.  MRI showing evidence of osteo of R 5th toe. She was started on Vanc, Rocephin. Taken for 5th ray amputation 3/31/2020. Diabetic foot infection  Osteomyelitis  MRI with osteomyelitis of 5th digit. - s/p R 5th ray amputation 3/31/20  - discharge on Omnicef, Doxy x 2 weeks per ID recs  - f/u with ortho as outpt     Mild hyponatremia  Some component of pseudohyponatremia on admit. Resolved with IVFs.      Uncontrolled DM2  Diabetic retinopathy and neuropathy  Follows with endocrine but very difficult time maintaining control per pt. - resume home insulin and f/u closely with endo outpt  - A1c 10.9     HLD  - cont statin    Significant Diagnostic Studies:   MRI FOOT RT W WO CONT   Final Result   IMPRESSION:   1. Osteomyelitis involving the fifth proximal and distal phalanges. There is an   ulceration with subcutaneous cellulitis. No evidence of abscess. 2. Midfoot degenerative change. XR FOOT RT MIN 3 V   Final Result   Impression:      No acute osseous or joint abnormalities. If clinical concern persists, further   evaluation with either 3 phase bone scan or MRI is recommended to exclude   osteomyelitis                  Labs: Results:       Chemistry Recent Labs     04/01/20 0454 03/31/20 0524 03/30/20 0459 03/29/20  1643   * 248* 232* 390*    137 138 129*   K 3.1* 3.6 3.5 4.6    103 105 96*   CO2 28 29 28 26   BUN 6 4* 5* 7   CREA 0.44* 0.44* 0.51* 0.89   CA 8.3 8.5 8.0* 8.8   AGAP 4* 5* 5* 7   AP  --   --   --  102   TP  --   --   --  8.0   ALB  --   --   --  2.9*   GLOB  --   --   --  5.1*   AGRAT  --   --   --  0.6*      CBC w/Diff Recent Labs     04/01/20 0454 03/31/20 0524 03/30/20 0459 03/29/20  1643   WBC 5.3 4.6 6.2 7.9   RBC 3.79* 4.03* 3.87* 4.53   HGB 11.2* 11.8 11.4* 13.5   HCT 33.5* 35.8 33.7* 39.6    263 243 267   GRANS  --   --   --  66   LYMPH  --   --   --  18   EOS  --   --   --  7      Cardiac Enzymes No results for input(s): CPK, CKND1, NAHED in the last 72 hours. No lab exists for component: CKRMB, TROIP   Coagulation No results for input(s): PTP, INR, APTT, INREXT in the last 72 hours. Lipid Panel Lab Results   Component Value Date/Time    Cholesterol, total 168 11/08/2019 08:06 AM    HDL Cholesterol 38 (L) 11/08/2019 08:06 AM    LDL, calculated 110 (H) 11/08/2019 08:06 AM    VLDL, calculated 20 11/08/2019 08:06 AM    Triglyceride 102 11/08/2019 08:06 AM      BNP No results for input(s): BNPP in the last 72 hours.    Liver Enzymes Recent Labs 03/29/20  1643   TP 8.0   ALB 2.9*      SGOT 38*      Thyroid Studies Lab Results   Component Value Date/Time    TSH 0.508 11/08/2019 08:06 AM            Discharge Exam:  Visit Vitals  BP (!) 145/95 (BP 1 Location: Left arm, BP Patient Position: At rest)   Pulse 89   Temp 98.5 °F (36.9 °C)   Resp 16   Ht 5' 8\" (1.727 m)   Wt 85.3 kg (188 lb)   SpO2 99%   BMI 28.59 kg/m²     General appearance: alert, cooperative, no distress, appears stated age  Lungs: clear to auscultation bilaterally  Heart: regular rate and rhythm, S1, S2 normal, no murmur, click, rub or gallop  Abdomen: soft, non-tender. Bowel sounds normal. No masses,  no organomegaly  Extremities: R foot bandaged  Neurologic: Grossly normal    Disposition: home  Discharge Condition: stable  Patient Instructions:   Current Discharge Medication List      START taking these medications    Details   potassium chloride (K-DUR, KLOR-CON) 20 mEq tablet Take 1 Tab by mouth two (2) times a day. Qty: 6 Tab, Refills: 0      doxycycline (VIBRAMYCIN) 100 mg capsule Take 1 Cap by mouth two (2) times a day for 14 days. Qty: 28 Cap, Refills: 0      cefdinir (OMNICEF) 300 mg capsule Take 1 Cap by mouth two (2) times a day for 14 days. Qty: 28 Cap, Refills: 0         CONTINUE these medications which have NOT CHANGED    Details   escitalopram oxalate (LEXAPRO) 10 mg tablet Take 10 mg by mouth daily. Pt reports she takes as needed. Refills: 0      JARDIANCE 25 mg tablet Take 1 Tab by mouth daily.   Qty: 90 Tab, Refills: 3    Associated Diagnoses: Uncontrolled type 2 diabetes mellitus without complication, with long-term current use of insulin ([facility])      HUMALOG KWIKPEN INSULIN 100 unit/mL kwikpen Take 20 units before each meal plus correction 3/50 >150, max daily dose 100 units  Qty: 30 Pen, Refills: 3    Associated Diagnoses: Uncontrolled type 2 diabetes mellitus without complication, with long-term current use of insulin ([facility])      FREESTYLE MILIND 14 DAY READER [facility] Use to check blood glucose 4 times daily E11.65  Qty: 1 Each, Refills: 0    Associated Diagnoses: Uncontrolled type 2 diabetes mellitus without complication, with long-term current use of insulin ([facility])      FREESTYLE MILIND 14 DAY SENSOR kit Use to check blood glucose 4 times daily E11.65  Qty: 2 Kit, Refills: 11    Associated Diagnoses: Uncontrolled type 2 diabetes mellitus without complication, with long-term current use of insulin ([facility])      metFORMIN ER (GLUCOPHAGE XR) 750 mg tablet Take 1 Tab by mouth daily. Qty: 60 Tab, Refills: 11    Associated Diagnoses: Uncontrolled type 2 diabetes mellitus without complication, with long-term current use of insulin ([facility])      TRESIBA FLEXTOUCH U-200 200 unit/mL (3 mL) inpn 80 Units by SubCUTAneous route daily. Qty: 12 Pen, Refills: 3    Associated Diagnoses: Uncontrolled type 2 diabetes mellitus without complication, with long-term current use of insulin ([facility])      aspirin/acetaminophen/caffeine (EXCEDRIN MIGRAINE PO) Take 1 Tab by mouth as needed. atorvastatin (LIPITOR) 20 mg tablet Take 1 Tab by mouth daily. Qty: 30 Tab, Refills: 11    Associated Diagnoses: Mixed hyperlipidemia      ONE TOUCH DELICA 33 gauge misc Check blood glucose three times daily. Dx E11.65  Qty: 300 Lancet, Refills: 3    Associated Diagnoses: Uncontrolled type 2 diabetes mellitus without complication, with long-term current use of insulin ([facility])      FRANSISCO PEN NEEDLE 32 gauge x 5/32\" ndle 2 injections per day. Dx E11.65  Qty: 200 Pen Needle, Refills: 3    Associated Diagnoses: Uncontrolled type 2 diabetes mellitus without complication, with long-term current use of insulin ([facility])      gabapentin (NEURONTIN) 300 mg capsule Take 1 Cap by mouth two (2) times a day.   Qty: 60 Cap, Refills: 11    Associated Diagnoses: Diabetic peripheral neuropathy associated with type 2 diabetes mellitus ([facility]             Activity: Activity as tolerated  Diet: Diabetic Diet  Wound Care: Keep wound clean and dry    Follow-up with ortho as scheduled, endo in next 2-3 weeks  ·     Time spent to discharge patient 32 minutes  Signed:  Ernie Baldwin MD  4/1/2020  9:25 AM

## 2020-04-01 NOTE — PROGRESS NOTES
Care Management Interventions  PCP Verified by CM: Yes  Palliative Care Criteria Met (RRAT>21 & CHF Dx)?: (Risk 11% Dx Diabetic foot infection )  Mode of Transport at Discharge: Other (see comment)  Transition of Care Consult (CM Consult): Other  Discharge Durable Medical Equipment: No(owns lift chair, wheelchair, power chair wheelchair)  Physical Therapy Consult: No  Occupational Therapy Consult: No  Speech Therapy Consult: No  Current Support Network: Lives with Spouse  Confirm Follow Up Transport: Family  The Patient and/or Patient Representative was Provided with a Choice of Provider and Agrees with the Discharge Plan?: Yes  Freedom of Choice List was Provided with Basic Dialogue that Supports the Patient's Individualized Plan of Care/Goals, Treatment Preferences and Shares the Quality Data Associated with the Providers?: Yes  Discharge Location  Discharge Placement: Home    Dylan reviewed chart and talked at length with pt. Pt is a 38 yr old female adm on 3/29 due to diabetic foot infection. Pt had an amputation yesterday and is being discharged today. Pt received a lengthy education session with diabetic educator this am. Dylan discussed her discharge needs. Pt stated that therapist said I needed a walker but I think it would cause more problems. I feel like it would get stuck on the carpet and make me more unbalanced. Pt repeated multiple times that her surgeon did not tell her to use one or to not put her foot down. Dylan attempted multiple times to explain touch toe weight bearing. Dylan also strongly encouraged her to stay off of it completely. Pt stated but I'm not supposed to put my toes down- I am walking on my heal. Pt has multiple other equipment items and rails on her 8 steps up to her bedroom. Pt has two children 10 and 15 and a  who works. Long talk about their property, in laws, their houses/illnesses, and the virus.  Pt was pleasant but stated she had a very bad headache and had been given pain meds which is causing her to be foggy. Pt's  is on the way to get her from [facility]. Pt plans to follow up with surgeon in 1-2 weeks.  Adam Martino

## 2020-04-01 NOTE — DIABETES MGMT
Patient admitted with osteomyelitis s/p right fifth ray amputation of the foot. Blood glucose ranged 164-280 yesterday with patient receiving Lantus 40 units and Humalog 24 units. Blood glucose this morning was 189. Reviewed patient current regimen: Lantus 40 units nightly and Humalog SSI very insulin resistant scale. Updated provider via [employer] regarding patient glycemic control and recommendations would recommend considering increasing basal insulin as fasting blood glucose is not at goal and initiating prandial insulin with a change is SSI to normal sensitivity. Per provider patient may discharge today. No new orders received at this time.

## 2020-04-04 LAB
BACTERIA SPEC CULT: NORMAL
BACTERIA SPEC CULT: NORMAL
SERVICE CMNT-IMP: NORMAL
SERVICE CMNT-IMP: NORMAL

## 2020-05-06 ENCOUNTER — HOSPITAL ENCOUNTER (OUTPATIENT)
Dept: WOUND CARE | Age: 45
Discharge: HOME | End: 2020-05-06
Attending: PODIATRIST
Payer: COMMERCIAL

## 2020-05-06 VITALS
HEIGHT: 67 IN | DIASTOLIC BLOOD PRESSURE: 76 MMHG | HEART RATE: 93 BPM | SYSTOLIC BLOOD PRESSURE: 115 MMHG | WEIGHT: 200 LBS | TEMPERATURE: 97.2 F | OXYGEN SATURATION: 99 % | BODY MASS INDEX: 31.39 KG/M2 | RESPIRATION RATE: 20 BRPM

## 2020-05-06 PROBLEM — E11.621 DIABETIC ULCER OF RIGHT MIDFOOT ASSOCIATED WITH TYPE 2 DIABETES MELLITUS, WITH FAT LAYER EXPOSED (HCC): Status: ACTIVE | Noted: 2020-05-06

## 2020-05-06 PROBLEM — L97.412 DIABETIC ULCER OF RIGHT MIDFOOT ASSOCIATED WITH TYPE 2 DIABETES MELLITUS, WITH FAT LAYER EXPOSED (HCC): Status: ACTIVE | Noted: 2020-05-06

## 2020-05-06 PROCEDURE — 87077 CULTURE AEROBIC IDENTIFY: CPT

## 2020-05-06 PROCEDURE — 87186 SC STD MICRODIL/AGAR DIL: CPT

## 2020-05-06 PROCEDURE — 99213 OFFICE O/P EST LOW 20 MIN: CPT

## 2020-05-06 PROCEDURE — 87205 SMEAR GRAM STAIN: CPT

## 2020-05-06 NOTE — PROGRESS NOTES
Wound Center Progress Note    Patient: Farheen Curtis MRN: [id number]  SSN: [id number]    YOB: 1975  Age: 38 y.o. Sex: female      Subjective:     Chief Complaint:  Farheen Curtis is a 38 y.o. WHITE [place]  female who presents with a wound to the right lower extremity at the distal lateral foot. She had an inflamed callus to the 5th toe in middle 2020 and presented to the ER where she underwent radiographs and was placed on oral augmentin. A week later the \"toe turned black\" and she returned to the ER and was admitted on 3/30/2020. She had a MRI which was positive for OM and underwent 5th ray resection. She has been under the care of Dr. Des Castillo and at her last follow up she requested being sent here. She has been using silvadene cream and epsom salt soaks. She goes barefoot. She has diabetes with last A1c 10.9 but has had her medication changed and spoken with a diabetes educator and states that her daily BG is half what it was. She admits to erythema and edema to the area and drainage. She completed a course of oral keflex last Friday.       History of Present Illness:     Nature: Painless  Location: distal lateral right foot  Duration: 3/31/2020  Onset: Patient states it started as callus  Course:Improving  Aggravating/Alleviating: Worsened with pressure and dependency, improved with compression and rest  Treatment: silvadene, epsom salt soaks    Past Medical History:   Diagnosis Date    Diabetic peripheral neuropathy associated with type 2 diabetes mellitus     Nonproliferative diabetic retinopathy     Type 2 diabetes mellitus, uncontrolled       Past Surgical History:   Procedure Laterality Date    HX  SECTION   and     HX ORTHOPAEDIC      Right 5th Toe Amputated    HX OTHER SURGICAL      cyst removed from neck     Family History   Problem Relation Age of Onset    Diabetes Maternal Grandfather     Cancer Maternal Grandfather         lung (smoker)    Breast Cancer Maternal Grandmother     Crohn's Disease Paternal Grandmother     Cancer Paternal Grandfather         lung (smoker)      Social History     Tobacco Use    Smoking status: Former Smoker     Packs/day: 0.50     Last attempt to quit: 2007     Years since quittin.4    Smokeless tobacco: Never Used   Substance Use Topics    Alcohol use: No     Alcohol/week: 0.0 standard drinks       Prior to Admission medications    Medication Sig Start Date End Date Taking? Authorizing Provider   metFORMIN ER (GLUCOPHAGE XR) 750 mg tablet Take 1 Tab by mouth two (2) times a day. 20   Dejah Lyons MD   escitalopram oxalate (LEXAPRO) 10 mg tablet Take 10 mg by mouth daily. Pt reports she takes as needed. 10/18/19   Provider, Historical   JARDIANCE 25 mg tablet Take 1 Tab by mouth daily. 19   Brigid Smart PA-C   HUMALOG [facility] INSULIN 100 unit/mL kwikpen Take 20 units before each meal plus correction 3/50 >150, max daily dose 100 units 19   Brigid Smart PA-C   FREESTYLE MILIND 14 DAY READER misc Use to check blood glucose 4 times daily E11.19   Brigid Smart PA-C   FREESTYLE MILIND 14 DAY SENSOR kit Use to check blood glucose 4 times daily E11.19   Bob Busch PA-C   TRESIBA FLEXTOUCH U-200 200 unit/mL (3 mL) inpn 80 Units by SubCUTAneous route daily. Patient taking differently: 40 Units by SubCUTAneous route daily. 19   Brigid Smart PA-C   aspirin/acetaminophen/caffeine (EXCEDRIN MIGRAINE PO) Take 1 Tab by mouth as needed. Provider, Historical   atorvastatin (LIPITOR) 20 mg tablet Take 1 Tab by mouth daily. 19   Brigid Smart PA-C   ONE TOUCH DELICA 33 gauge misc Check blood glucose three times daily. Dx E11.65 18   Corina Sutton MD   FRANSISCO PEN NEEDLE 32 gauge x \" ndle 2 injections per day.   Dx E11.65 18   Corina Sutton MD   gabapentin (NEURONTIN) 300 mg capsule Take 1 Cap by mouth two (2) times a day.  Patient taking differently: Take 300 mg by mouth as needed. 11/12/18   Horton, Leonel Spurling, MD     No Known Allergies     Review of Systems:  The patient has no difficulty with chest pain or shortness of breath. No fever or chills. No Nausea, vomiting or diarrhea. Comprehensive review of systems was otherwise unremarkable except as noted above. Lab Results   Component Value Date/Time    Hemoglobin A1c 10.9 03/30/2020 04:59 AM    Hemoglobin A1c (POC) 9.8 11/05/2019 04:00 PM    Hemoglobin A1c, External 12.7 03/16/2016        Immunization History   Administered Date(s) Administered    Pneumococcal Polysaccharide (PPSV-23) 03/19/2016       Body mass index is 31.32 kg/m². Counseling regarding nutrition done: Yes. Recommend Leonid nutritional supplement for wound healing. Current medications:  Current Outpatient Medications   Medication Sig Dispense Refill    metFORMIN ER (GLUCOPHAGE XR) 750 mg tablet Take 1 Tab by mouth two (2) times a day. 60 Tab 2    escitalopram oxalate (LEXAPRO) 10 mg tablet Take 10 mg by mouth daily. Pt reports she takes as needed. 0    JARDIANCE 25 mg tablet Take 1 Tab by mouth daily. 90 Tab 3    HUMALOG KWIKPEN INSULIN 100 unit/mL kwikpen Take 20 units before each meal plus correction 3/50 >150, max daily dose 100 units 30 Pen 3    FREESTYLE MILIND 14 DAY READER misc Use to check blood glucose 4 times daily E11.65 1 Each 0    FREESTYLE MILIND 14 DAY SENSOR kit Use to check blood glucose 4 times daily E11.65 2 Kit 11    TRESIBA FLEXTOUCH U-200 200 unit/mL (3 mL) inpn 80 Units by SubCUTAneous route daily. (Patient taking differently: 40 Units by SubCUTAneous route daily.) 12 Pen 3    aspirin/acetaminophen/caffeine (EXCEDRIN MIGRAINE PO) Take 1 Tab by mouth as needed.  atorvastatin (LIPITOR) 20 mg tablet Take 1 Tab by mouth daily. 30 Tab 11    ONE TOUCH DELICA 33 gauge misc Check blood glucose three times daily.   Dx E11.65 300 Lancet 3    FRANSISCO PEN NEEDLE 32 gauge x 5/32\" ndle 2 injections per day. Dx E11.65 200 Pen Needle 3    gabapentin (NEURONTIN) 300 mg capsule Take 1 Cap by mouth two (2) times a day. (Patient taking differently: Take 300 mg by mouth as needed.) 60 Cap 11         Objective:     Physical Exam:     Visit Vitals  /76 (BP 1 Location: Left arm, BP Patient Position: At rest)   Pulse 93   Temp 97.2 °F (36.2 °C)   Resp 20   Ht 5' 7\" (1.702 m)   Wt 90.7 kg (200 lb)   SpO2 99%   BMI 31.32 kg/m²       General: well developed, well nourished, pleasant , NAD. Hygiene good  Psych: cooperative. Pleasant. No anxiety or depression. Normal mood and affect. HEENT: Normocephalic, atraumatic. EOMI. Conjunctiva clear. No scleral icterus. Neck: Normal range of motion. No masses. Chest: Good air entry bilaterally. Respirations nonlabored  Cardio: Normal heart sounds,no rubs, murmurs or gallops  Abdomen: Soft, nontender, nondistended, normoactive bowel sounds    Vascular: DP and PT pulses are palpable at 2/4 bilateral. Skin temperature is uniform from proximal to distal bilateral. Hair growth is absent bilateral.   Derm: Nails 1-5 bilateral are WNL. No paronychial infections are noted. Skin is atrophic and xerotic. No subcutaneous masses or hyperpigmented lesions are present. Neuro: Epicritic sensation is absent bilateral. Protective sensation is absent with 5.07 SWMF testing to all 10 sites bilateral. Muscle tone and bulk is symmetric bilateral.  Msk: Muscle strength is 5/5 for all prime movers of the foot bilateral. ROM of all joints is full and fluid without pain. No effusions are palpable. Full thickness ulceration is noted to the distal alteral right foot in place of prior 5th toe. Thin hyperkeratotic rim with fibrous base. Focal erythema and heat. No odor. Soft end feel with no bone exposed or palpable. No undermining or sinus track is noted. No fluctuance with palpation.              Ulcer Description:   Wound Foot Right (Active)   Number of days: 36       Wound Foot Right 5th Toe Amputation Site 05/06/20 (Active)   Dressing Type Open to air 5/6/2020 10:41 AM   Non-staged Wound Description Full thickness 5/6/2020 10:41 AM   Wound Length (cm) 1.6 cm 5/6/2020 10:41 AM   Wound Width (cm) 0.9 cm 5/6/2020 10:41 AM   Wound Depth (cm) 0.7 cm 5/6/2020 10:41 AM   Wound Surface Area (cm^2) 1.44 cm^2 5/6/2020 10:41 AM   Wound Volume (cm^3) 1.01 cm^3 5/6/2020 10:41 AM   Condition of [facility] 5/6/2020 10:41 AM   Tissue Type Percent Yellow 100 5/6/2020 10:41 AM   Drainage Amount Small 5/6/2020 10:41 AM   Drainage Color Serous 5/6/2020 10:41 AM   Wound Odor None 5/6/2020 10:41 AM   Kateryna-wound Assessment Intact 5/6/2020 10:41 AM   Number of days: 0             Data Review:   No results found for this or any previous visit (from the past 336 hour(s)). I independently reviewed recent labs, pathology reports, microbiology reports and radiology studies as noted above. Assessment:     38 y.o. female with diabetic ulceration to the right distal lateral foot    Plan:     Patient was examined and evaluated today. They were educated on the barriers to healing. Culture taken due to focal erythema and heat. Will contact her with results. Begin acticoat three times a week. Stop the silvadene cream and soaks. Do not get wet in the shower. I do not recommend barefoot weight bearing with diabetes. A shoe with rubber sole will provide protection and support. She is to continue to work on lowering her A1c. Follow with Dr. Avi James as scheduled. Return in 1 week for serial debridement. Any procedures done today in the wound center are documented in a seperate note in connect care made part of this record by reference. Patient instructed on the following: Follow all wound dressing instructions. Do not get dressing or wound wet. May shower if wound can be effectively kept dry. Cast covers may be purchased at [facility].     Should you experience increased redness, swelling, pain, foul odor, size of wound(s), or have a temperature over 101 degrees please contact the [address] [place] [address] Road at [phone] or if after hours contact your primary care physician or go to the hospital emergency department. Orders Placed This Encounter    CULTURE, WOUND W GRAM STAIN     Right Foot Wound     Standing Status:   Standing     Number of Occurrences:   1    WOUND CARE, DRESSING CHANGE     Right 5th Toe Amputation Site  Cleanse with Normal Saline  Pack Acticoat Flex 3: cut top approximate size of wound, apply to wound bed.    Cover with ABD, Wrap with Rolled Gauze or Bordered Foam  Change 3x/Week    DO NOT GET FOOT WET-PURCHASE CAST COVER AT [facility] OR [facility]    Culture Taken During Appointment on 5.06.2020     Standing Status:   Standing     Number of Occurrences:   1       Follow-up Information     Follow up With Specialties Details Why Contact Info    [address] In 1 week For wound re-check [place] [id number]  [phone]             Signed By: Cathie Perry DPM     May 6, 2020

## 2020-05-06 NOTE — WOUND CARE
[address] [place] [address] [address] Phone: [phone] Fax: [phone] Patient: Ana Pedro MRN: [id number]  SSN: [id number] YOB: 1975  Age: 38 y.o. Sex: female Return Appointment: 1 week with Beverly Stroud DPM 
 
Instructions: Right 5th Toe Amputation Site Cleanse with Normal Saline Pack Acticoat Flex 3: cut top approximate size of wound, apply to wound bed. Cover with ABD, Wrap with Rolled Gauze or Bordered Foam 
Change 3x/Week DO NOT GET FOOT WET-PURCHASE CAST COVER AT [facility] OR [facility] 
 
Culture Taken During Appointment on 5.06.2020 Should you experience increased redness, swelling, pain, foul odor, size of wound(s), or have a temperature over 101 degrees please contact the [address] [place] [address] Road at [phone] or if after hours contact your primary care physician or go to the hospital emergency department. Signed By: Paresh Paulino RN May 6, 2020

## 2020-05-06 NOTE — DISCHARGE INSTRUCTIONS
[address]  [address], [address]  Phone: [phone]  Fax: [phone]    Patient: Thomas Reid MRN: [id number]  SSN: [id number]    YOB: 1975  Age: 38 y.o. Sex: female       Return Appointment: 1 week with Michelle Flores DPM    Instructions: Right 5th Toe Amputation Site  Cleanse with Normal Saline  Pack Acticoat Flex 3: cut top approximate size of wound, apply to wound bed. Cover with ABD, Wrap with Rolled Gauze or Bordered Foam  Change 3x/Week    DO NOT GET FOOT WET-PURCHASE CAST COVER AT [facility] OR [facility]    Culture Taken During Appointment on 5.06.2020    Should you experience increased redness, swelling, pain, foul odor, size of wound(s), or have a temperature over 101 degrees please contact the [address] [place] [address] Road at [phone] or if after hours contact your primary care physician or go to the hospital emergency department.     Signed By: Josh Doll RN     May 6, 2020

## 2020-05-06 NOTE — WOUND CARE
05/06/20 1041 Wound Foot Right 5th Toe Amputation Site 05/06/20 Date First Assessed: 05/06/20   Wound Approximate Age at First Assessment (Weeks): 5 weeks  Primary Wound Type: Open incision/surgical site  Location: Foot  Wound Location Orientation: Right  Wound Description: 5th Toe Amputation Site  Date of First O... Dressing Type Open to air Non-staged Wound Description Full thickness Wound Length (cm) 1.6 cm Wound Width (cm) 0.9 cm Wound Depth (cm) 0.7 cm Wound Surface Area (cm^2) 1.44 cm^2 Wound Volume (cm^3) 1.01 cm^3 Condition of [facility] Tissue Type Percent Yellow 100 Drainage Amount Small Drainage Color Serous Wound Odor None Kateryna-wound Assessment Intact

## 2020-05-11 ENCOUNTER — DOCUMENTATION ONLY (OUTPATIENT)
Dept: WOUND CARE | Age: 45
End: 2020-05-11

## 2020-05-11 LAB
BACTERIA SPEC CULT: ABNORMAL
GRAM STN SPEC: ABNORMAL
SERVICE CMNT-IMP: ABNORMAL

## 2020-05-11 RX ORDER — CIPROFLOXACIN 500 MG/1
500 TABLET ORAL 2 TIMES DAILY
COMMUNITY
Start: 2020-05-11 | End: 2020-05-20

## 2020-05-11 RX ORDER — SULFAMETHOXAZOLE AND TRIMETHOPRIM 800; 160 MG/1; MG/1
1 TABLET ORAL 2 TIMES DAILY
COMMUNITY
Start: 2020-05-11 | End: 2020-05-20

## 2020-05-11 NOTE — PROGRESS NOTES
Orders received from Dr. Jorge Hsu for Cipro 500 mg (1) po BID x 10 days as well as Bactrim DS (1) po BID x 10 days. Prescription called in to [facility].  Message left for Ms. Bello to return call regarding new medications ordered.

## 2020-05-14 ENCOUNTER — HOSPITAL ENCOUNTER (OUTPATIENT)
Dept: WOUND CARE | Age: 45
Discharge: HOME | End: 2020-05-14
Attending: PODIATRIST
Payer: COMMERCIAL

## 2020-05-14 VITALS
WEIGHT: 201 LBS | HEART RATE: 90 BPM | OXYGEN SATURATION: 99 % | TEMPERATURE: 97.8 F | BODY MASS INDEX: 31.55 KG/M2 | DIASTOLIC BLOOD PRESSURE: 74 MMHG | HEIGHT: 67 IN | SYSTOLIC BLOOD PRESSURE: 109 MMHG | RESPIRATION RATE: 18 BRPM

## 2020-05-14 DIAGNOSIS — E11.621 DIABETIC ULCER OF RIGHT MIDFOOT ASSOCIATED WITH TYPE 2 DIABETES MELLITUS, WITH FAT LAYER EXPOSED (HCC): ICD-10-CM

## 2020-05-14 DIAGNOSIS — E11.628 DIABETIC FOOT INFECTION (HCC): ICD-10-CM

## 2020-05-14 DIAGNOSIS — E11.42 DIABETIC PERIPHERAL NEUROPATHY ASSOCIATED WITH TYPE 2 DIABETES MELLITUS (HCC): ICD-10-CM

## 2020-05-14 DIAGNOSIS — L03.031 CELLULITIS OF TOE OF RIGHT FOOT: ICD-10-CM

## 2020-05-14 DIAGNOSIS — L97.412 DIABETIC ULCER OF RIGHT MIDFOOT ASSOCIATED WITH TYPE 2 DIABETES MELLITUS, WITH FAT LAYER EXPOSED (HCC): ICD-10-CM

## 2020-05-14 DIAGNOSIS — L08.9 DIABETIC FOOT INFECTION (HCC): ICD-10-CM

## 2020-05-14 PROCEDURE — 99204 OFFICE O/P NEW MOD 45 MIN: CPT | Performed by: SURGERY

## 2020-05-14 PROCEDURE — 99213 OFFICE O/P EST LOW 20 MIN: CPT

## 2020-05-14 RX ORDER — DOXYCYCLINE 100 MG/1
100 CAPSULE ORAL 2 TIMES DAILY
Qty: 20 CAP | Refills: 0 | Status: SHIPPED | OUTPATIENT
Start: 2020-05-14 | End: 2020-05-24

## 2020-05-14 NOTE — PROGRESS NOTES
Wound Center Progress Note    Patient: Lydia Tapia MRN: [id number]  SSN: [id number]    YOB: 1975  Age: 38 y.o. Sex: female      Subjective:     Chief Complaint:  Lydia Tapia is a 38 y.o. WHITE [place]  female who presents with a wound to the right lower extremity at the distal lateral foot. She had an inflamed callus to the 5th toe in middle March 2020 and presented to the ER where she underwent radiographs and was placed on oral augmentin. A week later the \"toe turned black\" and she returned to the ER and was admitted on 3/30/2020. She had a MRI which was positive for OM and underwent 5th ray resection. She has been under the care of Dr. Marisela Anderson and at her last follow up she requested being sent here. She has been using silvadene cream and epsom salt soaks. She goes barefoot. She has diabetes with last A1c 10.9 but has had her medication changed and spoken with a diabetes educator and states that her daily BG is half what it was. She admits to erythema and edema to the area and drainage. She completed a course of oral keflex last Friday. She returns on 5/14/2020. She is unable to make Wednesday appointments with Dr. July Cazares, so I will see her on Thursdays. She reports that she has been wearing her crocs on her right foot and comes in with an open sandal on her left foot. She does not have diabetic shoes. She was advised not to go barefoot previously. She does not smoke. She was advised about the criticality of a limb threatening event and the possibility of future limb threatening events and the goals of limb salvage. She was strongly advised to practice diabetic foot care and to never go barefoot. Strong advice regarding proper footwear was given. Her wound culture grew E. coli, ESBL Klebsiella, and MRSA, the E. coli and MRSA are sensitive to the Cipro and Bactrim that she was prescribed.   The Klebsiella has few signs of antibiotics and we will need to keep an eye on this wound. No additional debridement was required today. Wound is being dressed with Acticoat. History of Present Illness:     Nature: Painless  Location: distal lateral right foot  Duration: 3/31/2020  Onset: Patient states it started as callus  Course:Improving  Aggravating/Alleviating: Worsened with pressure and dependency, improved with compression and rest  Treatment: silvadene, epsom salt soaks    Past Medical History:   Diagnosis Date    Diabetic peripheral neuropathy associated with type 2 diabetes mellitus     Nonproliferative diabetic retinopathy     Type 2 diabetes mellitus, uncontrolled       Past Surgical History:   Procedure Laterality Date    HX  SECTION   and     HX ORTHOPAEDIC      Right 5th Toe Amputated    HX OTHER SURGICAL      cyst removed from neck     Family History   Problem Relation Age of Onset    Diabetes Maternal Grandfather     Cancer Maternal Grandfather         lung (smoker)    Breast Cancer Maternal Grandmother     Crohn's Disease Paternal Grandmother     Cancer Paternal Grandfather         lung (smoker)      Social History     Tobacco Use    Smoking status: Former Smoker     Packs/day: 0.50     Last attempt to quit: 2007     Years since quittin.4    Smokeless tobacco: Never Used   Substance Use Topics    Alcohol use: No     Alcohol/week: 0.0 standard drinks       Prior to Admission medications    Medication Sig Start Date End Date Taking? Authorizing Provider   ciprofloxacin HCl (Cipro) 500 mg tablet Take 500 mg by mouth two (2) times a day. 20  Provider, Historical   trimethoprim-sulfamethoxazole (Bactrim DS) 160-800 mg per tablet Take 1 Tab by mouth two (2) times a day. 20  Provider, Historical   metFORMIN ER (GLUCOPHAGE XR) 750 mg tablet Take 1 Tab by mouth two (2) times a day. 20   [employer], MD   escitalopram oxalate (LEXAPRO) 10 mg tablet Take 10 mg by mouth daily.  Pt reports she takes as needed. 10/18/19   Provider, Historical   JARDIANCE 25 mg tablet Take 1 Tab by mouth daily. 11/5/19   Koffi Galicia PA-C   [facility] 100 unit/mL kwikpen Take 20 units before each meal plus correction 3/50 >150, max daily dose 100 units 11/5/19   Koffi Galicia PA-C   FREESTYLE MILIND 14 DAY READER misc Use to check blood glucose 4 times daily E11.65 8/5/19   Koffi Galicia PA-C   FREESTYLE MILIND 14 DAY SENSOR kit Use to check blood glucose 4 times daily E11.65 8/5/19   Rocky Busch PA-C   TRESIBA FLEXTOUCH U-200 200 unit/mL (3 mL) inpn 80 Units by SubCUTAneous route daily. Patient taking differently: 40 Units by SubCUTAneous route daily. 8/5/19   Koffi Galicia PA-C   aspirin/acetaminophen/caffeine (EXCEDRIN MIGRAINE PO) Take 1 Tab by mouth as needed. Provider, Historical   atorvastatin (LIPITOR) 20 mg tablet Take 1 Tab by mouth daily. 2/19/19   Koffi Galicia PA-C   ONE TOUCH DELICA 33 gauge misc Check blood glucose three times daily. Dx E11.65 11/12/18   Riki Antonio MD   FRANSISCO PEN NEEDLE 32 gauge x 5/32\" ndle 2 injections per day. Dx E11.65 11/12/18   Riki Antonio MD   gabapentin (NEURONTIN) 300 mg capsule Take 1 Cap by mouth two (2) times a day. Patient taking differently: Take 300 mg by mouth as needed. 11/12/18   Parvin Moreno MD     No Known Allergies     Review of Systems:  The patient has no difficulty with chest pain or shortness of breath. No fever or chills. No Nausea, vomiting or diarrhea. Comprehensive review of systems was otherwise unremarkable except as noted above. Lab Results   Component Value Date/Time    Hemoglobin A1c 10.9 03/30/2020 04:59 AM    Hemoglobin A1c (POC) 9.8 11/05/2019 04:00 PM    Hemoglobin A1c, External 12.7 03/16/2016        Immunization History   Administered Date(s) Administered    Pneumococcal Polysaccharide (PPSV-23) 03/19/2016       Body mass index is 31.48 kg/m².     Counseling regarding nutrition done: Yes. Recommend Leonid nutritional supplement for wound healing. Current medications:  Current Outpatient Medications   Medication Sig Dispense Refill    ciprofloxacin HCl (Cipro) 500 mg tablet Take 500 mg by mouth two (2) times a day.  trimethoprim-sulfamethoxazole (Bactrim DS) 160-800 mg per tablet Take 1 Tab by mouth two (2) times a day.  metFORMIN ER (GLUCOPHAGE XR) 750 mg tablet Take 1 Tab by mouth two (2) times a day. 60 Tab 2    escitalopram oxalate (LEXAPRO) 10 mg tablet Take 10 mg by mouth daily. Pt reports she takes as needed. 0    JARDIANCE 25 mg tablet Take 1 Tab by mouth daily. 90 Tab 3    HUMALOG KWIKPEN INSULIN 100 unit/mL kwikpen Take 20 units before each meal plus correction 3/50 >150, max daily dose 100 units 30 Pen 3    FREESTYLE MILIND 14 DAY READER misc Use to check blood glucose 4 times daily E11.65 1 Each 0    FREESTYLE MILIND 14 DAY SENSOR kit Use to check blood glucose 4 times daily E11.65 2 Kit 11    TRESIBA FLEXTOUCH U-200 200 unit/mL (3 mL) inpn 80 Units by SubCUTAneous route daily. (Patient taking differently: 40 Units by SubCUTAneous route daily.) 12 Pen 3    aspirin/acetaminophen/caffeine (EXCEDRIN MIGRAINE PO) Take 1 Tab by mouth as needed.  atorvastatin (LIPITOR) 20 mg tablet Take 1 Tab by mouth daily. 30 Tab 11    ONE TOUCH DELICA 33 gauge misc Check blood glucose three times daily. Dx E11.65 300 Lancet 3    FRANSISCO PEN NEEDLE 32 gauge x 5/32\" ndle 2 injections per day. Dx E11.65 200 Pen Needle 3    gabapentin (NEURONTIN) 300 mg capsule Take 1 Cap by mouth two (2) times a day. (Patient taking differently: Take 300 mg by mouth as needed.) 60 Cap 11         Objective:     Physical Exam:     Visit Vitals  /74 (BP 1 Location: Left arm)   Pulse 90   Temp 97.8 °F (36.6 °C)   Resp 18   Ht 5' 7\" (1.702 m)   Wt 201 lb (91.2 kg)   SpO2 99%   BMI 31.48 kg/m²       General: well developed, well nourished, pleasant , NAD. Hygiene good  Psych: cooperative. Pleasant. No anxiety or depression. Normal mood and affect. HEENT: Normocephalic, atraumatic. EOMI. Conjunctiva clear. No scleral icterus. Neck: Normal range of motion. No masses. Chest: Good air entry bilaterally. Respirations nonlabored  Cardio: Normal heart sounds,no rubs, murmurs or gallops  Abdomen: Soft, nontender, nondistended, normoactive bowel sounds    Vascular: DP and PT pulses are palpable at 2/4 bilateral. Skin temperature is uniform from proximal to distal bilateral. Hair growth is absent bilateral.   Derm: Nails 1-5 bilateral are WNL. No paronychial infections are noted. Skin is atrophic and xerotic. No subcutaneous masses or hyperpigmented lesions are present. Neuro: Epicritic sensation is absent bilateral. Protective sensation is absent with 5.07 SWMF testing to all 10 sites bilateral. Muscle tone and bulk is symmetric bilateral.  Msk: Muscle strength is 5/5 for all prime movers of the foot bilateral. ROM of all joints is full and fluid without pain. No effusions are palpable. Full thickness ulceration is noted to the distal lateral right foot in place of prior 5th toe. Thin hyperkeratotic rim with fibrous base. Focal erythema and heat. No odor. Soft end feel with no bone exposed or palpable. No undermining or sinus track is noted. No fluctuance with palpation.        Ulcer Description:   Wound Foot Right (Active)   Number of days: 44       Wound Foot Right 5th Toe Amputation Site 05/06/20 (Active)   Dressing Status Clean, dry, and intact 5/14/2020  2:43 PM   Non-staged Wound Description Full thickness 5/14/2020  2:43 PM   Wound Length (cm) 1.5 cm 5/14/2020  2:43 PM   Wound Width (cm) 0.5 cm 5/14/2020  2:43 PM   Wound Depth (cm) 0.5 cm 5/14/2020  2:43 PM   Wound Surface Area (cm^2) 0.75 cm^2 5/14/2020  2:43 PM   Wound Volume (cm^3) 0.38 cm^3 5/14/2020  2:43 PM   Change in Wound Size % 47.92 5/14/2020  2:43 PM   Condition of [facility] 5/14/2020  2:43 PM   Tissue Type Percent Yellow 100 5/14/2020  2:43 PM   Drainage Amount Moderate 5/14/2020  2:43 PM   Drainage Color Serous 5/14/2020  2:43 PM   Wound Odor None 5/14/2020  2:43 PM   Kateryna-wound Assessment Intact 5/6/2020 10:41 AM   Cleansing and Cleansing Agents  Normal saline 5/14/2020  2:43 PM   Dressing Changed Changed/New 5/6/2020 10:41 AM   Number of days: 8               Data Review:   Recent Results (from the past 336 hour(s))   CULTURE, WOUND W GRAM STAIN    Collection Time: 05/06/20 11:21 AM   Result Value Ref Range    Special Requests: NO SPECIAL REQUESTS      GRAM STAIN 0 TO 2 WBC' SEEN PER OIF     GRAM STAIN MODERATE GRAM POSITIVE COCCI      GRAM STAIN FEW GRAM NEGATIVE RODS      Culture result: MODERATE ESCHERICHIA COLI (A)      Culture result: (A)       MODERATE KLEBSIELLA OXYTOCA ** (EXTENDED SPECTRUM BETA LACTAMASE ) **    Culture result: MODERATE STAPHYLOCOCCUS AUREUS (A)      Culture result: ** MULTI-DRUG RESISTANT ORGANISM **      Culture result:        RESULTS VERIFIED, PHONED TO AND READ BACK BY  SUSY RAMOS RN ON 5/11/20 @1120, TA         Susceptibility    Staphylococcus aureus - FARRAH     Trimeth-Sulfamethoxa <=0.5/9.5 Susceptible ug/mL     Tetracycline <=0.5 Susceptible ug/mL     Clindamycin ($) <=0.5 Susceptible ug/mL     Vancomycin ($) 1 Susceptible ug/mL     Cefazolin ($) <=2 Susceptible ug/mL     Oxacillin 0.5 Susceptible ug/mL     Rifampin ($$$$)* <=0.5 Susceptible ug/mL      * Rifampin is not to be used for mono-therapy.     Escherichia coli - FARRAH     Trimeth-Sulfamethoxa <=0.5/9.5 Susceptible ug/mL     Ampicillin ($) <=2 Susceptible ug/mL     Ciprofloxacin ($) <=0.5 Susceptible ug/mL     Gentamicin ($) <=1 Susceptible ug/mL     Tobramycin ($) 1 Susceptible ug/mL     Cefazolin ($) <=1 Susceptible ug/mL     Ceftriaxone ($) <=0.5 Susceptible ug/mL     Cefoxitin <=4 Susceptible ug/mL     Levofloxacin ($) <=1 Susceptible ug/mL     Aztreonam ($$$$) <=1 Susceptible ug/mL     Cefepime ($$) <=0.5 Susceptible ug/mL Piperacillin/Tazobac ($) <=2/4 Susceptible ug/mL     Meropenem ($$) <=0.125 Susceptible ug/mL    Klebsiella oxytoca - FARRAH     Trimeth-Sulfamethoxa >2/38 Resistant ug/mL     Ciprofloxacin ($) 2 Intermediate ug/mL     Amikacin ($) <=4 Susceptible ug/mL     Gentamicin ($) >8 Resistant ug/mL     Tobramycin ($) >8 Resistant ug/mL     Levofloxacin ($) <=1 Susceptible ug/mL     Meropenem ($$) <=0.125 Susceptible ug/mL       I independently reviewed recent labs, pathology reports, microbiology reports and radiology studies as noted above. Assessment:     38 y.o. female with diabetic ulceration to the right distal lateral foot    Plan:     Patient was examined and evaluated today. They were educated on the barriers to healing. Culture taken due to focal erythema and heat. Will contact her with results. Begin acticoat three times a week. Stop the silvadene cream and soaks. Do not get wet in the shower. I do not recommend barefoot weight bearing with diabetes. A shoe with rubber sole will provide protection and support. She is to continue to work on lowering her A1c. Follow with Dr. Ryan Coello as scheduled. She returns to the wound center on 5/14/2020. She is unable to come on Wednesday to see Dr. Liz High, so I will see her on Thursdays. We will continue with Acticoat dressings. She will complete her antibiotic course of Cipro/Bactrim for 4 more days. Her cultures grew E. Coli, ESBL Klebsiella and MRSA. I will see her back in 1 week. Any procedures done today in the wound center are documented in a seperate note in connect care made part of this record by reference. Patient instructed on the following: Follow all wound dressing instructions. Do not get dressing or wound wet. May shower if wound can be effectively kept dry. Cast covers may be purchased at [facility].     Should you experience increased redness, swelling, pain, foul odor, size of wound(s), or have a temperature over 101 degrees please contact the [address] [place] [address] Road at [phone] or if after hours contact your primary care physician or go to the hospital emergency department. Orders Placed This Encounter    WOUND CARE, DRESSING CHANGE     Right 5th Toe Amputation Site  Cleanse with Normal Saline  Pack Acticoat Flex 3: cut top approximate size of wound, apply to wound bed.    Cover with ABD, Wrap with Rolled Gauze or Bordered Foam  Change 3x/Week     DO NOT GET FOOT WET-PURCHASE CAST COVER AT [facility] OR [facility]     Standing Status:   Standing     Number of Occurrences:   1       Follow-up Information     Follow up With Specialties Details Why Contact Info    [address] In 1 week  [address] [facility] [address] [facility] [address] [phone]             Signed By: Fariba Gill MD     May 14, 2020

## 2020-05-14 NOTE — DISCHARGE INSTRUCTIONS
Right 5th Toe Amputation Site  Cleanse with Normal Saline  Pack Acticoat Flex 3: cut top approximate size of wound, apply to wound bed.    Cover with ABD, Wrap with Rolled Gauze or Bordered Foam  Change 3x/Week     DO NOT GET FOOT WET-PURCHASE CAST COVER AT [address] [place] [address]

## 2020-05-14 NOTE — WOUND CARE
05/14/20 1443 Wound Foot Right 5th Toe Amputation Site 05/06/20 Date First Assessed: 05/06/20   Wound Approximate Age at First Assessment (Weeks): 5 weeks  Primary Wound Type: Open incision/surgical site  Location: Foot  Wound Location Orientation: Right  Wound Description: 5th Toe Amputation Site  Date of First O... Dressing Status Clean, dry, and intact Dressing Type  
(acticoat, gauze, tape) Non-staged Wound Description Full thickness Wound Length (cm) 1.5 cm Wound Width (cm) 0.5 cm Wound Depth (cm) 0.5 cm Wound Surface Area (cm^2) 0.75 cm^2 Wound Volume (cm^3) 0.38 cm^3 Change in Wound Size % 47.92 Condition of [facility] Tissue Type Percent Yellow 100 Drainage Amount Moderate Drainage Color Serous Wound Odor None Cleansing and Cleansing Agents  Normal saline

## 2020-05-14 NOTE — WOUND CARE
[address] [place] [address], [facility] [address] Phone: [phone] Fax: [phone] Patient: Chery Kendrick MRN: [id number]  SSN: [id number] YOB: 1975  Age: 38 y.o. Sex: female Return Appointment: 1 week with Shane Beck MD 
 
Instructions: Right 5th Toe Amputation Site Cleanse with Normal Saline Pack Acticoat Flex 3: cut top approximate size of wound, apply to wound bed. Cover with ABD, Wrap with Rolled Gauze or Bordered Foam 
Change 3x/Week 
  
DO NOT GET FOOT WET-PURCHASE CAST COVER AT [facility] OR [facility] 
 
 
 
Should you experience increased redness, swelling, pain, foul odor, size of wound(s), or have a temperature over 101 degrees please contact the [address] [place] [address] Road at [phone] or if after hours contact your primary care physician or go to the hospital emergency department. Signed By: Lia Massey RN May 14, 2020

## 2020-05-14 NOTE — WOUND CARE
[address] May 14, 2020 RE: Lydia Tapia To Whom It May Concern, This is to certify that Lydia Tapia had an appointment at the [address] [facility] on 5/14/2020 at 2:30. Please feel free to contact my office if you have any questions or concerns. Thank you for your assistance in this matter. Sincerely, Aurelia Monsivais RN

## 2020-05-21 ENCOUNTER — HOSPITAL ENCOUNTER (OUTPATIENT)
Dept: WOUND CARE | Age: 45
Discharge: HOME | End: 2020-05-21
Attending: PODIATRIST
Payer: COMMERCIAL

## 2020-05-21 VITALS
DIASTOLIC BLOOD PRESSURE: 76 MMHG | HEART RATE: 94 BPM | SYSTOLIC BLOOD PRESSURE: 112 MMHG | TEMPERATURE: 97.7 F | BODY MASS INDEX: 31.55 KG/M2 | WEIGHT: 201 LBS | HEIGHT: 67 IN

## 2020-05-21 DIAGNOSIS — L03.031 CELLULITIS OF TOE OF RIGHT FOOT: ICD-10-CM

## 2020-05-21 DIAGNOSIS — E11.42 DIABETIC PERIPHERAL NEUROPATHY ASSOCIATED WITH TYPE 2 DIABETES MELLITUS (HCC): ICD-10-CM

## 2020-05-21 DIAGNOSIS — L08.9 DIABETIC FOOT INFECTION (HCC): ICD-10-CM

## 2020-05-21 DIAGNOSIS — E11.621 DIABETIC ULCER OF RIGHT MIDFOOT ASSOCIATED WITH TYPE 2 DIABETES MELLITUS, WITH FAT LAYER EXPOSED (HCC): ICD-10-CM

## 2020-05-21 DIAGNOSIS — L97.412 DIABETIC ULCER OF RIGHT MIDFOOT ASSOCIATED WITH TYPE 2 DIABETES MELLITUS, WITH FAT LAYER EXPOSED (HCC): ICD-10-CM

## 2020-05-21 DIAGNOSIS — E11.628 DIABETIC FOOT INFECTION (HCC): ICD-10-CM

## 2020-05-21 PROCEDURE — 11042 DBRDMT SUBQ TIS 1ST 20SQCM/<: CPT

## 2020-05-21 PROCEDURE — 99214 OFFICE O/P EST MOD 30 MIN: CPT | Performed by: SURGERY

## 2020-05-21 RX ORDER — SILVER NITRATE 38.21; 12.74 MG/1; MG/1
1 STICK TOPICAL ONCE
Status: COMPLETED | OUTPATIENT
Start: 2020-05-21 | End: 2020-05-21

## 2020-05-21 RX ADMIN — SILVER NITRATE 1 APPLICATOR: 38.21; 12.74 STICK TOPICAL at 14:09

## 2020-05-21 NOTE — DISCHARGE INSTRUCTIONS
[address]  [address], [address]  Phone: [phone]  Fax: [phone]    Patient: Svitlana Rowe MRN: [id number]  SSN: [id number]    YOB: 1975  Age: 38 y.o. Sex: female       Return Appointment: 1 week with Sharon Bowling MD    Instructions:  Right 5th Toe Amputation Site  Cleanse with Normal Saline  Pack Acticoat Flex 3: cut top approximate size of wound, apply to wound bed. Cover with ABD, Wrap with Rolled Gauze or Bordered Foam  Change 3x/Week     DO NOT GET FOOT WET-PURCHASE CAST COVER AT [facility] OR [facility]       Should you experience increased redness, swelling, pain, foul odor, size of wound(s), or have a temperature over 101 degrees please contact the [address] [place] [address] Road at [phone] or if after hours contact your primary care physician or go to the hospital emergency department.     Signed By: Keith Coombs RN     May 21, 2020

## 2020-05-21 NOTE — WOUND CARE
05/21/20 1353 Wound Foot Right 5th Toe Amputation Site 05/06/20 Date First Assessed: 05/06/20   Wound Approximate Age at First Assessment (Weeks): 5 weeks  Primary Wound Type: Open incision/surgical site  Location: Foot  Wound Location Orientation: Right  Wound Description: 5th Toe Amputation Site  Date of First O... Dressing Status Clean, dry, and intact Dressing Type  
(acticoat, abd, paper tape) Non-staged Wound Description Full thickness Wound Length (cm) 1.1 cm Wound Width (cm) 0.6 cm Wound Depth (cm) 0.4 cm Wound Surface Area (cm^2) 0.66 cm^2 Wound Volume (cm^3) 0.26 cm^3 Change in Wound Size % 54.17 Condition of Base Pink;Slough Tissue Type Percent Pink 70 Tissue Type Percent Yellow 30 Drainage Amount Small Drainage Color Serous Wound Odor None Kateryna-wound Assessment Intact Cleansing and Cleansing Agents  Normal saline

## 2020-05-21 NOTE — PROCEDURES
Wound Center Debridement    Patient: Farheen Curtis MRN: [id number]  SSN: [id number]    YOB: 1975  Age: 38 y.o. Sex: female      May 21, 2020     Procedure Performed By: Rosalind Escalante MD    Wound: 1 Right  Non Pressure  Toe To Fat Layer    Type of Debridement:  Surgical      Time Out Taken: Yes    Pain Control: Insensate    Level: Skin/Subcutaneous Tissue/Muscle/Fascia    Type of Tissue Removed: Slough and Subcutaneous    Frequency of Debridements: PRN    Consent in chart     Instrument: Curette      Bleeding:  Moderate     Hemostasis: Silver Nitrate      Procedural Pain: Insensate    Post-Procedural Pain: Insensate    Pre-Debridement Measurements: 1.1 x 0.6 x 0.4 cm    Post-Debridement Measurements: 1.1 x 0.7 x 0.7 cm    Surface Area Debrided: 0.66 sq. cm    Response to Procedure: tolerated the procedure well with no complications

## 2020-05-21 NOTE — WOUND CARE
[address] [place] [address], [address] Phone: [phone] Fax: [phone] Patient: Ramin Pettit MRN: [id number]  SSN: [id number] YOB: 1975  Age: 38 y.o. Sex: female Return Appointment: 1 week with Rachael Saul MD 
 
Instructions:  Right 5th Toe Amputation Site Cleanse with Normal Saline Pack Acticoat Flex 3: cut top approximate size of wound, apply to wound bed. Cover with ABD, Wrap with Rolled Gauze or Bordered Foam 
Change 3x/Week 
  
DO NOT GET FOOT WET-PURCHASE CAST COVER AT [facility] OR [facility] 
  
 
Should you experience increased redness, swelling, pain, foul odor, size of wound(s), or have a temperature over 101 degrees please contact the [address] [place] [address] Road at [phone] or if after hours contact your primary care physician or go to the hospital emergency department. Signed By: Leonid Ma RN May 21, 2020

## 2020-05-21 NOTE — PROGRESS NOTES
Wound Center Progress Note    Patient: Melanie Reed MRN: [id number]  SSN: [id number]    YOB: 1975  Age: 38 y.o. Sex: female      Subjective:     Chief Complaint:  Melanie Reed is a 38 y.o. WHITE OR  female who presents with a wound to the right lower extremity at the distal lateral foot. She had an inflamed callus to the 5th toe in middle March 2020 and presented to the ER where she underwent radiographs and was placed on oral augmentin. A week later the \"toe turned black\" and she returned to the ER and was admitted on 3/30/2020. She had a MRI which was positive for OM and underwent 5th ray resection. She has been under the care of Dr. José Miguel Conteh and at her last follow up she requested being sent here. She has been using silvadene cream and epsom salt soaks. She goes barefoot. She has diabetes with last A1c 10.9 but has had her medication changed and spoken with a diabetes educator and states that her daily BG is half what it was. She admits to erythema and edema to the area and drainage. She completed a course of oral keflex last Friday. She returns on 5/14/2020. She is unable to make Wednesday appointments with Dr. Katharine De La Cruz, so I will see her on Thursdays. She reports that she has been wearing her crocs on her right foot and comes in with an open sandal on her left foot. She does not have diabetic shoes. She was advised not to go barefoot previously. She does not smoke. She was advised about the criticality of a limb threatening event and the possibility of future limb threatening events and the goals of limb salvage. She was strongly advised to practice diabetic foot care and to never go barefoot. Strong advice regarding proper footwear was given. Her wound culture grew E. coli, ESBL Klebsiella, and MRSA, the E. coli and MRSA are sensitive to the Cipro and Bactrim that she was prescribed.   The Klebsiella has few signs of antibiotics and we will need to keep an eye on this wound. No additional debridement was required today. Wound is being dressed with Acticoat. She returns on 2020. She has 1 day remaining of antibiotics. She is having no new problems. She is working on getting proper footwear. She has a covered shoe with solid sole on the left foot. Cellulitis has resolved. There is no drainage. Wound was debrided of nonviable tissue. Bleeding was controlled using silver nitrate. Wound is redressed with Acticoat. History of Present Illness:     Nature: Painless  Location: distal lateral right foot  Duration: 3/31/2020  Onset: Patient states it started as callus  Course:Improving  Aggravating/Alleviating: Worsened with pressure and dependency, improved with compression and rest  Treatment: silvadene, epsom salt soaks    Past Medical History:   Diagnosis Date    Diabetic peripheral neuropathy associated with type 2 diabetes mellitus     Nonproliferative diabetic retinopathy     Type 2 diabetes mellitus, uncontrolled       Past Surgical History:   Procedure Laterality Date    HX  SECTION   and     HX ORTHOPAEDIC      Right 5th Toe Amputated    HX OTHER SURGICAL      cyst removed from neck     Family History   Problem Relation Age of Onset    Diabetes Maternal Grandfather     Cancer Maternal Grandfather         lung (smoker)    Breast Cancer Maternal Grandmother     Crohn's Disease Paternal Grandmother     Cancer Paternal Grandfather         lung (smoker)      Social History     Tobacco Use    Smoking status: Former Smoker     Packs/day: 0.50     Last attempt to quit: 2007     Years since quittin.4    Smokeless tobacco: Never Used   Substance Use Topics    Alcohol use: No     Alcohol/week: 0.0 standard drinks       Prior to Admission medications    Medication Sig Start Date End Date Taking? Authorizing Provider   metFORMIN ER (GLUCOPHAGE XR) 750 mg tablet Take 1 Tab by mouth two (2) times a day.  20  Yes Serena Azeem Sargent MD   JARDIANCE 25 mg tablet Take 1 Tab by mouth daily. 11/5/19  Yes [facility] 100 unit/mL kwikpen Take 20 units before each meal plus correction 3/50 >150, max daily dose 100 units 11/5/19  Yes Demetrio Busch PA-C   FREESTYLE MILIND 14 DAY READER misc Use to check blood glucose 4 times daily E11.65 8/5/19  Yes Demetrio Busch PA-C   FREESTYLE MILIND 14 DAY SENSOR kit Use to check blood glucose 4 times daily E11.65 8/5/19  Yes Demetrio Busch PA-C   TRESIBA FLEXTOUCH U-200 200 unit/mL (3 mL) inpn 80 Units by SubCUTAneous route daily. Patient taking differently: 40 Units by SubCUTAneous route daily. 8/5/19  Yes Demetrio Busch PA-C   aspirin/acetaminophen/caffeine (EXCEDRIN MIGRAINE PO) Take 1 Tab by mouth as needed. Yes Provider, Historical   atorvastatin (LIPITOR) 20 mg tablet Take 1 Tab by mouth daily. 2/19/19  Yes Demetrio Busch PA-C   ONE TOUCH DELICA 33 gauge misc Check blood glucose three times daily. Dx E11.65 11/12/18  Yes Aleta Moreno MD   FRANSISCO PEN NEEDLE 32 gauge x 5/32\" ndle 2 injections per day. Dx E11.65 11/12/18  Yes Aleta Moreno MD   gabapentin (NEURONTIN) 300 mg capsule Take 1 Cap by mouth two (2) times a day. Patient taking differently: Take 300 mg by mouth as needed. 11/12/18  Yes Aleta Moreno MD   doxycycline (MONODOX) 100 mg capsule Take 1 Cap by mouth two (2) times a day for 10 days. 5/14/20 5/24/20  Justino Denton MD   ciprofloxacin HCl (Cipro) 500 mg tablet Take 500 mg by mouth two (2) times a day. 5/11/20 5/20/20  Provider, Historical   trimethoprim-sulfamethoxazole (Bactrim DS) 160-800 mg per tablet Take 1 Tab by mouth two (2) times a day. 5/11/20 5/20/20  Provider, Historical   escitalopram oxalate (LEXAPRO) 10 mg tablet Take 10 mg by mouth daily. Pt reports she takes as needed.  10/18/19   Provider, Historical     No Known Allergies     Review of Systems:  The patient has no difficulty with chest pain or shortness of breath. No fever or chills. No Nausea, vomiting or diarrhea. Comprehensive review of systems was otherwise unremarkable except as noted above. Lab Results   Component Value Date/Time    Hemoglobin A1c 10.9 03/30/2020 04:59 AM    Hemoglobin A1c (POC) 9.8 11/05/2019 04:00 PM    Hemoglobin A1c, External 12.7 03/16/2016        Immunization History   Administered Date(s) Administered    Pneumococcal Polysaccharide (PPSV-23) 03/19/2016       Body mass index is 31.48 kg/m². Counseling regarding nutrition done: Yes. Recommend Leonid nutritional supplement for wound healing. Current medications:  Current Outpatient Medications   Medication Sig Dispense Refill    metFORMIN ER (GLUCOPHAGE XR) 750 mg tablet Take 1 Tab by mouth two (2) times a day. 60 Tab 2    JARDIANCE 25 mg tablet Take 1 Tab by mouth daily. 90 Tab 3    HUMALOG KWIKPEN INSULIN 100 unit/mL kwikpen Take 20 units before each meal plus correction 3/50 >150, max daily dose 100 units 30 Pen 3    FREESTYLE MILIND 14 DAY READER misc Use to check blood glucose 4 times daily E11.65 1 Each 0    FREESTYLE MILIND 14 DAY SENSOR kit Use to check blood glucose 4 times daily E11.65 2 Kit 11    TRESIBA FLEXTOUCH U-200 200 unit/mL (3 mL) inpn 80 Units by SubCUTAneous route daily. (Patient taking differently: 40 Units by SubCUTAneous route daily.) 12 Pen 3    aspirin/acetaminophen/caffeine (EXCEDRIN MIGRAINE PO) Take 1 Tab by mouth as needed.  atorvastatin (LIPITOR) 20 mg tablet Take 1 Tab by mouth daily. 30 Tab 11    ONE TOUCH DELICA 33 gauge misc Check blood glucose three times daily. Dx E11.65 300 Lancet 3    FRANSISCO PEN NEEDLE 32 gauge x 5/32\" ndle 2 injections per day. Dx E11.65 200 Pen Needle 3    gabapentin (NEURONTIN) 300 mg capsule Take 1 Cap by mouth two (2) times a day.  (Patient taking differently: Take 300 mg by mouth as needed.) 60 Cap 11    doxycycline (MONODOX) 100 mg capsule Take 1 Cap by mouth two (2) times a day for 10 days. 20 Cap 0    escitalopram oxalate (LEXAPRO) 10 mg tablet Take 10 mg by mouth daily. Pt reports she takes as needed. 0         Objective:     Physical Exam:     Visit Vitals  /76 (BP 1 Location: Left arm, BP Patient Position: Sitting)   Pulse 94   Temp 97.7 °F (36.5 °C)   Ht 5' 7\" (1.702 m)   Wt 201 lb (91.2 kg)   BMI 31.48 kg/m²       General: well developed, well nourished, pleasant , NAD. Hygiene good  Psych: cooperative. Pleasant. No anxiety or depression. Normal mood and affect. HEENT: Normocephalic, atraumatic. EOMI. Conjunctiva clear. No scleral icterus. Neck: Normal range of motion. No masses. Chest: Good air entry bilaterally. Respirations nonlabored  Cardio: Normal heart sounds,no rubs, murmurs or gallops  Abdomen: Soft, nontender, nondistended, normoactive bowel sounds    Vascular: DP and PT pulses are palpable at 2/4 bilateral. Skin temperature is uniform from proximal to distal bilateral. Hair growth is absent bilateral.   Derm: Nails 1-5 bilateral are WNL. No paronychial infections are noted. Skin is atrophic and xerotic. No subcutaneous masses or hyperpigmented lesions are present. Neuro: Epicritic sensation is absent bilateral. Protective sensation is absent with 5.07 SWMF testing to all 10 sites bilateral. Muscle tone and bulk is symmetric bilateral.  Msk: Muscle strength is 5/5 for all prime movers of the foot bilateral. ROM of all joints is full and fluid without pain. No effusions are palpable. Full thickness ulceration is noted to the distal lateral right foot in place of prior 5th toe. Thin hyperkeratotic rim with fibrous base. Focal erythema and heat. No odor. Soft end feel with no bone exposed or palpable. No undermining or sinus track is noted. No fluctuance with palpation. This has improved dramatically from initial visit.       Ulcer Description:   Wound Foot Right (Active)   Number of days: 51       Wound Foot Right 5th Toe Amputation Site 05/06/20 (Active) Dressing Status Clean, dry, and intact 5/21/2020  1:53 PM   Non-staged Wound Description Full thickness 5/21/2020  1:53 PM   Wound Length (cm) 1.1 cm 5/21/2020  1:53 PM   Wound Width (cm) 0.6 cm 5/21/2020  1:53 PM   Wound Depth (cm) 0.4 cm 5/21/2020  1:53 PM   Wound Surface Area (cm^2) 0.66 cm^2 5/21/2020  1:53 PM   Wound Volume (cm^3) 0.26 cm^3 5/21/2020  1:53 PM   Change in Wound Size % 54.17 5/21/2020  1:53 PM   Condition of Base Globe;Slough 5/21/2020  1:53 PM   Tissue Type Percent Pink 70 5/21/2020  1:53 PM   Tissue Type Percent Yellow 30 5/21/2020  1:53 PM   Drainage Amount Small 5/21/2020  1:53 PM   Drainage Color Serous 5/21/2020  1:53 PM   Wound Odor None 5/21/2020  1:53 PM   Kateryna-wound Assessment Intact 5/21/2020  1:53 PM   Cleansing and Cleansing Agents  Normal saline 5/21/2020  1:53 PM   Dressing Changed Changed/New 5/21/2020  1:53 PM   Number of days: 15                         Data Review:   No results found for this or any previous visit (from the past 336 hour(s)). I independently reviewed recent labs, pathology reports, microbiology reports and radiology studies as noted above. Assessment:     38 y.o. female with diabetic ulceration to the right distal lateral foot    Plan:     Patient was examined and evaluated today. They were educated on the barriers to healing. Culture taken due to focal erythema and heat. Will contact her with results. Begin acticoat three times a week. Stop the silvadene cream and soaks. Do not get wet in the shower. I do not recommend barefoot weight bearing with diabetes. A shoe with rubber sole will provide protection and support. She is to continue to work on lowering her A1c. Follow with Dr. José Miguel Conteh as scheduled. She returns to the wound center on 5/14/2020. She is unable to come on Wednesday to see Dr. Katharine De La Cruz, so I will see her on Thursdays. We will continue with Acticoat dressings.   She will complete her antibiotic course of Cipro/Bactrim for 4 more days.  Her cultures grew E. Coli, ESBL Klebsiella and MRSA. She returns on 5/21/2020. She has 1 day remaining of antibiotics. Cellulitis has resolved. Wound is improved. She is working on obtaining proper footwear. I will see her back in 1 week. Any procedures done today in the wound center are documented in a seperate note in connect care made part of this record by reference. Patient instructed on the following: Follow all wound dressing instructions. Do not get dressing or wound wet. May shower if wound can be effectively kept dry. Cast covers may be purchased at [facility] and [facility]. Should you experience increased redness, swelling, pain, foul odor, size of wound(s), or have a temperature over 101 degrees please contact the [address] [place] [address] Road at [phone] or if after hours contact your primary care physician or go to the hospital emergency department. Orders Placed This Encounter    WOUND CARE, DRESSING CHANGE     Right 5th Toe Amputation Site  Cleanse with Normal Saline  Pack Acticoat Flex 3: cut top approximate size of wound, apply to wound bed.    Cover with ABD, Wrap with Rolled Gauze or Bordered Foam  Change 3x/Week     DO NOT GET FOOT WET-PURCHASE CAST COVER AT [facility] OR [facility]        Standing Status:   Standing     Number of Occurrences:   1    silver nitrate applicators (ARZOL) 1 Applicator       Follow-up Information     Follow up With Specialties Details Why Contact Info    [address] In 1 week  [place] [id number] [phone]             Signed By: Helder Flood MD     May 21, 2020

## 2020-05-28 ENCOUNTER — HOSPITAL ENCOUNTER (OUTPATIENT)
Dept: WOUND CARE | Age: 45
Discharge: HOME | End: 2020-05-28
Attending: PODIATRIST
Payer: COMMERCIAL

## 2020-05-28 VITALS
OXYGEN SATURATION: 99 % | SYSTOLIC BLOOD PRESSURE: 119 MMHG | DIASTOLIC BLOOD PRESSURE: 86 MMHG | BODY MASS INDEX: 31.86 KG/M2 | WEIGHT: 203 LBS | TEMPERATURE: 96.7 F | HEIGHT: 67 IN | RESPIRATION RATE: 18 BRPM | HEART RATE: 89 BPM

## 2020-05-28 DIAGNOSIS — E11.42 DIABETIC PERIPHERAL NEUROPATHY ASSOCIATED WITH TYPE 2 DIABETES MELLITUS (HCC): ICD-10-CM

## 2020-05-28 DIAGNOSIS — L97.412 DIABETIC ULCER OF RIGHT MIDFOOT ASSOCIATED WITH TYPE 2 DIABETES MELLITUS, WITH FAT LAYER EXPOSED (HCC): ICD-10-CM

## 2020-05-28 DIAGNOSIS — E11.628 DIABETIC FOOT INFECTION (HCC): ICD-10-CM

## 2020-05-28 DIAGNOSIS — E11.621 DIABETIC ULCER OF RIGHT MIDFOOT ASSOCIATED WITH TYPE 2 DIABETES MELLITUS, WITH FAT LAYER EXPOSED (HCC): ICD-10-CM

## 2020-05-28 DIAGNOSIS — L08.9 DIABETIC FOOT INFECTION (HCC): ICD-10-CM

## 2020-05-28 PROCEDURE — 99214 OFFICE O/P EST MOD 30 MIN: CPT | Performed by: SURGERY

## 2020-05-28 PROCEDURE — 99213 OFFICE O/P EST LOW 20 MIN: CPT

## 2020-05-28 NOTE — WOUND CARE
[address] [place] [address] [address] Phone: [phone] Fax: [phone] Patient: Juliane Beregr MRN: [id number]  SSN: [id number] YOB: 1975  Age: 38 y.o. Sex: female Return Appointment: 2 weeks with Yong Bundy MD 
 
Instructions: Right 5th Toe Amputation Site Cleanse with Normal Saline Pack Puracol Plus Ag (Collagen with silver): cut top approximate size of wound, apply to wound bed. Cover with ABD, Wrap with Rolled Gauze or Bordered Foam 
Change 3x/Week 
  
DO NOT GET FOOT WET-PURCHASE CAST COVER AT [facility] OR [facility] 
 
Should you experience increased redness, swelling, pain, foul odor, size of wound(s), or have a temperature over 101 degrees please contact the [address] [place] [address] Road at [phone] or if after hours contact your primary care physician or go to the hospital emergency department. Signed By: Christ Grady PT, [facility] May 28, 2020

## 2020-05-28 NOTE — PROGRESS NOTES
05/28/20 1614   Wound Foot Right 5th Toe Amputation Site 05/06/20   Date First Assessed: 05/06/20   Wound Approximate Age at First Assessment (Weeks): 5 weeks  Primary Wound Type: Open incision/surgical site  Location: Foot  Wound Location Orientation: Right  Wound Description: 5th Toe Amputation Site  Date of First O...    Dressing Status Clean, dry, and intact   Dressing Type   (Acticoat, ABD, rolled gauze)   Non-staged Wound Description Full thickness   Wound Length (cm) 1 cm   Wound Width (cm) 0.7 cm   Wound Depth (cm) 0.5 cm   Wound Surface Area (cm^2) 0.7 cm^2   Wound Volume (cm^3) 0.35 cm^3   Change in Wound Size % 51.39   Condition of Base Granulation   Tissue Type Percent Red 100   Drainage Amount Small   Drainage Color Serosanguinous   Wound Odor None   Cleansing and Cleansing Agents  Normal saline   Dressing Type Applied   (Puracol Plus Ag, ABD, rolled gauze)   Procedure Tolerated Well

## 2020-05-28 NOTE — PROGRESS NOTES
Wound Center Progress Note    Patient: Amari Posadas MRN: [id number]  SSN: [id number]    YOB: 1975  Age: 38 y.o. Sex: female      Subjective:     Chief Complaint:  Amari Posadas is a 38 y.o. WHITE OR  female who presents with a wound to the right lower extremity at the distal lateral foot. She had an inflamed callus to the 5th toe in middle March 2020 and presented to the ER where she underwent radiographs and was placed on oral augmentin. A week later the \"toe turned black\" and she returned to the ER and was admitted on 3/30/2020. She had a MRI which was positive for OM and underwent 5th ray resection. She has been under the care of Dr. Sawyer Dominguez and at her last follow up she requested being sent here. She has been using silvadene cream and epsom salt soaks. She goes barefoot. She has diabetes with last A1c 10.9 but has had her medication changed and spoken with a diabetes educator and states that her daily BG is half what it was. She admits to erythema and edema to the area and drainage. She completed a course of oral keflex last Friday. She returns on 5/14/2020. She is unable to make Wednesday appointments with Dr. Abby Hodgson, so I will see her on Thursdays. She reports that she has been wearing her crocs on her right foot and comes in with an open sandal on her left foot. She does not have diabetic shoes. She was advised not to go barefoot previously. She does not smoke. She was advised about the criticality of a limb threatening event and the possibility of future limb threatening events and the goals of limb salvage. She was strongly advised to practice diabetic foot care and to never go barefoot. Strong advice regarding proper footwear was given. Her wound culture grew E. coli, ESBL Klebsiella, and MRSA, the E. coli and MRSA are sensitive to the Cipro and Bactrim that she was prescribed.   The Klebsiella has few signs of antibiotics and we will need to keep an eye on this wound. No additional debridement was required today. Wound is being dressed with Acticoat. She returns on 2020. She has 1 day remaining of antibiotics. She is having no new problems. She is working on getting proper footwear. She has a covered shoe with solid sole on the left foot. Cellulitis has resolved. There is no drainage. Wound was debrided of nonviable tissue. Bleeding was controlled using silver nitrate. Wound is redressed with Acticoat. She returns on 2020. She is off antibiotics. She is not having any problems. She has much better footwear on today. Wound did not require debridement today. Wound dressing will be changed to Farzana.     History of Present Illness:     Nature: Painless  Location: distal lateral right foot  Duration: 3/31/2020  Onset: Patient states it started as callus  Course:Improving  Aggravating/Alleviating: Worsened with pressure and dependency, improved with compression and rest  Treatment: silvadene, epsom salt soaks    Past Medical History:   Diagnosis Date    Diabetic peripheral neuropathy associated with type 2 diabetes mellitus     Nonproliferative diabetic retinopathy     Type 2 diabetes mellitus, uncontrolled       Past Surgical History:   Procedure Laterality Date    HX  SECTION   and     HX ORTHOPAEDIC      Right 5th Toe Amputated    HX OTHER SURGICAL      cyst removed from neck     Family History   Problem Relation Age of Onset    Diabetes Maternal Grandfather     Cancer Maternal Grandfather         lung (smoker)    Breast Cancer Maternal Grandmother     Crohn's Disease Paternal Grandmother     Cancer Paternal Grandfather         lung (smoker)      Social History     Tobacco Use    Smoking status: Former Smoker     Packs/day: 0.50     Last attempt to quit: 2007     Years since quittin.4    Smokeless tobacco: Never Used   Substance Use Topics    Alcohol use: No     Alcohol/week: 0.0 standard drinks       Prior to Admission medications    Medication Sig Start Date End Date Taking? Authorizing Provider   metFORMIN ER (GLUCOPHAGE XR) 750 mg tablet Take 1 Tab by mouth two (2) times a day. 4/1/20   Angeli Lyons MD   escitalopram oxalate (LEXAPRO) 10 mg tablet Take 10 mg by mouth daily. Pt reports she takes as needed. 10/18/19   Provider, Historical   JARDIANCE 25 mg tablet Take 1 Tab by mouth daily. 11/5/19   Lion Hu PA-C   HUMALOG [facility] INSULIN 100 unit/mL kwikpen Take 20 units before each meal plus correction 3/50 >150, max daily dose 100 units 11/5/19   Lion Hu PA-C   FREESTYLE MILIND 14 DAY READER misc Use to check blood glucose 4 times daily E11.65 8/5/19   Lion Hu PA-C   FREESTYLE MILIND 14 DAY SENSOR kit Use to check blood glucose 4 times daily E11.65 8/5/19   Danielle Busch PA-C   TRESIBA FLEXTOUCH U-200 200 unit/mL (3 mL) inpn 80 Units by SubCUTAneous route daily. Patient taking differently: 40 Units by SubCUTAneous route daily. 8/5/19   Lion Hu PA-C   aspirin/acetaminophen/caffeine (EXCEDRIN MIGRAINE PO) Take 1 Tab by mouth as needed. Provider, Historical   atorvastatin (LIPITOR) 20 mg tablet Take 1 Tab by mouth daily. 2/19/19   Lion Hu PA-C   ONE TOUCH DELICA 33 gauge misc Check blood glucose three times daily. Dx E11.65 11/12/18   Karli Enrique MD   FRANSISCO PEN NEEDLE 32 gauge x 5/32\" ndle 2 injections per day. Dx E11.65 11/12/18   Karli Enrique MD   gabapentin (NEURONTIN) 300 mg capsule Take 1 Cap by mouth two (2) times a day. Patient taking differently: Take 300 mg by mouth as needed. 11/12/18   April Moreno MD     No Known Allergies     Review of Systems:  The patient has no difficulty with chest pain or shortness of breath. No fever or chills. No Nausea, vomiting or diarrhea. Comprehensive review of systems was otherwise unremarkable except as noted above.      Lab Results   Component Value Date/Time Hemoglobin A1c 10.9 03/30/2020 04:59 AM    Hemoglobin A1c (POC) 9.8 11/05/2019 04:00 PM    Hemoglobin A1c, External 12.7 03/16/2016        Immunization History   Administered Date(s) Administered    Pneumococcal Polysaccharide (PPSV-23) 03/19/2016       Body mass index is 31.79 kg/m². Counseling regarding nutrition done: Yes. Recommend Leonid nutritional supplement for wound healing. Current medications:  Current Outpatient Medications   Medication Sig Dispense Refill    metFORMIN ER (GLUCOPHAGE XR) 750 mg tablet Take 1 Tab by mouth two (2) times a day. 60 Tab 2    escitalopram oxalate (LEXAPRO) 10 mg tablet Take 10 mg by mouth daily. Pt reports she takes as needed. 0    JARDIANCE 25 mg tablet Take 1 Tab by mouth daily. 90 Tab 3    HUMALOG KWIKPEN INSULIN 100 unit/mL kwikpen Take 20 units before each meal plus correction 3/50 >150, max daily dose 100 units 30 Pen 3    FREESTYLE MILIND 14 DAY READER misc Use to check blood glucose 4 times daily E11.65 1 Each 0    FREESTYLE MILIND 14 DAY SENSOR kit Use to check blood glucose 4 times daily E11.65 2 Kit 11    TRESIBA FLEXTOUCH U-200 200 unit/mL (3 mL) inpn 80 Units by SubCUTAneous route daily. (Patient taking differently: 40 Units by SubCUTAneous route daily.) 12 Pen 3    aspirin/acetaminophen/caffeine (EXCEDRIN MIGRAINE PO) Take 1 Tab by mouth as needed.  atorvastatin (LIPITOR) 20 mg tablet Take 1 Tab by mouth daily. 30 Tab 11    ONE TOUCH DELICA 33 gauge misc Check blood glucose three times daily. Dx E11.65 300 Lancet 3    FRANSISCO PEN NEEDLE 32 gauge x 5/32\" ndle 2 injections per day. Dx E11.65 200 Pen Needle 3    gabapentin (NEURONTIN) 300 mg capsule Take 1 Cap by mouth two (2) times a day.  (Patient taking differently: Take 300 mg by mouth as needed.) 60 Cap 11         Objective:     Physical Exam:     Visit Vitals  /86 (BP 1 Location: Left arm, BP Patient Position: At rest)   Pulse 89   Temp (!) 96.7 °F (35.9 °C)   Resp 18   Ht 5' 7\" (1.702 m)   Wt 203 lb (92.1 kg)   SpO2 99%   BMI 31.79 kg/m²       General: well developed, well nourished, pleasant , NAD. Hygiene good  Psych: cooperative. Pleasant. No anxiety or depression. Normal mood and affect. HEENT: Normocephalic, atraumatic. EOMI. Conjunctiva clear. No scleral icterus. Neck: Normal range of motion. No masses. Chest: Good air entry bilaterally. Respirations nonlabored  Cardio: Normal heart sounds,no rubs, murmurs or gallops  Abdomen: Soft, nontender, nondistended, normoactive bowel sounds    Vascular: DP and PT pulses are palpable at 2/4 bilateral. Skin temperature is uniform from proximal to distal bilateral. Hair growth is absent bilateral.   Derm: Nails 1-5 bilateral are WNL. No paronychial infections are noted. Skin is atrophic and xerotic. No subcutaneous masses or hyperpigmented lesions are present. Neuro: Epicritic sensation is absent bilateral. Protective sensation is absent with 5.07 SWMF testing to all 10 sites bilateral. Muscle tone and bulk is symmetric bilateral.  Msk: Muscle strength is 5/5 for all prime movers of the foot bilateral. ROM of all joints is full and fluid without pain. No effusions are palpable. Full thickness ulceration is noted to the distal lateral right foot in place of prior 5th toe. Thin hyperkeratotic rim with fibrous base. Focal erythema and heat. No odor. Soft end feel with no bone exposed or palpable. No undermining or sinus track is noted. No fluctuance with palpation. This has improved dramatically from initial visit.       Ulcer Description:   Wound Foot Right (Active)   Number of days: 58       Wound Foot Right 5th Toe Amputation Site 05/06/20 (Active)   Dressing Status Clean, dry, and intact 5/28/2020  4:14 PM   Non-staged Wound Description Full thickness 5/28/2020  4:14 PM   Wound Length (cm) 1 cm 5/28/2020  4:14 PM   Wound Width (cm) 0.7 cm 5/28/2020  4:14 PM   Wound Depth (cm) 0.5 cm 5/28/2020  4:14 PM   Wound Surface Area (cm^2) 0.7 cm^2 5/28/2020  4:14 PM   Wound Volume (cm^3) 0.35 cm^3 5/28/2020  4:14 PM   Change in Wound Size % 51.39 5/28/2020  4:14 PM   Condition of Base Granulation 5/28/2020  4:14 PM   Tissue Type Percent Pink 70 5/21/2020  1:53 PM   Tissue Type Percent Red 100 5/28/2020  4:14 PM   Tissue Type Percent Yellow 30 5/21/2020  1:53 PM   Drainage Amount Small 5/28/2020  4:14 PM   Drainage Color Serosanguinous 5/28/2020  4:14 PM   Wound Odor None 5/28/2020  4:14 PM   Kateryna-wound Assessment Intact 5/21/2020  1:53 PM   Cleansing and Cleansing Agents  Normal saline 5/28/2020  4:14 PM   Dressing Changed Changed/New 5/21/2020  1:53 PM   Procedure Tolerated Well 5/28/2020  4:14 PM   Number of days: 22                               Data Review:   No results found for this or any previous visit (from the past 336 hour(s)). I independently reviewed recent labs, pathology reports, microbiology reports and radiology studies as noted above. Assessment:     38 y.o. female with diabetic ulceration to the right distal lateral foot    Plan:     Patient was examined and evaluated today. They were educated on the barriers to healing. Culture taken due to focal erythema and heat. Will contact her with results. Begin acticoat three times a week. Stop the silvadene cream and soaks. Do not get wet in the shower. I do not recommend barefoot weight bearing with diabetes. A shoe with rubber sole will provide protection and support. She is to continue to work on lowering her A1c. Follow with Dr. Verlan Schaumann as scheduled. She returns to the wound center on 5/14/2020. She is unable to come on Wednesday to see Dr. Lizzie Salinas, so I will see her on Thursdays. We will continue with Acticoat dressings. She will complete her antibiotic course of Cipro/Bactrim for 4 more days. Her cultures grew E. Coli, ESBL Klebsiella and MRSA. She returns on 5/21/2020. She has 1 day remaining of antibiotics. Cellulitis has resolved. Wound is improved.   She is working on obtaining proper footwear. I will see her back in 1 week. She returns on 5/28/2020. She is doing better. She is off antibiotics. There is no evidence of active infection. Wound base is clean. Footwear is improved and appropriate. I will see her back in 2 weeks. Any procedures done today in the wound center are documented in a seperate note in connect care made part of this record by reference. Patient instructed on the following: Follow all wound dressing instructions. Do not get dressing or wound wet. May shower if wound can be effectively kept dry. Cast covers may be purchased at [facility] and [facility]. Should you experience increased redness, swelling, pain, foul odor, size of wound(s), or have a temperature over 101 degrees please contact the [address] [place] [address] Road at [phone] or if after hours contact your primary care physician or go to the hospital emergency department. Orders Placed This Encounter    WOUND CARE, DRESSING CHANGE     Right 5th Toe Amputation Site  Cleanse with Normal Saline  Pack Puracol Plus Ag (Collagen with silver): cut top approximate size of wound, apply to wound bed.    Cover with ABD, Wrap with Rolled Gauze or Bordered Foam  Change 3x/Week     DO NOT GET FOOT WET-PURCHASE CAST COVER AT [facility] OR [facility]     Standing Status:   Standing     Number of Occurrences:   1       Follow-up Information     Follow up With Specialties Details Why Contact Info    [address] In 2 weeks For wound re-check [facility] [address] [place] [id number]  [phone]             Signed By: Lucius Cervantes MD     May 28, 2020

## 2020-05-28 NOTE — DISCHARGE INSTRUCTIONS
[address]  [address], [address]  Phone: [phone]  Fax: [phone]    Patient: Efren Brown MRN: [id number]  SSN: [id number]    YOB: 1975  Age: 38 y.o. Sex: female       Return Appointment: 2 weeks with Fozia Yarbrough MD    Instructions: Right 5th Toe Amputation Site  Cleanse with Normal Saline  Pack Puracol Plus Ag (Collagen with silver): cut top approximate size of wound, apply to wound bed. Cover with ABD, Wrap with Rolled Gauze or Bordered Foam  Change 3x/Week     DO NOT GET FOOT WET-PURCHASE CAST COVER AT [facility] OR [facility]    Should you experience increased redness, swelling, pain, foul odor, size of wound(s), or have a temperature over 101 degrees please contact the [address] [place] [address] Road at [phone] or if after hours contact your primary care physician or go to the hospital emergency department.     Signed By: Braden Abrams, PT, [address] [facility]     May 28, 2020

## 2020-06-09 NOTE — THERAPY DISCHARGE
Babak Umanzor  : 1975  Primary: [facility]  Secondary:  [address] at . [address]  [address], [address]  [phone]   [phone]      Treatment Diagnosis:   Difficulty in walking, Not elsewhere classified (R26.2)  Postural imbalance [R29.3]  Foot drop, left foot [M21.372]     Precautions/Allergies:   Patient has no known allergies.      MD Orders: evaluate and treat MEDICAL/REFERRING DIAGNOSIS:  Postural imbalance [R29.3]  Foot drop, left foot [M21.372]   DATE OF ONSET: 3 years ago, progressively worsening  REFERRING PHYSICIAN: Edie Hathaway MD  RETURN PHYSICIAN APPOINTMENT: not specified          Babak Umanzor has been seen in physical therapy from 19 to 19 for 6 visits. At the time of last visit, pt was making steady progress toward goals with improved gait, balance and functional strength. She was independent with an HEP focusing on these aspects and was headed out of town for a few weeks. Pt stated that she would contact MD for new referral if she needed more therapy. Treatment has been discontinued at this time due to Expiration of plan of care without further referral by ordering physician and patient failing to return for additional treatment. Her chart is discharged from outpatient therapy.      Thank you for this referral.     Abena Castro, PT

## 2020-06-11 ENCOUNTER — HOSPITAL ENCOUNTER (OUTPATIENT)
Dept: WOUND CARE | Age: 45
Discharge: HOME | End: 2020-06-11
Attending: PODIATRIST
Payer: COMMERCIAL

## 2020-06-11 VITALS
HEIGHT: 67 IN | SYSTOLIC BLOOD PRESSURE: 117 MMHG | HEART RATE: 98 BPM | BODY MASS INDEX: 31.71 KG/M2 | WEIGHT: 202 LBS | TEMPERATURE: 96.6 F | DIASTOLIC BLOOD PRESSURE: 82 MMHG

## 2020-06-11 DIAGNOSIS — L97.412 DIABETIC ULCER OF RIGHT MIDFOOT ASSOCIATED WITH TYPE 2 DIABETES MELLITUS, WITH FAT LAYER EXPOSED (HCC): ICD-10-CM

## 2020-06-11 DIAGNOSIS — E11.621 DIABETIC ULCER OF RIGHT MIDFOOT ASSOCIATED WITH TYPE 2 DIABETES MELLITUS, WITH FAT LAYER EXPOSED (HCC): ICD-10-CM

## 2020-06-11 DIAGNOSIS — L08.9 DIABETIC FOOT INFECTION (HCC): ICD-10-CM

## 2020-06-11 DIAGNOSIS — E11.42 DIABETIC PERIPHERAL NEUROPATHY ASSOCIATED WITH TYPE 2 DIABETES MELLITUS (HCC): ICD-10-CM

## 2020-06-11 DIAGNOSIS — E11.628 DIABETIC FOOT INFECTION (HCC): ICD-10-CM

## 2020-06-11 PROCEDURE — 99214 OFFICE O/P EST MOD 30 MIN: CPT | Performed by: SURGERY

## 2020-06-11 PROCEDURE — 99213 OFFICE O/P EST LOW 20 MIN: CPT

## 2020-06-11 NOTE — PROGRESS NOTES
Wound Center Progress Note    Patient: Kiley Valencia MRN: [id number]  SSN: [id number]    YOB: 1975  Age: 41 y.o. Sex: female      Subjective:     Chief Complaint:  Kiley Valencia is a 41 y.o. WHITE OR  female who presents with a wound to the right lower extremity at the distal lateral foot. She had an inflamed callus to the 5th toe in middle March 2020 and presented to the ER where she underwent radiographs and was placed on oral augmentin. A week later the \"toe turned black\" and she returned to the ER and was admitted on 3/30/2020. She had a MRI which was positive for OM and underwent 5th ray resection. She has been under the care of Dr. Anais Souza and at her last follow up she requested being sent here. She has been using silvadene cream and epsom salt soaks. She goes barefoot. She has diabetes with last A1c 10.9 but has had her medication changed and spoken with a diabetes educator and states that her daily BG is half what it was. She admits to erythema and edema to the area and drainage. She completed a course of oral keflex last Friday. She returns on 5/14/2020. She is unable to make Wednesday appointments with Dr. Ortiz Sethi, so I will see her on Thursdays. She reports that she has been wearing her crocs on her right foot and comes in with an open sandal on her left foot. She does not have diabetic shoes. She was advised not to go barefoot previously. She does not smoke. She was advised about the criticality of a limb threatening event and the possibility of future limb threatening events and the goals of limb salvage. She was strongly advised to practice diabetic foot care and to never go barefoot. Strong advice regarding proper footwear was given. Her wound culture grew E. coli, ESBL Klebsiella, and MRSA, the E. coli and MRSA are sensitive to the Cipro and Bactrim that she was prescribed.   The Klebsiella has few signs of antibiotics and we will need to keep an eye on this wound. No additional debridement was required today. Wound is being dressed with Acticoat. She returns on 2020. She has 1 day remaining of antibiotics. She is having no new problems. She is working on getting proper footwear. She has a covered shoe with solid sole on the left foot. Cellulitis has resolved. There is no drainage. Wound was debrided of nonviable tissue. Bleeding was controlled using silver nitrate. Wound is redressed with Acticoat. She returns on 2020. She is off antibiotics. She is not having any problems. She has much better footwear on today. Wound did not require debridement today. Wound dressing will be changed to Farzana. She returns on 2020. She has enclosed footwear today but does not have a wide front box. She requirements are again explained to her. No debridement was required today. The tissues are becoming more normal but there is still a probable wound. We will continue with Farzana.     History of Present Illness:     Nature: Painless  Location: distal lateral right foot  Duration: 3/31/2020  Onset: Patient states it started as callus  Course:Improving  Aggravating/Alleviating: Worsened with pressure and dependency, improved with compression and rest  Treatment: silvadene, epsom salt soaks    Past Medical History:   Diagnosis Date    Diabetic peripheral neuropathy associated with type 2 diabetes mellitus     Nonproliferative diabetic retinopathy     Type 2 diabetes mellitus, uncontrolled       Past Surgical History:   Procedure Laterality Date    HX  SECTION   and     HX ORTHOPAEDIC      Right 5th Toe Amputated    HX OTHER SURGICAL      cyst removed from neck     Family History   Problem Relation Age of Onset    Diabetes Maternal Grandfather     Cancer Maternal Grandfather         lung (smoker)    Breast Cancer Maternal Grandmother     Crohn's Disease Paternal Grandmother     Cancer Paternal Grandfather lung (smoker)      Social History     Tobacco Use    Smoking status: Former Smoker     Packs/day: 0.50     Last attempt to quit: 2007     Years since quittin.5    Smokeless tobacco: Never Used   Substance Use Topics    Alcohol use: No     Alcohol/week: 0.0 standard drinks       Prior to Admission medications    Medication Sig Start Date End Date Taking? Authorizing Provider   metFORMIN ER (GLUCOPHAGE XR) 750 mg tablet Take 1 Tab by mouth two (2) times a day. 20   Chuyita Lyons MD   escitalopram oxalate (LEXAPRO) 10 mg tablet Take 10 mg by mouth daily. Pt reports she takes as needed. 10/18/19   Provider, Historical   JARDIANCE 25 mg tablet Take 1 Tab by mouth daily. 19   Tessa Alvarez PA-C   HUMALOG [facility] INSULIN 100 unit/mL kwikpen Take 20 units before each meal plus correction 3/50 >150, max daily dose 100 units 19   Tessa Alvarez PA-C   FREESTYLE MILIND 14 DAY READER misc Use to check blood glucose 4 times daily E11.19   Tessa Alvarez PA-C   FREESTYLE MILIND 14 DAY SENSOR kit Use to check blood glucose 4 times daily E11.19   Leigha Busch PA-C   TRESIBA FLEXTOUCH U-200 200 unit/mL (3 mL) inpn 80 Units by SubCUTAneous route daily. Patient taking differently: 40 Units by SubCUTAneous route daily. 19   Tessa Alvarez PA-C   aspirin/acetaminophen/caffeine (EXCEDRIN MIGRAINE PO) Take 1 Tab by mouth as needed. Provider, Historical   atorvastatin (LIPITOR) 20 mg tablet Take 1 Tab by mouth daily. 19   Tessa Alvarez PA-C   ONE TOUCH DELICA 33 gauge misc Check blood glucose three times daily. Dx E11.65 18   Daniel De La Cruz MD   FRANSISCO PEN NEEDLE 32 gauge x \" ndle 2 injections per day. Dx E11.18   Daniel De La Cruz MD   gabapentin (NEURONTIN) 300 mg capsule Take 1 Cap by mouth two (2) times a day. Patient taking differently: Take 300 mg by mouth as needed.  18   Ignacio Moreno MD     No Known Allergies Review of Systems:  The patient has no difficulty with chest pain or shortness of breath. No fever or chills. No Nausea, vomiting or diarrhea. Comprehensive review of systems was otherwise unremarkable except as noted above. Lab Results   Component Value Date/Time    Hemoglobin A1c 10.9 03/30/2020 04:59 AM    Hemoglobin A1c (POC) 9.8 11/05/2019 04:00 PM    Hemoglobin A1c, External 12.7 03/16/2016        Immunization History   Administered Date(s) Administered    Pneumococcal Polysaccharide (PPSV-23) 03/19/2016       Body mass index is 31.64 kg/m². Counseling regarding nutrition done: Yes. Recommend Leonid nutritional supplement for wound healing. Current medications:  Current Outpatient Medications   Medication Sig Dispense Refill    metFORMIN ER (GLUCOPHAGE XR) 750 mg tablet Take 1 Tab by mouth two (2) times a day. 60 Tab 2    escitalopram oxalate (LEXAPRO) 10 mg tablet Take 10 mg by mouth daily. Pt reports she takes as needed. 0    JARDIANCE 25 mg tablet Take 1 Tab by mouth daily. 90 Tab 3    HUMALOG KWIKPEN INSULIN 100 unit/mL kwikpen Take 20 units before each meal plus correction 3/50 >150, max daily dose 100 units 30 Pen 3    FREESTYLE MILIND 14 DAY READER misc Use to check blood glucose 4 times daily E11.65 1 Each 0    FREESTYLE MILIND 14 DAY SENSOR kit Use to check blood glucose 4 times daily E11.65 2 Kit 11    TRESIBA FLEXTOUCH U-200 200 unit/mL (3 mL) inpn 80 Units by SubCUTAneous route daily. (Patient taking differently: 40 Units by SubCUTAneous route daily.) 12 Pen 3    aspirin/acetaminophen/caffeine (EXCEDRIN MIGRAINE PO) Take 1 Tab by mouth as needed.  atorvastatin (LIPITOR) 20 mg tablet Take 1 Tab by mouth daily. 30 Tab 11    ONE TOUCH DELICA 33 gauge misc Check blood glucose three times daily. Dx E11.65 300 Lancet 3    FRANSISCO PEN NEEDLE 32 gauge x 5/32\" ndle 2 injections per day.   Dx E11.65 200 Pen Needle 3    gabapentin (NEURONTIN) 300 mg capsule Take 1 Cap by mouth two (2) times a day. (Patient taking differently: Take 300 mg by mouth as needed.) 60 Cap 11         Objective:     Physical Exam:     Visit Vitals  Temp (!) 96.6 °F (35.9 °C)   Ht 5' 7\" (1.702 m)   Wt 202 lb (91.6 kg)   BMI 31.64 kg/m²       General: well developed, well nourished, pleasant , NAD. Hygiene good  Psych: cooperative. Pleasant. No anxiety or depression. Normal mood and affect. HEENT: Normocephalic, atraumatic. EOMI. Conjunctiva clear. No scleral icterus. Neck: Normal range of motion. No masses. Chest: Good air entry bilaterally. Respirations nonlabored  Cardio: Normal heart sounds,no rubs, murmurs or gallops  Abdomen: Soft, nontender, nondistended, normoactive bowel sounds    Vascular: DP and PT pulses are palpable at 2/4 bilateral. Skin temperature is uniform from proximal to distal bilateral. Hair growth is absent bilateral.   Derm: Nails 1-5 bilateral are WNL. No paronychial infections are noted. Skin is atrophic and xerotic. No subcutaneous masses or hyperpigmented lesions are present. Neuro: Epicritic sensation is absent bilateral. Protective sensation is absent with 5.07 SWMF testing to all 10 sites bilateral. Muscle tone and bulk is symmetric bilateral.  Msk: Muscle strength is 5/5 for all prime movers of the foot bilateral. ROM of all joints is full and fluid without pain. No effusions are palpable. Full thickness ulceration is noted to the distal lateral right foot in place of prior 5th toe. Thin hyperkeratotic rim with fibrous base. Focal erythema and heat. No odor. Soft end feel with no bone exposed or palpable. No undermining or sinus track is noted. No fluctuance with palpation. This has improved dramatically from initial visit.       Ulcer Description:   Wound Foot Right (Active)   Number of days: 72       Wound Foot Right 5th Toe Amputation Site 05/06/20 (Active)   Dressing Status Clean, dry, and intact 6/11/2020  1:15 PM   Non-staged Wound Description Full thickness 6/11/2020 1:15 PM   Wound Length (cm) 0.6 cm 6/11/2020  1:15 PM   Wound Width (cm) 0.2 cm 6/11/2020  1:15 PM   Wound Depth (cm) 0.4 cm 6/11/2020  1:15 PM   Wound Surface Area (cm^2) 0.12 cm^2 6/11/2020  1:15 PM   Wound Volume (cm^3) 0.05 cm^3 6/11/2020  1:15 PM   Change in Wound Size % 91.67 6/11/2020  1:15 PM   Condition of Base Granulation 6/11/2020  1:15 PM   Tissue Type Percent Pink 70 5/21/2020  1:53 PM   Tissue Type Percent Red 100 6/11/2020  1:15 PM   Tissue Type Percent Yellow 30 5/21/2020  1:53 PM   Drainage Amount Scant 6/11/2020  1:15 PM   Drainage Color Serosanguinous 6/11/2020  1:15 PM   Wound Odor None 6/11/2020  1:15 PM   Kateryna-wound Assessment Intact 5/21/2020  1:53 PM   Cleansing and Cleansing Agents  Normal saline 6/11/2020  1:15 PM   Dressing Changed Changed/New 5/21/2020  1:53 PM   Procedure Tolerated Well 5/28/2020  4:14 PM   Number of days: 36                                 Data Review:   No results found for this or any previous visit (from the past 336 hour(s)). I independently reviewed recent labs, pathology reports, microbiology reports and radiology studies as noted above. Assessment:     41 y.o. female with diabetic ulceration to the right distal lateral foot    Plan:     Patient was examined and evaluated today. They were educated on the barriers to healing. Culture taken due to focal erythema and heat. Will contact her with results. Begin acticoat three times a week. Stop the silvadene cream and soaks. Do not get wet in the shower. I do not recommend barefoot weight bearing with diabetes. A shoe with rubber sole will provide protection and support. She is to continue to work on lowering her A1c. Follow with Dr. Opal Mendez as scheduled. She returns to the wound center on 5/14/2020. She is unable to come on Wednesday to see Dr. Toni Swanson, so I will see her on Thursdays. We will continue with Acticoat dressings. She will complete her antibiotic course of Cipro/Bactrim for 4 more days. Her cultures grew E. Coli, ESBL Klebsiella and MRSA. She returns on 5/21/2020. She has 1 day remaining of antibiotics. Cellulitis has resolved. Wound is improved. She is working on obtaining proper footwear. I will see her back in 1 week. She returns on 5/28/2020. She is doing better. She is off antibiotics. There is no evidence of active infection. Wound base is clean. Footwear is improved and appropriate. She returns on 6/11/2020. She continues to improve. Tissues are becoming more normal.  We will continue to pack the wound with Farzana. I will see her back in 2 weeks. Any procedures done today in the wound center are documented in a seperate note in [facility] care made part of this record by reference. Patient instructed on the following: Follow all wound dressing instructions. Do not get dressing or wound wet. May shower if wound can be effectively kept dry. Cast covers may be purchased at [facility] and [facility]. Should you experience increased redness, swelling, pain, foul odor, size of wound(s), or have a temperature over 101 degrees please contact the [address] [place] [address] Road at [phone] or if after hours contact your primary care physician or go to the hospital emergency department. Orders Placed This Encounter    WOUND CARE, DRESSING CHANGE     Right 5th Toe Amputation Site  Cleanse with Normal Saline  Pack Puracol Plus Ag (Collagen with silver): cut top approximate size of wound, apply to wound bed.    Cover with ABD, Wrap with Rolled Gauze or Bordered Foam  Change 3x/Week     DO NOT GET FOOT WET-PURCHASE CAST COVER AT [facility] OR [facility]     Standing Status:   Standing     Number of Occurrences:   1       Follow-up Information     Follow up With Specialties Details Why Contact Info    [address] In 2 weeks For wound re-check [place] [id number] [phone]             Signed By: Woody Colmenares MD     June 11, 2020

## 2020-06-11 NOTE — WOUND CARE
[address] [place] [address] [address] Phone: [phone] Fax: [phone] Patient: Amari Posadas MRN: [id number]  SSN: [id number] YOB: 1975  Age: 41 y.o. Sex: female Return Appointment: 2 weeks with Connor Levine MD 
 
Instructions: Right 5th Toe Amputation Site Cleanse with Normal Saline Pack Puracol Plus Ag (Collagen with silver): cut top approximate size of wound, apply to wound bed. Cover with ABD, Wrap with Rolled Gauze or Bordered Foam 
Change 3x/Week 
  
DO NOT GET FOOT WET-PURCHASE CAST COVER AT [facility] OR [facility] 
 
Should you experience increased redness, swelling, pain, foul odor, size of wound(s), or have a temperature over 101 degrees please contact the [address] [place] [address] Road at [phone] or if after hours contact your primary care physician or go to the hospital emergency department. Signed By: Sanford Hua PT, [facility] June 11, 2020

## 2020-06-11 NOTE — DISCHARGE INSTRUCTIONS
[address]  [address], [address]  Phone: [phone]  Fax: [phone]    Patient: Betzaida Corona MRN: [id number]  SSN: [id number]    YOB: 1975  Age: 41 y.o. Sex: female       Return Appointment: 2 weeks with Maynor Berger MD    Instructions: Right 5th Toe Amputation Site  Cleanse with Normal Saline  Pack Puracol Plus Ag (Collagen with silver): cut top approximate size of wound, apply to wound bed. Cover with ABD, Wrap with Rolled Gauze or Bordered Foam  Change 3x/Week     DO NOT GET FOOT WET-PURCHASE CAST COVER AT [facility] OR [facility]    Should you experience increased redness, swelling, pain, foul odor, size of wound(s), or have a temperature over 101 degrees please contact the [address] [place] [address] Road at [phone] or if after hours contact your primary care physician or go to the hospital emergency department.     Signed By: Mariela Bethea, PT, [address] [facility]     June 11, 2020

## 2020-06-25 ENCOUNTER — HOSPITAL ENCOUNTER (OUTPATIENT)
Dept: WOUND CARE | Age: 45
Discharge: HOME | End: 2020-06-25
Attending: PODIATRIST
Payer: COMMERCIAL

## 2020-06-25 VITALS
RESPIRATION RATE: 18 BRPM | TEMPERATURE: 97.2 F | HEIGHT: 67 IN | DIASTOLIC BLOOD PRESSURE: 82 MMHG | SYSTOLIC BLOOD PRESSURE: 116 MMHG | OXYGEN SATURATION: 99 % | WEIGHT: 201.6 LBS | BODY MASS INDEX: 31.64 KG/M2 | HEART RATE: 95 BPM

## 2020-06-25 DIAGNOSIS — L08.9 DIABETIC FOOT INFECTION (HCC): ICD-10-CM

## 2020-06-25 DIAGNOSIS — E11.621 DIABETIC ULCER OF RIGHT MIDFOOT ASSOCIATED WITH TYPE 2 DIABETES MELLITUS, WITH FAT LAYER EXPOSED (HCC): ICD-10-CM

## 2020-06-25 DIAGNOSIS — E11.628 DIABETIC FOOT INFECTION (HCC): ICD-10-CM

## 2020-06-25 DIAGNOSIS — L97.412 DIABETIC ULCER OF RIGHT MIDFOOT ASSOCIATED WITH TYPE 2 DIABETES MELLITUS, WITH FAT LAYER EXPOSED (HCC): ICD-10-CM

## 2020-06-25 DIAGNOSIS — E11.42 DIABETIC PERIPHERAL NEUROPATHY ASSOCIATED WITH TYPE 2 DIABETES MELLITUS (HCC): ICD-10-CM

## 2020-06-25 PROCEDURE — 99212 OFFICE O/P EST SF 10 MIN: CPT

## 2020-06-25 PROCEDURE — 99214 OFFICE O/P EST MOD 30 MIN: CPT | Performed by: SURGERY

## 2020-06-25 NOTE — DISCHARGE INSTRUCTIONS
[address]  [address], [address]   Phone: [phone]  Fax: [phone]    Patient: Rosales Lambert MRN: [id number]  SSN: [id number]    YOB: 1975  Age: 41 y.o. Sex: female       Return Appointment: Discharge from wound center at this time    Instructions: Right 5th Toe Amputation Site  Wound is healed! Patient may shower as previously. Patient may get in the pool as desired. No need for dressings at this time. Discharge from wound center at this time. Should you experience increased redness, swelling, pain, foul odor, size of wound(s), or have a temperature over 101 degrees please contact the [address] [place] [address] Road at [phone] or if after hours contact your primary care physician or go to the hospital emergency department.     Signed By: Sreedhar Hill PT, [facility]     June 25, 2020

## 2020-06-25 NOTE — PROGRESS NOTES
06/25/20 1324   Wound Foot Right 5th Toe Amputation Site 05/06/20   Date First Assessed: 05/06/20   Wound Approximate Age at First Assessment (Weeks): 5 weeks  Primary Wound Type: Open incision/surgical site  Location: Foot  Wound Location Orientation: Right  Wound Description: 5th Toe Amputation Site  Date of First O...    Dressing Status Clean, dry, and intact   Dressing Type Open to air   Non-staged Wound Description Full thickness   Wound Length (cm) 0 cm   Wound Width (cm) 0 cm   Wound Depth (cm) 0 cm   Wound Surface Area (cm^2) 0 cm^2   Wound Volume (cm^3) 0 cm^3   Change in Wound Size % 100   Condition of Base Epithelializing   Epithelialization (%) 100   Drainage Amount None   Wound Odor None   Cleansing and Cleansing Agents  Normal saline

## 2020-06-25 NOTE — PROGRESS NOTES
Wound Center Progress Note    Patient: Farheen Curtis MRN: [id number]  SSN: [id number]    YOB: 1975  Age: 41 y.o. Sex: female      Subjective:     Chief Complaint:  Farheen Curtis is a 41 y.o. WHITE OR  female who presents with a wound to the right lower extremity at the distal lateral foot. She had an inflamed callus to the 5th toe in middle March 2020 and presented to the ER where she underwent radiographs and was placed on oral augmentin. A week later the \"toe turned black\" and she returned to the ER and was admitted on 3/30/2020. She had a MRI which was positive for OM and underwent 5th ray resection. She has been under the care of Dr. Des Castillo and at her last follow up she requested being sent here. She has been using silvadene cream and epsom salt soaks. She goes barefoot. She has diabetes with last A1c 10.9 but has had her medication changed and spoken with a diabetes educator and states that her daily BG is half what it was. She admits to erythema and edema to the area and drainage. She completed a course of oral keflex last Friday. She returns on 5/14/2020. She is unable to make Wednesday appointments with Dr. Giovany Vallejo, so I will see her on Thursdays. She reports that she has been wearing her crocs on her right foot and comes in with an open sandal on her left foot. She does not have diabetic shoes. She was advised not to go barefoot previously. She does not smoke. She was advised about the criticality of a limb threatening event and the possibility of future limb threatening events and the goals of limb salvage. She was strongly advised to practice diabetic foot care and to never go barefoot. Strong advice regarding proper footwear was given. Her wound culture grew E. coli, ESBL Klebsiella, and MRSA, the E. coli and MRSA are sensitive to the Cipro and Bactrim that she was prescribed.   The Klebsiella has few signs of antibiotics and we will need to keep an eye on this wound. No additional debridement was required today. Wound is being dressed with Acticoat. She returns on 2020. She has 1 day remaining of antibiotics. She is having no new problems. She is working on getting proper footwear. She has a covered shoe with solid sole on the left foot. Cellulitis has resolved. There is no drainage. Wound was debrided of nonviable tissue. Bleeding was controlled using silver nitrate. Wound is redressed with Acticoat. She returns on 2020. She is off antibiotics. She is not having any problems. She has much better footwear on today. Wound did not require debridement today. Wound dressing will be changed to Farzana. She returns on 2020. She has enclosed footwear today but does not have a wide front box. Shoe requirements are again explained to her. No debridement was required today. The tissues are becoming more normal but there is still a probable wound. We will continue with Farzana. She returns on 2020. She again is wearing the same enclosed footwear that does not have a wide front box. A final recommendation of footwear was made with her. She was also given instructions regarding bathing and swimming. Her wound is closed. Surrounding tissue is normal.  She does not require further dressings. She requires exquisite foot care.     History of Present Illness:     Nature: Painless  Location: distal lateral right foot  Duration: 3/31/2020  Onset: Patient states it started as callus  Course:Improving  Aggravating/Alleviating: Worsened with pressure and dependency, improved with compression and rest  Treatment: silvadene, epsom salt soaks    Past Medical History:   Diagnosis Date    Diabetic peripheral neuropathy associated with type 2 diabetes mellitus     Nonproliferative diabetic retinopathy     Type 2 diabetes mellitus, uncontrolled       Past Surgical History:   Procedure Laterality Date    HX  SECTION   and     HX ORTHOPAEDIC      Right 5th Toe Amputated    HX OTHER SURGICAL      cyst removed from neck     Family History   Problem Relation Age of Onset    Diabetes Maternal Grandfather     Cancer Maternal Grandfather         lung (smoker)    Breast Cancer Maternal Grandmother     Crohn's Disease Paternal Grandmother     Cancer Paternal Grandfather         lung (smoker)      Social History     Tobacco Use    Smoking status: Former Smoker     Packs/day: 0.50     Last attempt to quit: 2007     Years since quittin.5    Smokeless tobacco: Never Used   Substance Use Topics    Alcohol use: No     Alcohol/week: 0.0 standard drinks       Prior to Admission medications    Medication Sig Start Date End Date Taking? Authorizing Provider   metFORMIN ER (GLUCOPHAGE XR) 750 mg tablet Take 1 Tab by mouth two (2) times a day. 20   Azeem Lyons MD   escitalopram oxalate (LEXAPRO) 10 mg tablet Take 10 mg by mouth daily. Pt reports she takes as needed. 10/18/19   Provider, Historical   JARDIANCE 25 mg tablet Take 1 Tab by mouth daily. 19   Suzanna Ford PA-C   [facility] INSULIN 100 unit/mL kwikpen Take 20 units before each meal plus correction 3/50 >150, max daily dose 100 units 19   Suzanna Ford PA-C   FREESTYLE MILIND 14 DAY READER misc Use to check blood glucose 4 times daily E11.65 19   Suzanna Ford PA-C   FREESTYLE MILIND 14 DAY SENSOR kit Use to check blood glucose 4 times daily E11.65 19   Demetrio Busch PA-C   TRESIBA FLEXTOUCH U-200 200 unit/mL (3 mL) inpn 80 Units by SubCUTAneous route daily. Patient taking differently: 40 Units by SubCUTAneous route daily. 19   Suzanna Ford PA-C   aspirin/acetaminophen/caffeine (EXCEDRIN MIGRAINE PO) Take 1 Tab by mouth as needed. Provider, Historical   atorvastatin (LIPITOR) 20 mg tablet Take 1 Tab by mouth daily.  19   Suzanna Ford PA-C   ONE TOUCH DELICA 33 gauge misc Check blood glucose three times daily. Dx E11.65 11/12/18   Marley Bob MD   FRANSISCO PEN NEEDLE 32 gauge x 5/32\" ndle 2 injections per day. Dx E11.65 11/12/18   Marley Bob MD   gabapentin (NEURONTIN) 300 mg capsule Take 1 Cap by mouth two (2) times a day. Patient taking differently: Take 300 mg by mouth as needed. 11/12/18   Marc Moreno MD     No Known Allergies     Review of Systems:  The patient has no difficulty with chest pain or shortness of breath. No fever or chills. No Nausea, vomiting or diarrhea. Comprehensive review of systems was otherwise unremarkable except as noted above. Lab Results   Component Value Date/Time    Hemoglobin A1c 10.9 03/30/2020 04:59 AM    Hemoglobin A1c (POC) 9.8 11/05/2019 04:00 PM    Hemoglobin A1c, External 12.7 03/16/2016        Immunization History   Administered Date(s) Administered    Pneumococcal Polysaccharide (PPSV-23) 03/19/2016       Body mass index is 31.58 kg/m². Counseling regarding nutrition done: Yes. Recommend Leonid nutritional supplement for wound healing. Current medications:  Current Outpatient Medications   Medication Sig Dispense Refill    metFORMIN ER (GLUCOPHAGE XR) 750 mg tablet Take 1 Tab by mouth two (2) times a day. 60 Tab 2    escitalopram oxalate (LEXAPRO) 10 mg tablet Take 10 mg by mouth daily. Pt reports she takes as needed. 0    JARDIANCE 25 mg tablet Take 1 Tab by mouth daily. 90 Tab 3    HUMALOG KWIKPEN INSULIN 100 unit/mL kwikpen Take 20 units before each meal plus correction 3/50 >150, max daily dose 100 units 30 Pen 3    FREESTYLE MILIND 14 DAY READER misc Use to check blood glucose 4 times daily E11.65 1 Each 0    FREESTYLE MILIND 14 DAY SENSOR kit Use to check blood glucose 4 times daily E11.65 2 Kit 11    TRESIBA FLEXTOUCH U-200 200 unit/mL (3 mL) inpn 80 Units by SubCUTAneous route daily.  (Patient taking differently: 40 Units by SubCUTAneous route daily.) 12 Pen 3    aspirin/acetaminophen/caffeine (EXCEDRIN MIGRAINE PO) Take 1 Tab by mouth as needed.  atorvastatin (LIPITOR) 20 mg tablet Take 1 Tab by mouth daily. 30 Tab 11    ONE TOUCH DELICA 33 gauge misc Check blood glucose three times daily. Dx E11.65 300 Lancet 3    FRANSISCO PEN NEEDLE 32 gauge x 5/32\" ndle 2 injections per day. Dx E11.65 200 Pen Needle 3    gabapentin (NEURONTIN) 300 mg capsule Take 1 Cap by mouth two (2) times a day. (Patient taking differently: Take 300 mg by mouth as needed.) 60 Cap 11         Objective:     Physical Exam:     Visit Vitals  /82 (BP 1 Location: Left arm, BP Patient Position: At rest)   Pulse 95   Temp 97.2 °F (36.2 °C)   Resp 18   Ht 5' 7\" (1.702 m)   Wt 201 lb 9.6 oz (91.4 kg)   SpO2 99%   BMI 31.58 kg/m²       General: well developed, well nourished, pleasant , NAD. Hygiene good  Psych: cooperative. Pleasant. No anxiety or depression. Normal mood and affect. HEENT: Normocephalic, atraumatic. EOMI. Conjunctiva clear. No scleral icterus. Neck: Normal range of motion. No masses. Chest: Good air entry bilaterally. Respirations nonlabored  Cardio: Normal heart sounds,no rubs, murmurs or gallops  Abdomen: Soft, nontender, nondistended, normoactive bowel sounds    Vascular: DP and PT pulses are palpable at 2/4 bilateral. Skin temperature is uniform from proximal to distal bilateral. Hair growth is absent bilateral.   Derm: Nails 1-5 bilateral are WNL. No paronychial infections are noted. Skin is atrophic and xerotic. No subcutaneous masses or hyperpigmented lesions are present. Neuro: Epicritic sensation is absent bilateral. Protective sensation is absent with 5.07 SWMF testing to all 10 sites bilateral. Muscle tone and bulk is symmetric bilateral.  Msk: Muscle strength is 5/5 for all prime movers of the foot bilateral. ROM of all joints is full and fluid without pain. No effusions are palpable. Full thickness ulceration is noted to the distal lateral right foot in place of prior 5th toe.   Thin hyperkeratotic rim with fibrous base. Focal erythema and heat. No odor. Soft end feel with no bone exposed or palpable. No undermining or sinus track is noted. No fluctuance with palpation. This has improved dramatically from initial visit. Ulcer Description:   Wound Foot Right 5th Toe Amputation Site 05/06/20 (Active)   Dressing Status Clean, dry, and intact 6/25/2020  1:24 PM   Dressing Type Open to air 6/25/2020  1:24 PM   Non-staged Wound Description Full thickness 6/25/2020  1:24 PM   Wound Length (cm) 0 cm 6/25/2020  1:24 PM   Wound Width (cm) 0 cm 6/25/2020  1:24 PM   Wound Depth (cm) 0 cm 6/25/2020  1:24 PM   Wound Surface Area (cm^2) 0 cm^2 6/25/2020  1:24 PM   Wound Volume (cm^3) 0 cm^3 6/25/2020  1:24 PM   Change in Wound Size % 100 6/25/2020  1:24 PM   Condition of Base Epithelializing 6/25/2020  1:24 PM   Epithelialization (%) 100 6/25/2020  1:24 PM   Tissue Type Percent Pink 70 5/21/2020  1:53 PM   Tissue Type Percent Red 100 6/11/2020  1:15 PM   Tissue Type Percent Yellow 30 5/21/2020  1:53 PM   Drainage Amount None 6/25/2020  1:24 PM   Drainage Color Serosanguinous 6/11/2020  1:15 PM   Wound Odor None 6/25/2020  1:24 PM   Kateryna-wound Assessment Intact 5/21/2020  1:53 PM   Cleansing and Cleansing Agents  Normal saline 6/25/2020  1:24 PM   Dressing Changed Changed/New 5/21/2020  1:53 PM   Procedure Tolerated Well 5/28/2020  4:14 PM   Number of days: 50       [REMOVED] Wound Foot Right (Removed)   Number of days: 72                   Data Review:   No results found for this or any previous visit (from the past 336 hour(s)). I independently reviewed recent labs, pathology reports, microbiology reports and radiology studies as noted above. Assessment:     41 y.o. female with diabetic ulceration to the right distal lateral foot    Plan:     Patient was examined and evaluated today. They were educated on the barriers to healing. Culture taken due to focal erythema and heat. Will contact her with results.  Begin acticoat three times a week. Stop the silvadene cream and soaks. Do not get wet in the shower. I do not recommend barefoot weight bearing with diabetes. A shoe with rubber sole will provide protection and support. She is to continue to work on lowering her A1c. Follow with Dr. You Sellers as scheduled. She returns to the wound center on 5/14/2020. She is unable to come on Wednesday to see Dr. Jorge Hsu, so I will see her on Thursdays. We will continue with Acticoat dressings. She will complete her antibiotic course of Cipro/Bactrim for 4 more days. Her cultures grew E. Coli, ESBL Klebsiella and MRSA. She returns on 5/21/2020. She has 1 day remaining of antibiotics. Cellulitis has resolved. Wound is improved. She is working on obtaining proper footwear. I will see her back in 1 week. She returns on 5/28/2020. She is doing better. She is off antibiotics. There is no evidence of active infection. Wound base is clean. Footwear is improved and appropriate. She returns on 6/11/2020. She continues to improve. Tissues are becoming more normal.  We will continue to pack the wound with Farzana. She returns on 6/25/2020. The wound is now closed. She does not require additional dressings. She was given instructions with regards to bathing and swimming. Again it was emphasized to obtain proper footwear. I will see her back as needed. All procedures done today in the wound center are documented in a seperate note in [facility] care made part of this record by reference. Patient instructed on the following: Follow all wound dressing instructions. Do not get dressing or wound wet. May shower if wound can be effectively kept dry. Cast covers may be purchased at [facility] and [facility].     Should you experience increased redness, swelling, pain, foul odor, size of wound(s), or have a temperature over 101 degrees please contact the [address] [place] [address] Road at [phone] or if after hours contact your primary care physician or go to the hospital emergency department. No orders of the defined types were placed in this encounter.       Follow-up Information    None            Signed By: Osvaldo Voss MD     June 25, 2020

## 2020-06-25 NOTE — WOUND CARE
[address] [place] [address] [address] Phone: [phone] Fax: [phone] Patient: Betzaida Corona MRN: [id number]  SSN: [id number] YOB: 1975  Age: 41 y.o. Sex: female Return Appointment: Discharge from wound center at this time. Instructions: Right 5th Toe Amputation Site Wound is healed! Patient may shower as previously. Patient may get in the pool as desired. No need for dressings at this time. Discharge from wound center at this time. Should you experience increased redness, swelling, pain, foul odor, size of wound(s), or have a temperature over 101 degrees please contact the [address] [place] [address] Road at [phone] or if after hours contact your primary care physician or go to the hospital emergency department. Signed By: Mariela Bethea PT, [facility] June 25, 2020

## 2021-02-23 PROBLEM — Z79.899 ENCOUNTER FOR LONG-TERM CURRENT USE OF MEDICATION: Status: ACTIVE | Noted: 2021-02-23

## 2021-04-30 ENCOUNTER — HOSPITAL ENCOUNTER (OUTPATIENT)
Dept: LAB | Age: 46
Discharge: HOME | End: 2021-04-30

## 2021-04-30 PROCEDURE — 88312 SPECIAL STAINS GROUP 1: CPT

## 2021-04-30 PROCEDURE — 88305 TISSUE EXAM BY PATHOLOGIST: CPT

## 2021-06-04 PROBLEM — R51.9 CHRONIC NONINTRACTABLE HEADACHE: Status: ACTIVE | Noted: 2021-06-04

## 2021-06-04 PROBLEM — G89.29 CHRONIC NONINTRACTABLE HEADACHE: Status: ACTIVE | Noted: 2021-06-04

## 2021-06-04 PROBLEM — R43.9 DYSOSMIA: Status: ACTIVE | Noted: 2021-06-04

## 2021-06-18 ENCOUNTER — HOSPITAL ENCOUNTER (OUTPATIENT)
Dept: MRI IMAGING | Age: 46
Discharge: HOME | End: 2021-06-18
Attending: PSYCHIATRY & NEUROLOGY
Payer: COMMERCIAL

## 2021-06-18 DIAGNOSIS — R43.9 DYSOSMIA: ICD-10-CM

## 2021-06-18 PROCEDURE — 70551 MRI BRAIN STEM W/O DYE: CPT

## 2021-06-21 NOTE — PROGRESS NOTES
Brain MRI reviewed and report reviewed. Good results. Nothing otherwise to report. No therapeutic change to recommend.         Varun Rodgers MD  Consultative Neurology, Neurodiagnostics and Neurotherapeutics  Neuroelectrophysiology, EEG, EMG  [facility] Neurology  [facility] [address]   [place], [address]   Phone:  [phone]  Fax:   [phone]

## 2022-01-14 ENCOUNTER — HOSPITAL ENCOUNTER (EMERGENCY)
Age: 47
Discharge: HOME | End: 2022-01-14
Attending: STUDENT IN AN ORGANIZED HEALTH CARE EDUCATION/TRAINING PROGRAM
Payer: COMMERCIAL

## 2022-01-14 ENCOUNTER — APPOINTMENT (OUTPATIENT)
Dept: GENERAL RADIOLOGY | Age: 47
End: 2022-01-14
Attending: STUDENT IN AN ORGANIZED HEALTH CARE EDUCATION/TRAINING PROGRAM
Payer: COMMERCIAL

## 2022-01-14 VITALS
HEIGHT: 68 IN | WEIGHT: 172 LBS | SYSTOLIC BLOOD PRESSURE: 118 MMHG | RESPIRATION RATE: 20 BRPM | BODY MASS INDEX: 26.07 KG/M2 | TEMPERATURE: 98.1 F | DIASTOLIC BLOOD PRESSURE: 78 MMHG | HEART RATE: 84 BPM | OXYGEN SATURATION: 100 %

## 2022-01-14 DIAGNOSIS — L08.9 DIABETIC FOOT INFECTION (HCC): ICD-10-CM

## 2022-01-14 DIAGNOSIS — E11.628 DIABETIC FOOT INFECTION (HCC): ICD-10-CM

## 2022-01-14 DIAGNOSIS — L08.9 TOE INFECTION: Primary | ICD-10-CM

## 2022-01-14 LAB
ALBUMIN SERPL-MCNC: 2.8 G/DL (ref 3.5–5)
ALBUMIN/GLOB SERPL: 0.7 {RATIO} (ref 1.2–3.5)
ALP SERPL-CCNC: 84 U/L (ref 50–130)
ALT SERPL-CCNC: 14 U/L (ref 12–65)
ANION GAP SERPL CALC-SCNC: 5 MMOL/L (ref 7–16)
AST SERPL-CCNC: 12 U/L (ref 15–37)
BASOPHILS # BLD: 0.1 K/UL (ref 0–0.2)
BASOPHILS NFR BLD: 1 % (ref 0–2)
BILIRUB SERPL-MCNC: 0.4 MG/DL (ref 0.2–1.1)
BUN SERPL-MCNC: 16 MG/DL (ref 6–23)
CALCIUM SERPL-MCNC: 8.8 MG/DL (ref 8.3–10.4)
CHLORIDE SERPL-SCNC: 104 MMOL/L (ref 98–107)
CO2 SERPL-SCNC: 28 MMOL/L (ref 21–32)
CREAT SERPL-MCNC: 0.81 MG/DL (ref 0.6–1)
CRP SERPL HS-MCNC: 8.8 MG/L
DIFFERENTIAL METHOD BLD: ABNORMAL
EOSINOPHIL # BLD: 0.7 K/UL (ref 0–0.8)
EOSINOPHIL NFR BLD: 14 % (ref 0.5–7.8)
ERYTHROCYTE [DISTWIDTH] IN BLOOD BY AUTOMATED COUNT: 18.3 % (ref 11.9–14.6)
ERYTHROCYTE [SEDIMENTATION RATE] IN BLOOD: 10 MM/HR (ref 0–20)
GLOBULIN SER CALC-MCNC: 4 G/DL (ref 2.3–3.5)
GLUCOSE SERPL-MCNC: 331 MG/DL (ref 65–100)
HCT VFR BLD AUTO: 39 % (ref 35.8–46.3)
HGB BLD-MCNC: 12 G/DL (ref 11.7–15.4)
IMM GRANULOCYTES # BLD AUTO: 0 K/UL (ref 0–0.5)
IMM GRANULOCYTES NFR BLD AUTO: 1 % (ref 0–5)
LYMPHOCYTES # BLD: 1.4 K/UL (ref 0.5–4.6)
LYMPHOCYTES NFR BLD: 30 % (ref 13–44)
MCH RBC QN AUTO: 26.3 PG (ref 26.1–32.9)
MCHC RBC AUTO-ENTMCNC: 30.8 G/DL (ref 31.4–35)
MCV RBC AUTO: 85.3 FL (ref 79.6–97.8)
MONOCYTES # BLD: 0.3 K/UL (ref 0.1–1.3)
MONOCYTES NFR BLD: 6 % (ref 4–12)
NEUTS SEG # BLD: 2.2 K/UL (ref 1.7–8.2)
NEUTS SEG NFR BLD: 48 % (ref 43–78)
NRBC # BLD: 0.05 K/UL (ref 0–0.2)
PLATELET # BLD AUTO: 215 K/UL (ref 150–450)
PMV BLD AUTO: 9 FL (ref 9.4–12.3)
POTASSIUM SERPL-SCNC: 4.1 MMOL/L (ref 3.5–5.1)
PROT SERPL-MCNC: 6.8 G/DL (ref 6.3–8.2)
RBC # BLD AUTO: 4.57 M/UL (ref 4.05–5.2)
SODIUM SERPL-SCNC: 137 MMOL/L (ref 136–145)
WBC # BLD AUTO: 4.6 K/UL (ref 4.3–11.1)

## 2022-01-14 PROCEDURE — 99282 EMERGENCY DEPT VISIT SF MDM: CPT

## 2022-01-14 PROCEDURE — 73630 X-RAY EXAM OF FOOT: CPT

## 2022-01-14 PROCEDURE — 85025 COMPLETE CBC W/AUTO DIFF WBC: CPT

## 2022-01-14 PROCEDURE — 86141 C-REACTIVE PROTEIN HS: CPT

## 2022-01-14 PROCEDURE — 85652 RBC SED RATE AUTOMATED: CPT

## 2022-01-14 PROCEDURE — 80053 COMPREHEN METABOLIC PANEL: CPT

## 2022-01-14 RX ORDER — CLINDAMYCIN HYDROCHLORIDE 300 MG/1
300 CAPSULE ORAL 3 TIMES DAILY
Qty: 30 CAPSULE | Refills: 0 | Status: SHIPPED | OUTPATIENT
Start: 2022-01-14 | End: 2022-01-25

## 2022-01-14 NOTE — ED NOTES
43-year-old female presents to the ED with complaint of diabetic foot foot wound. Reports wound to right fourth toe, having increased in size in recent days. Denies all other complaint. States she had right fifth toe amputated in 2020 for similar. Patient evaluated initially in triage. Rapid Medical Evaluation was conducted and necessary orders have been placed. I have performed a medical screening exam.  Care will now be transferred to the provider in the back of the emergency department.   Gurney Denver, NP 11:04 AM

## 2022-01-14 NOTE — ED NOTES
I have reviewed discharge instructions with the patient. The patient verbalized understanding. Patient left ED via Discharge Method: ambulatory to Home with self    Opportunity for questions and clarification provided. Patient given 1 scripts. To continue your aftercare when you leave the hospital, you may receive an automated call from our care team to check in on how you are doing. This is a free service and part of our promise to provide the best care and service to meet your aftercare needs.  If you have questions, or wish to unsubscribe from this service please call [phone]. Thank you for Choosing our [employer] Emergency Department.

## 2022-01-14 NOTE — DISCHARGE INSTRUCTIONS
Please take the full course of antibiotics as prescribed starting today. Continue to adhere to your diabetic medication regimen. Please return for any worsening swelling/redness/streaking up the foot or fevers. Please follow-up ASAP with a primary doctor, I also recommend following up with the podiatrist which is listed in your paperwork.

## 2022-01-14 NOTE — ED PROVIDER NOTES
This patient is a 27-year-old female with history of diabetes mellitus and peripheral neuropathy who comes in with concern for a diabetic foot wound to the fourth toe of the right foot. She says that at the end of December she wore tennis shoes when she was at the beach and after she removed them she noticed a blister on the top of the toe. Over the past few days she noticed increased redness and swelling to the area. She has no sensation in the toes so she has not felt any pain. She denies any fevers or chills or streaking up the foot. She has history of an amputation of the fifth toe of the right foot in 2020. She does not have a podiatrist, she does have an endocrinologist that she follows with closely, most recent A1c in November was 11.            Past Medical History:   Diagnosis Date    Diabetic peripheral neuropathy associated with type 2 diabetes mellitus     Nonproliferative diabetic retinopathy     Type 2 diabetes mellitus, uncontrolled        Past Surgical History:   Procedure Laterality Date    HX CATARACT REMOVAL Left 2021    HX CATARACT REMOVAL Right 2021    HX  SECTION   and     HX ENDOSCOPY  2021    clear    HX ORTHOPAEDIC  2020    Right 5th Toe Amputated    HX OTHER SURGICAL      cyst removed from neck         Family History:   Problem Relation Age of Onset    Diabetes Maternal Grandfather     Cancer Maternal Grandfather         lung (smoker)    Breast Cancer Maternal Grandmother     Crohn's Disease Paternal Grandmother     Cancer Paternal Grandfather         lung (smoker)       Social History     Socioeconomic History    Marital status:      Spouse name: Not on file    Number of children: 2    Years of education: Not on file    Highest education level: Not on file   Occupational History    Not on file   Tobacco Use    Smoking status: Former Smoker     Packs/day: 0.50     Quit date: 2007     Years since quitting: 14.1    Smokeless tobacco: Never Used   Substance and Sexual Activity    Alcohol use: No     Alcohol/week: 0.0 standard drinks    Drug use: No    Sexual activity: Not on file   Other Topics Concern     Service Not Asked    Blood Transfusions Not Asked    Caffeine Concern Not Asked    Occupational Exposure Not Asked    Hobby Hazards Not Asked    Sleep Concern Not Asked    Stress Concern Not Asked    Weight Concern Not Asked    Special Diet Not Asked    Back Care Not Asked    Exercise Not Asked    Bike Helmet Not Asked   [address] Not Asked    Self-Exams Not Asked   Social History Narrative    Not on file     Social Determinants of Health     Financial Resource Strain:     Difficulty of Paying Living Expenses: Not on file   Food Insecurity:     Worried About Running Out of Food in the Last Year: Not on file    Nahid of Food in the Last Year: Not on file   Transportation Needs:     Lack of Transportation (Medical): Not on file    Lack of Transportation (Non-Medical):  Not on file   Physical Activity:     Days of Exercise per Week: Not on file    Minutes of Exercise per Session: Not on file   Stress:     Feeling of Stress : Not on file   Social Connections:     Frequency of Communication with Friends and Family: Not on file    Frequency of Social Gatherings with Friends and Family: Not on file    Attends Congregational Services: Not on file    Active Member of [address] [place] [address] [employer] or Organizations: Not on file    Attends Club or Organization Meetings: Not on file    Marital Status: Not on file   Intimate Partner Violence:     Fear of Current or Ex-Partner: Not on file    Emotionally Abused: Not on file    Physically Abused: Not on file    Sexually Abused: Not on file   Housing Stability:     Unable to Pay for Housing in the Last Year: Not on file    Number of [place] in the Last Year: Not on file    Unstable Housing in the Last Year: Not on file         ALLERGIES: Patient has no known allergies. Review of Systems   Constitutional: Negative for chills and fever. Respiratory: Negative for cough and shortness of breath. Cardiovascular: Negative for chest pain and leg swelling. Skin: Positive for color change and wound. All other systems reviewed and are negative. Vitals:    01/14/22 1105   BP: 118/78   Pulse: 84   Resp: 20   Temp: 98.1 °F (36.7 °C)   SpO2: 100%   Weight: 78 kg (172 lb)   Height: 5' 8\" (1.727 m)            Physical Exam  Vitals and nursing note reviewed. Constitutional:       General: She is not in acute distress. Appearance: Normal appearance. She is not ill-appearing, toxic-appearing or diaphoretic. HENT:      Head: Normocephalic and atraumatic. Eyes:      Conjunctiva/sclera: Conjunctivae normal.   Cardiovascular:      Rate and Rhythm: Normal rate and regular rhythm. Pulses: Normal pulses. Heart sounds: Normal heart sounds. No murmur heard. No friction rub. No gallop. Pulmonary:      Effort: Pulmonary effort is normal. No respiratory distress. Breath sounds: Normal breath sounds and air entry. No stridor, decreased air movement or transmitted upper airway sounds. No decreased breath sounds, wheezing, rhonchi or rales. Skin:     General: Skin is warm and dry. Findings: Erythema and wound present. No laceration or petechiae. Comments: Erythema and swelling right 4th toe, surgically absent 5th toe right foot. No streaking up the foot. Patient does not have sensation in the affected toe. Neurological:      General: No focal deficit present. Mental Status: She is alert and oriented to person, place, and time. Mental status is at baseline. MDM  Number of Diagnoses or Management Options  Diabetic foot infection ([address]): new and requires workup  Toe infection: new and requires workup  Diagnosis management comments: X-ray shows no signs of bony destruction or indication of osteomyelitis.   Labs unremarkable, WBC within normal limits, glucose is elevated at 331. Sed rate is within normal limits. Will discharge patient home on oral antibiotics and have her follow-up closely with the primary doctor. She says she currently does not have a PCP but I provided her a list of PCPs in the area and also provided her with info for follow-up with the podiatrist.  Return precautions discussed in detail patient verbalized understanding and agrees to plan. ED Course as of 01/14/22 1248   Fri Jan 14, 2022   1233 XR FOOT:    IMPRESSION  No fracture or dislocation. Arterial vascular calcifications are  noted. No areas of bony destruction are appreciated to indicate osteomyelitis.  [AR]      ED Course User Index  [AR] Odalis Bowles

## 2022-01-14 NOTE — ED TRIAGE NOTES
Pt states she has a wound on right fourth toe for about two weeks but got much worse since yesterday. Pt has had the fifth digit amputated on that same foot last year. Masked for triage.

## 2022-01-25 ENCOUNTER — HOSPITAL ENCOUNTER (OUTPATIENT)
Dept: WOUND CARE | Age: 47
Discharge: HOME | End: 2022-01-25
Attending: PODIATRIST
Payer: COMMERCIAL

## 2022-01-25 VITALS
TEMPERATURE: 97.9 F | HEIGHT: 67 IN | SYSTOLIC BLOOD PRESSURE: 148 MMHG | BODY MASS INDEX: 26.06 KG/M2 | WEIGHT: 166 LBS | DIASTOLIC BLOOD PRESSURE: 27 MMHG | HEART RATE: 96 BPM

## 2022-01-25 PROBLEM — E11.621 DIABETIC ULCER OF TOE OF RIGHT FOOT (HCC): Status: ACTIVE | Noted: 2022-01-25

## 2022-01-25 PROBLEM — L97.519 DIABETIC ULCER OF TOE OF RIGHT FOOT (HCC): Status: ACTIVE | Noted: 2022-01-25

## 2022-01-25 PROCEDURE — 99203 OFFICE O/P NEW LOW 30 MIN: CPT | Performed by: SURGERY

## 2022-01-25 PROCEDURE — 99213 OFFICE O/P EST LOW 20 MIN: CPT

## 2022-01-25 RX ORDER — SILVER SULFADIAZINE 10 G/1000G
CREAM TOPICAL ONCE
OUTPATIENT
Start: 2022-01-25 | End: 2022-01-25

## 2022-01-25 RX ORDER — TRIAMCINOLONE ACETONIDE 1 MG/G
OINTMENT TOPICAL ONCE
OUTPATIENT
Start: 2022-01-25 | End: 2022-01-25

## 2022-01-25 RX ORDER — LIDOCAINE 50 MG/G
OINTMENT TOPICAL ONCE
OUTPATIENT
Start: 2022-01-25 | End: 2022-01-25

## 2022-01-25 RX ORDER — LIDOCAINE HYDROCHLORIDE 20 MG/ML
JELLY TOPICAL ONCE
OUTPATIENT
Start: 2022-01-25 | End: 2022-01-25

## 2022-01-25 RX ORDER — CIPROFLOXACIN 500 MG/1
500 TABLET ORAL 2 TIMES DAILY
Qty: 14 TABLET | Refills: 0 | Status: SHIPPED | OUTPATIENT
Start: 2022-01-25

## 2022-01-25 RX ORDER — CLOBETASOL PROPIONATE 0.5 MG/G
OINTMENT TOPICAL ONCE
OUTPATIENT
Start: 2022-01-25 | End: 2022-01-25

## 2022-01-25 RX ORDER — LIDOCAINE HYDROCHLORIDE 20 MG/ML
5 SOLUTION OROPHARYNGEAL ONCE
OUTPATIENT
Start: 2022-01-25 | End: 2022-01-25

## 2022-01-25 RX ORDER — CLINDAMYCIN HYDROCHLORIDE 150 MG/1
300 CAPSULE ORAL EVERY 6 HOURS
Qty: 21 CAPSULE | Refills: 0 | Status: SHIPPED | OUTPATIENT
Start: 2022-01-25

## 2022-01-25 RX ORDER — BACITRACIN 500 [USP'U]/G
OINTMENT TOPICAL ONCE
OUTPATIENT
Start: 2022-01-25 | End: 2022-01-25

## 2022-01-25 RX ORDER — BACITRACIN ZINC AND POLYMYXIN B SULFATE 500; 1000 [USP'U]/G; [USP'U]/G
OINTMENT TOPICAL ONCE
OUTPATIENT
Start: 2022-01-25 | End: 2022-01-25

## 2022-01-25 RX ORDER — LIDOCAINE 40 MG/G
CREAM TOPICAL ONCE
OUTPATIENT
Start: 2022-01-25 | End: 2022-01-25

## 2022-01-25 RX ORDER — BETAMETHASONE DIPROPIONATE 0.5 MG/G
OINTMENT TOPICAL ONCE
OUTPATIENT
Start: 2022-01-25 | End: 2022-01-25

## 2022-01-25 RX ORDER — LIDOCAINE HYDROCHLORIDE 40 MG/ML
SOLUTION TOPICAL ONCE
OUTPATIENT
Start: 2022-01-25 | End: 2022-01-25

## 2022-01-25 RX ORDER — GENTAMICIN SULFATE 1 MG/G
OINTMENT TOPICAL ONCE
OUTPATIENT
Start: 2022-01-25 | End: 2022-01-25

## 2022-01-25 RX ORDER — MUPIROCIN 20 MG/G
OINTMENT TOPICAL ONCE
OUTPATIENT
Start: 2022-01-25 | End: 2022-01-25

## 2022-01-25 NOTE — WOUND CARE
01/25/22 1040   Wound Toe (Comment  which one) Anterior;Right anterior right 4th toe   Date First Assessed/Time First Assessed: 01/25/22 1038   Present on Hospital Admission: Yes  Wound Approximate Age at First Assessment (Weeks): 6 weeks  Primary Wound Type: Diabetic  Location: Toe (Comment  which one)  Wound Location Orientation: Ante. .. Wound Image    Wound Etiology Diabetic   Dressing Status Intact   Cleansed Cleansed with saline   Dressing/Treatment Open to air   Wound Length (cm) 0.5 cm   Wound Width (cm) 0.3 cm   Wound Depth (cm) 0.1 cm   Wound Surface Area (cm^2) 0.15 cm^2   Wound Volume (cm^3) 0.015 cm^3   Wound Assessment Pink/red   Drainage Amount Small   Drainage Description Serosanguinous   Wound Odor None   Akteryna-Wound/Incision Assessment Edematous; Blanchable erythema   Wound Thickness Description Full thickness   Wound Toe (Comment  which one) Right;Posterior posterior right fourth toe   Date First Assessed/Time First Assessed: 01/25/22 1039   Present on Hospital Admission: Yes  Wound Approximate Age at First Assessment (Weeks): 6 weeks  Primary Wound Type: Diabetic Ulcer  Location: Toe (Comment  which one)  Wound Location Orientation. ..    Wound Image    Wound Etiology Diabetic   Dressing Status Intact   Cleansed Cleansed with saline   Dressing/Treatment Open to air   Wound Length (cm) 1 cm   Wound Width (cm) 1.5 cm   Wound Depth (cm) 0.2 cm   Wound Surface Area (cm^2) 1.5 cm^2   Wound Volume (cm^3) 0.3 cm^3   Wound Assessment [place]/red;Slough   Drainage Amount Small   Drainage Description Serosanguinous   Wound Odor None   Kateryna-Wound/Incision Assessment Maceration;Blanchable erythema;Edematous   Wound Thickness Description Full thickness

## 2022-01-25 NOTE — WOUND CARE
[address]  [address], [address]  Phone: [phone]  Fax: [phone]    Patient: Ryder Hill MRN: [id number]  SSN: [id number]    YOB: 1975  Age: 40 y.o. Sex: female       Return Appointment: 1 week with Dr. Suzanna Valdovinos    Instructions: Right fourth toe anterior and posterior sites:  Cleanse with normal saline  Acticoat Flex 3: cut top approximate size of wound, apply to wound bed. Secure with rolled gauze  Change 3 times per week  Apply cotton in between toes and underneath hammer toes, for moisture control and change out daily  Obtain bactrim from pharmacy  Control blood sugars  Limit walking as much as possible    Should you experience increased redness, swelling, pain, foul odor, size of wound(s), or have a temperature over 101 degrees please contact the [address] [place] [address] Road at [phone] or if after hours contact your primary care physician or go to the hospital emergency department.     Signed By: Krystal Jackson RN     January 25, 2022

## 2022-01-25 NOTE — DISCHARGE INSTRUCTIONS
[address]  [address], [address]  Phone: [phone]  Fax: [phone]    Patient: Kayla Booker MRN: [id number]  SSN: [id number]    YOB: 1975  Age: 40 y.o. Sex: female       Return Appointment: 1 week with Dr. Joe Dennison    Instructions: Right fourth toe anterior and posterior sites:  Cleanse with normal saline  Acticoat Flex 3: cut top approximate size of wound, apply to wound bed. Secure with rolled gauze  Change 3 times per week  Apply cotton in between toes and underneath hammer toes, for moisture control and change out daily  Obtain bactrim from pharmacy  Control blood sugars  Limit walking as much as possible    Should you experience increased redness, swelling, pain, foul odor, size of wound(s), or have a temperature over 101 degrees please contact the [address] [place] [address] Road at [phone] or if after hours contact your primary care physician or go to the hospital emergency department.     Signed By: Nury Taylor RN     January 25, 2022

## 2022-01-25 NOTE — PROGRESS NOTES
[address], [address], [address] [place] [address] Rd  [phone]    Progress Notes   Anthony Griggs       MRN: [id number]     : 1975     Age: 40 y.o. Subjective/HPI:   This patient is a 40 y.o. female with diabetes and peripheral neuropathy seen and evaluated at the wound clinic for initial evaluation of ulceration of the right fourth toe. Patient first noted an ulcer on the dorsal aspect of of her right fourth toe on 2022. She was seen in the emergency room 2022 and was found to have a red fourth toe. X-rays done at that time showed no evidence for osteomyelitis. Patient was started on Cleocin and discharged to home. Since then the patient reports that the redness and swelling of her right fourth toe is basically unchanged. She does have some occasional drainage but has not had any fever or chills. Patient is able to ambulate without difficulty. Hemoglobin A1c was 11 in 2021. 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 Review of Systems  A comprehensive review of systems was negative except for that written in the HPI.     Past Medical History:   Diagnosis Date    Chronic kidney disease     Diabetes ([address]     Diabetic peripheral neuropathy associated with type 2 diabetes mellitus     Eczema     Hypertension     Nonproliferative diabetic retinopathy     Psoriasis     Type 2 diabetes mellitus, uncontrolled       Past Surgical History:   Procedure Laterality Date    HX CATARACT REMOVAL Left 2021    HX CATARACT REMOVAL Right 2021    HX  SECTION   and     HX ENDOSCOPY  2021    clear    HX ORTHOPAEDIC  2020    Right 5th Toe Amputated    HX OTHER SURGICAL      cyst removed from neck      No Known Allergies   Social History     Tobacco Use    Smoking status: Former Smoker     Packs/day: 0.50     Quit date: 2007     Years since quittin.1    Smokeless tobacco: Never Used   Substance Use Topics    Alcohol use: No     Alcohol/week: 0.0 standard drinks      Social History     Social History Narrative    Not on file     Family History   Problem Relation Age of Onset    Diabetes Maternal Grandfather     Cancer Maternal Grandfather         lung (smoker)    Breast Cancer Maternal Grandmother     Crohn's Disease Paternal Grandmother     Cancer Paternal Grandfather         lung (smoker)      Cannot display prior to admission medications because the patient has not been admitted in this contact. Current Outpatient Medications   Medication Sig   [facility] Tresiba FlexTouch U-200 200 unit/mL (3 mL) inpn pen 64 Units by SubCUTAneous route daily. 50 units daily, increase by 2 units every 4 days until FBG , max daily dose 80 units    metFORMIN ER (GLUCOPHAGE XR) 750 mg tablet Take 1 Tablet by mouth daily.  Jardiance 25 mg tablet Take 1 Tablet by mouth daily.  ergocalciferol (ERGOCALCIFEROL) 1,250 mcg (50,000 unit) capsule Take 1 Capsule by mouth two (2) times a week on Wednesday and Saturday.  HumaLOG KwikPen Insulin 100 unit/mL kwikpen Take 20 units before each meal plus correction 3/50 >150, max daily dose 100 units    Dexcom G6 Transmitter krystin Use to monitor blood glucose, E11.65    Dexcom G6 Sensor krystin Use to monitor blood glucose, E11.65    lisinopriL (PRINIVIL, ZESTRIL) 5 mg tablet Take 1 Tab by mouth daily. (Patient taking differently: Take 20 mg by mouth daily.)    atorvastatin (LIPITOR) 40 mg tablet Take 1 Tab by mouth daily.  aspirin/acetaminophen/caffeine (EXCEDRIN MIGRAINE PO) Take 1 Tab by mouth as needed.  ONE TOUCH DELICA 33 gauge misc Check blood glucose three times daily. Dx E11.65    FRANSISCO PEN NEEDLE 32 gauge x /32\" ndle 2 injections per day. Dx E07.99    Trulicity 1.5 JS/0.0 mL sub-q pen 0.5 mL by SubCUTAneous route every seven (7) days.  (Patient not taking: Reported on 2022)    DISABLED PLACARD (DISABLED PLACARD) DMV by Other route Once a year. No current facility-administered medications for this encounter. Objective:     Vitals:    01/25/22 1020 01/25/22 1033   BP:  (!) 148/27   Pulse:  96   Temp:  97.9 °F (36.6 °C)   Weight: 75.3 kg (166 lb)    Height: 5' 7\" (1.702 m)        Physical Exam:  Ambulation: Normal  Gen- the patient is well developed and in no acute distress  HEENT- no focal abnormalities of the scalp or face. Neck- no JVD or retractions  Lungs- normal respiratory effort. Cardiovascular:  Lower limb pulses: 3+. Abd- soft and no masses evident. Ext- warm without cyanosis. There is 1+ lower leg edema that appears well controlled. Skin- no jaundice or rashes. Please see the specific measurements and descriptions of the wound(s) below. Erythema and swelling right fourth toe. No purulence or odor. Neuro- alert and oriented x 3. No gross imotor deficits are present. Lower limb sensation is absent. Psychological: Affect normal. Mood normal. Memory intact.      Wound Foot Right 5th Toe Amputation Site 05/06/20 (Active)   Dressing Status Clean, dry, and intact 06/25/20 1324   Dressing/Treatment Open to air 06/25/20 1324   Wound Length (cm) 0 cm 06/25/20 1324   Wound Width (cm) 0 cm 06/25/20 1324   Wound Depth (cm) 0 cm 06/25/20 1324   Wound Surface Area (cm^2) 0 cm^2 06/25/20 1324   Change in Wound Size % 100 06/25/20 1324   Wound Volume (cm^3) 0 cm^3 06/25/20 1324   Drainage Amount None 06/25/20 1324   Drainage Description Serosanguinous 06/11/20 1315   Wound Odor None 06/25/20 1324   Kateryna-Wound/Incision Assessment Intact 05/21/20 1353   Wound Thickness Description Full thickness 06/25/20 1324   Number of days: 629       Wound Toe (Comment  which one) Anterior;Right anterior right 4th toe (Active)   Wound Image   01/25/22 1040   Wound Etiology Diabetic 01/25/22 1040   Dressing Status Intact 01/25/22 1040   Cleansed Cleansed with saline 01/25/22 1040 Dressing/Treatment Open to air 01/25/22 1040   Wound Length (cm) 0.5 cm 01/25/22 1040   Wound Width (cm) 0.3 cm 01/25/22 1040   Wound Depth (cm) 0.1 cm 01/25/22 1040   Wound Surface Area (cm^2) 0.15 cm^2 01/25/22 1040   Wound Volume (cm^3) 0.015 cm^3 01/25/22 1040   Wound Assessment Pink/red 01/25/22 1040   Drainage Amount Small 01/25/22 1040   Drainage Description Serosanguinous 01/25/22 1040   Wound Odor None 01/25/22 1040   Kateryna-Wound/Incision Assessment Edematous; Blanchable erythema 01/25/22 1040   Wound Thickness Description Full thickness 01/25/22 1040   Number of days: 0       Wound Toe (Comment  which one) Right;Posterior posterior right fourth toe (Active)   Wound Image   01/25/22 1040   Wound Etiology Diabetic 01/25/22 1040   Dressing Status Intact 01/25/22 1040   Cleansed Cleansed with saline 01/25/22 1040   Dressing/Treatment Open to air 01/25/22 1040   Wound Length (cm) 1 cm 01/25/22 1040   Wound Width (cm) 1.5 cm 01/25/22 1040   Wound Depth (cm) 0.2 cm 01/25/22 1040   Wound Surface Area (cm^2) 1.5 cm^2 01/25/22 1040   Wound Volume (cm^3) 0.3 cm^3 01/25/22 1040   Wound Assessment Pink/red;Slough 01/25/22 1040   Drainage Amount Small 01/25/22 1040   Drainage Description Serosanguinous 01/25/22 1040   Wound Odor None 01/25/22 1040   Kateryna-Wound/Incision Assessment Maceration;Blanchable erythema;Edematous 01/25/22 1040   Wound Thickness Description Full thickness 01/25/22 1040   Number of days: 0       [REMOVED] Wound Foot Right (Removed)   Number of days: 72        Assessment:   Small diabetic ulcer dorsal aspect of right fourth toe and full-thickness skin breakdown plantar aspect of right fourth toe  Patient Active Problem List   Diagnosis Code    Cellulitis L03.90    Neuropathy G62.9    Uncontrolled type 2 diabetes mellitus [id number]    Nonproliferative diabetic retinopathy  E11.3299    Diabetic peripheral neuropathy associated with type 2 diabetes mellitus E11.42    Elevated blood pressure [id number]  Mixed hyperlipidemia E78.2    Encounter for medication management Z79.899    Weakness of both legs R29.898    Paresthesia R20.2    Foot drop, left foot M21.372    Postural imbalance R29.3    Diabetic foot infection (HCC) E11.628, L08.9    Osteomyelitis (HCC) M86.9    Diabetic ulcer of right midfoot associated with type 2 diabetes mellitus, with fat layer exposed ([address] E11.621, L97.412    Encounter for long-term current use of medication Z79.899    Chronic nonintractable headache R51.9, G89.29    Dysosmia R43.9    Toe infection L08.9     Plan:   Right fourth toe appears to still have active infection therefore will start on Bactrim DS one p.o. twice daily. Also plan for dressing changes with Acticoat. Follow-up 1 week for recheck or sooner if the redness and swelling worsen. Follow-up Information     Follow up With Specialties Details Why Contact Info    [address] In 1 week For wound re-check [address] [place], [place] [address] [phone]            Thank you very much for the opportunity to participate in this patient's care. Signed By: Barry Ruiz MD     January 25, 2022        TIME:  If total time for today's E/M service is used for level of service it is documented below.     This time includes physician non-face-to-face service time visit on the date of service and includes    Preparing to see the patient (eg, review of tests)  Obtaining and/or reviewing separately obtained history  Performing a medically necessary appropriate examination and/or evaluation  Counseling and educating the patient/family/caregiver  Ordering medications, tests, or procedures  Referring and communicating with other health care professionals as needed  Documenting clinical information in the electronic or other health record  Independently interpreting results (not reported separately) and communicating results to the patient/family/caregiver  Care coordination (not reported separately)    E/M time =        25  minutes.

## 2022-01-26 ENCOUNTER — TELEPHONE (OUTPATIENT)
Dept: WOUND CARE | Age: 47
End: 2022-01-26

## 2022-01-26 DIAGNOSIS — L97.512 DIABETIC ULCER OF TOE OF RIGHT FOOT ASSOCIATED WITH DIABETES MELLITUS DUE TO UNDERLYING CONDITION, WITH FAT LAYER EXPOSED (HCC): ICD-10-CM

## 2022-01-26 DIAGNOSIS — E08.621 DIABETIC ULCER OF TOE OF RIGHT FOOT ASSOCIATED WITH DIABETES MELLITUS DUE TO UNDERLYING CONDITION, WITH FAT LAYER EXPOSED (HCC): ICD-10-CM

## 2022-01-26 NOTE — TELEPHONE ENCOUNTER
Patient's mother calls today with questions regarding wound care instructions and concerns that patient is vomiting since starting new antibiotic. Mother unsure if patient is taking antibiotic with food or what her blood glucose has been. Informed mother of wound care orders. Recommend eating 30 minutes before taking antibiotic. Mother verbalized understanding.

## 2022-03-18 PROBLEM — L08.9 TOE INFECTION: Status: ACTIVE | Noted: 2022-01-14

## 2022-03-18 PROBLEM — R51.9 CHRONIC NONINTRACTABLE HEADACHE: Status: ACTIVE | Noted: 2021-06-04

## 2022-03-18 PROBLEM — G89.29 CHRONIC NONINTRACTABLE HEADACHE: Status: ACTIVE | Noted: 2021-06-04

## 2022-03-18 PROBLEM — Z79.899 ENCOUNTER FOR MEDICATION MANAGEMENT: Status: ACTIVE | Noted: 2019-10-21

## 2022-03-19 PROBLEM — R43.9 DYSOSMIA: Status: ACTIVE | Noted: 2021-06-04

## 2022-03-19 PROBLEM — R20.2 PARESTHESIA: Status: ACTIVE | Noted: 2019-10-21

## 2022-03-19 PROBLEM — L08.9 DIABETIC FOOT INFECTION (HCC): Status: ACTIVE | Noted: 2020-03-29

## 2022-03-19 PROBLEM — R29.898 WEAKNESS OF BOTH LEGS: Status: ACTIVE | Noted: 2019-10-21

## 2022-03-19 PROBLEM — L97.519 DIABETIC ULCER OF TOE OF RIGHT FOOT (HCC): Status: ACTIVE | Noted: 2022-01-25

## 2022-03-19 PROBLEM — R29.3 POSTURAL IMBALANCE: Status: ACTIVE | Noted: 2019-10-21

## 2022-03-19 PROBLEM — E11.621 DIABETIC ULCER OF RIGHT MIDFOOT ASSOCIATED WITH TYPE 2 DIABETES MELLITUS, WITH FAT LAYER EXPOSED (HCC): Status: ACTIVE | Noted: 2020-05-06

## 2022-03-19 PROBLEM — E11.628 DIABETIC FOOT INFECTION (HCC): Status: ACTIVE | Noted: 2020-03-29

## 2022-03-19 PROBLEM — E11.621 DIABETIC ULCER OF TOE OF RIGHT FOOT (HCC): Status: ACTIVE | Noted: 2022-01-25

## 2022-03-19 PROBLEM — E78.2 MIXED HYPERLIPIDEMIA: Status: ACTIVE | Noted: 2019-02-19

## 2022-03-19 PROBLEM — L97.412 DIABETIC ULCER OF RIGHT MIDFOOT ASSOCIATED WITH TYPE 2 DIABETES MELLITUS, WITH FAT LAYER EXPOSED (HCC): Status: ACTIVE | Noted: 2020-05-06

## 2022-03-19 PROBLEM — M86.9 OSTEOMYELITIS (HCC): Status: ACTIVE | Noted: 2020-03-31

## 2022-03-19 PROBLEM — Z79.899 ENCOUNTER FOR LONG-TERM CURRENT USE OF MEDICATION: Status: ACTIVE | Noted: 2021-02-23

## 2022-03-20 PROBLEM — M21.372 FOOT DROP, LEFT FOOT: Status: ACTIVE | Noted: 2019-10-21

## 2022-05-31 ENCOUNTER — OFFICE VISIT (OUTPATIENT)
Dept: ENDOCRINOLOGY | Age: 47
End: 2022-05-31
Payer: COMMERCIAL

## 2022-05-31 VITALS
DIASTOLIC BLOOD PRESSURE: 62 MMHG | HEART RATE: 68 BPM | WEIGHT: 171.8 LBS | OXYGEN SATURATION: 99 % | HEIGHT: 67 IN | SYSTOLIC BLOOD PRESSURE: 112 MMHG | BODY MASS INDEX: 26.97 KG/M2

## 2022-05-31 DIAGNOSIS — E55.9 VITAMIN D DEFICIENCY: ICD-10-CM

## 2022-05-31 DIAGNOSIS — E11.21 MACROALBUMINURIC DIABETIC NEPHROPATHY (HCC): ICD-10-CM

## 2022-05-31 DIAGNOSIS — E11.42 DIABETIC PERIPHERAL NEUROPATHY ASSOCIATED WITH TYPE 2 DIABETES MELLITUS (HCC): ICD-10-CM

## 2022-05-31 DIAGNOSIS — E11.42 DIABETIC PERIPHERAL NEUROPATHY ASSOCIATED WITH TYPE 2 DIABETES MELLITUS (HCC): Primary | ICD-10-CM

## 2022-05-31 LAB — HBA1C MFR BLD: 13.1 %

## 2022-05-31 PROCEDURE — 95251 CONT GLUC MNTR ANALYSIS I&R: CPT | Performed by: PHYSICIAN ASSISTANT

## 2022-05-31 PROCEDURE — 83036 HEMOGLOBIN GLYCOSYLATED A1C: CPT | Performed by: PHYSICIAN ASSISTANT

## 2022-05-31 PROCEDURE — 3046F HEMOGLOBIN A1C LEVEL >9.0%: CPT | Performed by: PHYSICIAN ASSISTANT

## 2022-05-31 PROCEDURE — 99214 OFFICE O/P EST MOD 30 MIN: CPT | Performed by: PHYSICIAN ASSISTANT

## 2022-05-31 RX ORDER — METFORMIN HYDROCHLORIDE 750 MG/1
750 TABLET, EXTENDED RELEASE ORAL DAILY
Qty: 90 TABLET | Refills: 3 | Status: SHIPPED | OUTPATIENT
Start: 2022-05-31

## 2022-05-31 RX ORDER — LISINOPRIL 20 MG/1
TABLET ORAL
COMMUNITY
Start: 2022-03-22 | End: 2022-06-06 | Stop reason: ALTCHOICE

## 2022-05-31 RX ORDER — BLOOD-GLUCOSE TRANSMITTER
EACH MISCELLANEOUS
COMMUNITY
Start: 2021-08-19 | End: 2022-09-16 | Stop reason: SDUPTHER

## 2022-05-31 RX ORDER — SITAGLIPTIN 100 MG/1
100 TABLET, FILM COATED ORAL DAILY
Qty: 90 TABLET | Refills: 3 | Status: SHIPPED | OUTPATIENT
Start: 2022-05-31

## 2022-05-31 RX ORDER — FINERENONE 10 MG/1
10 TABLET, FILM COATED ORAL DAILY
Qty: 90 TABLET | Refills: 3 | Status: SHIPPED | OUTPATIENT
Start: 2022-05-31

## 2022-05-31 RX ORDER — BLOOD-GLUCOSE SENSOR
EACH MISCELLANEOUS
COMMUNITY
Start: 2021-08-19 | End: 2022-07-26 | Stop reason: SDUPTHER

## 2022-05-31 RX ORDER — INSULIN LISPRO 100 [IU]/ML
INJECTION, SOLUTION INTRAVENOUS; SUBCUTANEOUS
COMMUNITY
End: 2022-09-19

## 2022-05-31 RX ORDER — ERGOCALCIFEROL (VITAMIN D2) 1250 MCG
50000 CAPSULE ORAL
Qty: 12 CAPSULE | Refills: 3 | Status: SHIPPED | OUTPATIENT
Start: 2022-06-02

## 2022-05-31 RX ORDER — INSULIN DEGLUDEC 200 U/ML
72 INJECTION, SOLUTION SUBCUTANEOUS NIGHTLY
COMMUNITY
End: 2022-09-19

## 2022-05-31 RX ORDER — EMPAGLIFLOZIN 25 MG/1
25 TABLET, FILM COATED ORAL DAILY
Qty: 90 TABLET | Refills: 3 | Status: SHIPPED | OUTPATIENT
Start: 2022-05-31

## 2022-05-31 RX ORDER — EMPAGLIFLOZIN 25 MG/1
25 TABLET, FILM COATED ORAL DAILY
COMMUNITY
End: 2022-05-31 | Stop reason: SDUPTHER

## 2022-05-31 NOTE — PATIENT INSTRUCTIONS
INSULIN ADMINISTRATION INSTRUCTIONS:    Patient Name:  Evangeline Opitz   YOB: 1975    Provider Name:  Amy Hook PA-C  Today's Date:  05/31/22    Orals  jardiance 25mg in am  Lisinopril 20mg  Metformin 750mg with breakfast      LONG-ACTING INSULIN     Tresiba    30 units daily    The purpose of long-acting insulin is to try to achieve fasting (first thing in the morning) blood glucose in the morning . If fasting blood glucose is persistently above 125, increase the long-acting insulin dose by 4 units every 4 days until fasting blood glucose is persistently . If fasting blood glucose is persistently less than 80, decrease the long-acting insulin dose by 4 units every 4 days until fasting blood glucose is persistently . Leave the dose at whichever dose it takes to keep fasting blood glucose . SHORT-ACTING INSULIN     Humalog    15 before each meal        Be sure to check blood glucose before meals and at bedtime. Based on the blood glucose reading, take the following amount of insulin:    Blood glucose 150 or less  No extra insulin  Blood glucose 151-175  1 units  Blood glucose 176-200  2 units  Blood glucose 201-225  3 units  Blood glucose 226-250  4 units  Blood glucose 251-275  5 units  Blood glucose  276-300  6 units  Blood glucose 301-325  7 units  Blood glucose  326-350  8 units  Blood glucose greater than 350 10 units    If there are questions, send an electronic Health [facility] We [facility] message to for nonurgent questions or call the office at [phone].     [facility] ENDOCRINOLOGY  [address] [facility] [place] [address]

## 2022-06-01 LAB
25(OH)D3 SERPL-MCNC: 16.5 NG/ML (ref 30–100)
ALBUMIN SERPL-MCNC: 2.6 G/DL (ref 3.5–5)
ALBUMIN/GLOB SERPL: 0.8 {RATIO} (ref 1.2–3.5)
ALP SERPL-CCNC: 97 U/L (ref 50–136)
ALT SERPL-CCNC: 11 U/L (ref 12–65)
ANION GAP SERPL CALC-SCNC: 7 MMOL/L (ref 7–16)
AST SERPL-CCNC: 7 U/L (ref 15–37)
BILIRUB SERPL-MCNC: 0.5 MG/DL (ref 0.2–1.1)
BUN SERPL-MCNC: 20 MG/DL (ref 6–23)
CALCIUM SERPL-MCNC: 8.4 MG/DL (ref 8.3–10.4)
CHLORIDE SERPL-SCNC: 96 MMOL/L (ref 98–107)
CO2 SERPL-SCNC: 27 MMOL/L (ref 21–32)
CREAT SERPL-MCNC: 1.2 MG/DL (ref 0.6–1)
CREAT UR-MCNC: 54 MG/DL
GLOBULIN SER CALC-MCNC: 3.3 G/DL (ref 2.3–3.5)
GLUCOSE SERPL-MCNC: 446 MG/DL (ref 65–100)
MICROALBUMIN UR-MCNC: 561 MG/DL
MICROALBUMIN/CREAT UR-RTO: ABNORMAL MG/G
POTASSIUM SERPL-SCNC: 4.1 MMOL/L (ref 3.5–5.1)
PROT SERPL-MCNC: 5.9 G/DL (ref 6.3–8.2)
SODIUM SERPL-SCNC: 130 MMOL/L (ref 136–145)

## 2022-06-02 NOTE — PROGRESS NOTES
Constanza message:    I hope you had a happy birthday and that tomorrow's last day of school is liberating for you. I am very concerned about your labs, specifically your kidney function. I believe the current meal has been approved and I would like you to start this right away. I have ordered a potassium lab that I need to do repeat in about 2 weeks. Your kidney function has declined, the amount of protein in your urine has increased, and the amount of protein in your blood is now dropping because you are losing it through your kidneys. I am going to refer you to a kidney specialist and it is critical that you make and go to this appointment. It is also EXTREMELY important for you to take your medication to better control your blood sugar. Please work on the things we discussed. Let me know if you have any issues with any of the meds, including obtaining or tolerating these. Let's plan for walk-in afternoon labs in my building after you have taken the kerendia for 2 weeks.     Take care,  ~Rhonda

## 2022-06-06 ENCOUNTER — OFFICE VISIT (OUTPATIENT)
Dept: NEUROLOGY | Age: 47
End: 2022-06-06
Payer: COMMERCIAL

## 2022-06-06 VITALS — WEIGHT: 168 LBS | BODY MASS INDEX: 26.31 KG/M2 | DIASTOLIC BLOOD PRESSURE: 100 MMHG | SYSTOLIC BLOOD PRESSURE: 164 MMHG

## 2022-06-06 DIAGNOSIS — E88.9 METABOLIC DISORDER: ICD-10-CM

## 2022-06-06 DIAGNOSIS — G62.9 POLYNEUROPATHY: ICD-10-CM

## 2022-06-06 DIAGNOSIS — G45.9 TIA (TRANSIENT ISCHEMIC ATTACK): Primary | ICD-10-CM

## 2022-06-06 DIAGNOSIS — E87.1 HYPONATREMIA: ICD-10-CM

## 2022-06-06 PROCEDURE — 99215 OFFICE O/P EST HI 40 MIN: CPT | Performed by: PSYCHIATRY & NEUROLOGY

## 2022-06-06 SDOH — HEALTH STABILITY: PHYSICAL HEALTH: ON AVERAGE, HOW MANY MINUTES DO YOU ENGAGE IN EXERCISE AT THIS LEVEL?: 0 MIN

## 2022-06-06 SDOH — HEALTH STABILITY: PHYSICAL HEALTH: ON AVERAGE, HOW MANY DAYS PER WEEK DO YOU ENGAGE IN MODERATE TO STRENUOUS EXERCISE (LIKE A BRISK WALK)?: 0 DAYS

## 2022-06-06 ASSESSMENT — ENCOUNTER SYMPTOMS
GASTROINTESTINAL NEGATIVE: 1
RESPIRATORY NEGATIVE: 1
EYES NEGATIVE: 1
ALLERGIC/IMMUNOLOGIC NEGATIVE: 1

## 2022-06-06 ASSESSMENT — VISUAL ACUITY: OU: 1

## 2022-06-06 NOTE — PROGRESS NOTES
NEUROLOGY  RETURN  OFFICE VISIT [de-identified]                     2022  Emperatriz Roman is a 56 y.o. female here for [de-identified]  RTO followup from 2021        Referred by Dr Jane Wilson  (now retired). Chief Complaint:   Spell lasting about 10 minutes a month ago or so of not being able to find the right word and actually thinking that she had the right word where her speech substituted a different word. This lasted for about 10 minutes without any other paralysis paresthesia or changes. She did not have any lightheadedness during that. Compatible with a TIA. Her headaches have dissipated mostly and are fairly controlled. No previous such episode occurred but she did have some recent blood test that might shed more light on this. 57 yo accompanied by family member who corroborates that she has severe diabetes. Patient is aware. Her A1c has been as high as 12. Recently she her blood tests including a CMP was very positive for hyponatremia and other disturbances now her creatinine is 1.20. I discussed all of this with her at length. Previous TIA. Active Problems:    * No active hospital problems. *  Resolved Problems:    * No resolved hospital problems.  *    Past Medical History:   Diagnosis Date    Chronic kidney disease     Diabetes ([address]     Diabetic peripheral neuropathy associated with type 2 diabetes mellitus ([address]     Eczema     Hypertension     Nonproliferative diabetic retinopathy ([address]     Psoriasis     Type 2 diabetes mellitus, uncontrolled ([address]      Past Surgical History:   Procedure Laterality Date    CATARACT REMOVAL Right 2021    CATARACT REMOVAL Left 2021     SECTION   and     ORTHOPEDIC SURGERY  2020    Right 5th Toe Amputated    OTHER SURGICAL HISTORY      cyst removed from neck    UPPER GASTROINTESTINAL ENDOSCOPY  2021    clear      Family History   Problem Relation Age of Onset    Cancer Maternal Grandfather lung (smoker)    Diabetes Maternal Grandfather     Cancer Paternal Grandfather         lung (smoker)    Breast Cancer Maternal Grandmother     Crohn's Disease Paternal Grandmother      Social History     Socioeconomic History    Marital status:      Spouse name: None    Number of children: None    Years of education: None    Highest education level: None   Occupational History    None   Tobacco Use    Smoking status: Former Smoker     Packs/day: 0.50     Quit date: 2007     Years since quittin.5    Smokeless tobacco: Never Used   Substance and Sexual Activity    Alcohol use: No     Alcohol/week: 0.0 standard drinks    Drug use: No    Sexual activity: None   Other Topics Concern    None   Social History Narrative    None     Social Determinants of Health     Financial Resource Strain:     Difficulty of Paying Living Expenses: Not on file   Food Insecurity:     Worried About Running Out of Food in the Last Year: Not on file    Omer of Food in the Last Year: Not on file   Transportation Needs:     Lack of Transportation (Medical): Not on file    Lack of Transportation (Non-Medical):  Not on file   Physical Activity:     Days of Exercise per Week: Not on file    Minutes of Exercise per Session: Not on file   Stress:     Feeling of Stress : Not on file   Social Connections:     Frequency of Communication with Friends and Family: Not on file    Frequency of Social Gatherings with Friends and Family: Not on file    Attends Jehovah's witness Services: Not on file    Active Member of Clubs or Organizations: Not on file    Attends Club or Organization Meetings: Not on file    Marital Status: Not on file   Intimate Partner Violence:     Fear of Current or Ex-Partner: Not on file    Emotionally Abused: Not on file    Physically Abused: Not on file    Sexually Abused: Not on file   Housing Stability:     Unable to Pay for Housing in the Last Year: Not on file    Number of Places Lived in the Last Year: Not on file    Unstable Housing in the Last Year: Not on file        Current Outpatient Medications   Medication Sig Dispense Refill    Continuous Blood Gluc Sensor (DEXCOM G6 SENSOR) MISC Use to monitor blood glucose, E11.65      Continuous Blood Gluc Transmit (DEXCOM G6 TRANSMITTER) MISC Use to monitor blood glucose, E11.65      TRESIBA FLEXTOUCH 200 UNIT/ML SOPN Inject 72 Units into the skin at bedtime      HUMALOG KWIKPEN 100 UNIT/ML SOPN Inject into the skin Take 20 units before each meal plus correction 3/50 >150, max daily dose 100 units      JARDIANCE 25 MG tablet Take 1 tablet by mouth daily 90 tablet 3    ergocalciferol (ERGOCALCIFEROL) 1.25 MG (86514 UT) capsule Take 1 capsule by mouth Twice a Week 12 capsule 3    metFORMIN (GLUCOPHAGE-XR) 750 mg extended release tablet Take 1 tablet by mouth daily 90 tablet 3    KERENDIA 10 MG TABS Take 10 mg by mouth daily 90 tablet 3    JANUVIA 100 MG tablet Take 1 tablet by mouth daily 90 tablet 3    atorvastatin (LIPITOR) 40 MG tablet Take 40 mg by mouth daily       No current facility-administered medications for this visit. No Known Allergies    REVIEW OTHER RECORDS:    Review of Systems   Constitutional: Negative. HENT: Positive for tinnitus. Eyes: Negative. Respiratory: Negative. Cardiovascular: Negative. Gastrointestinal: Negative. Endocrine: Negative. Genitourinary: Negative. Musculoskeletal: Negative. Skin: Negative. Allergic/Immunologic: Negative. Neurological: Positive for speech difficulty (one TIA spell 10 min), numbness and headaches. Negative for dizziness, tremors, seizures, syncope, facial asymmetry, weakness and light-headedness. Hematological: Negative. Psychiatric/Behavioral: Negative. All other systems reviewed and are negative.          REVIEW IMAGING:     Objective:     Vitals:    06/06/22 1524   BP: (!) 164/100   Weight: 168 lb (76.2 kg)        Physical Exam  Vitals reviewed. Constitutional:       General: She is awake. She is not in acute distress. Appearance: She is well-developed and well-groomed. She is not ill-appearing, toxic-appearing or diaphoretic. HENT:      Head: Normocephalic and atraumatic. No raccoon eyes, abrasion, contusion, right periorbital erythema or left periorbital erythema. Right Ear: Hearing normal.      Left Ear: Hearing normal.   Eyes:      General: Lids are normal. Vision grossly intact. No scleral icterus. Right eye: No discharge. Left eye: No discharge. Extraocular Movements: Extraocular movements intact. Right eye: Normal extraocular motion and no nystagmus. Left eye: Normal extraocular motion and no nystagmus. Conjunctiva/sclera: Conjunctivae normal.      Right eye: Right conjunctiva is not injected. Left eye: Left conjunctiva is not injected. Pupils: Pupils are equal, round, and reactive to light. Neck:      Trachea: Phonation normal.   Pulmonary:      Effort: Pulmonary effort is normal. No respiratory distress. Breath sounds: No wheezing. Musculoskeletal:         General: No swelling, tenderness, deformity or signs of injury. Normal range of motion. Cervical back: Normal range of motion. No rigidity or torticollis. Normal range of motion. Right lower leg: No edema. Left lower leg: No edema. Skin:     General: Skin is warm and dry. Capillary Refill: Capillary refill takes less than 2 seconds. Coloration: Skin is not cyanotic or jaundiced. Nails: There is no clubbing. Neurological:      Mental Status: She is alert and easily aroused. Mental status is at baseline. Cranial Nerves: No cranial nerve deficit, dysarthria or facial asymmetry. Sensory: Sensory deficit present. Motor: No weakness, tremor or seizure activity. Coordination: Coordination is intact.  Coordination normal.      Gait: Gait normal.      Comments: Movements good except left drop foot. Psychiatric:         Mood and Affect: Mood normal.         Behavior: Behavior normal. Behavior is cooperative. Neurologic Exam     Mental Status   Attention: normal. Concentration: normal.   Level of consciousness: alert  Knowledge: good and consistent with education. Normal comprehension. Cranial Nerves     CN III, IV, VI   Pupils are equal, round, and reactive to light. Gait, Coordination, and Reflexes     Gait  Gait: wide-based    Tremor   Resting tremor: absent  Intention tremor: absent  Action tremor: absent    Reflexes   Right Millard: absent  Left Millard: absent    There is no tic, twitch, tonic or clonic activity noted. Assessment & Plan     Cece Stafford was seen today for follow-up and neurologic problem. Diagnoses and all orders for this visit:    TIA (transient ischemic attack)  -     MRI BRAIN W WO CONTRAST; Future  -     Vascular duplex carotid bilateral; Future    Metabolic disorder    Hyponatremia    Polyneuropathy    1. Work on TIA first w MRI and Carotid US. 2.   Also metabolic profile w hyponatremia and high sugars. .. but she is seeing her internist tomorrow. Discussed with pt coordination and importance considering this might have been TIA (etiology embolism, ischemia, but also can see w hyperglycemia and also w seizure.)    All drugs and rx reviewed fully with patient and acknowledged spcific to neurology as well. Differential diagnostic decisions and thoughts reviewed and discussed. Updating to ID pt, coordinate neurology visits, reasons for follow, review neurologic problems. Extensive time[de-identified]  Total time  44 minutes -       More than 50% of this visit was spent in counseling and care coordination. Treatment options fully reviewed with patient. Time includes pre-  and post- face-face time in records review, and preparation including available pertinent images and reports. Acknowledgements obtained where needed.    [ *NOTE: parts of this note were produced using artificial speech recognition software, which may have inadvertent errors in the produced wordings. ]          Ruslan Jaquez MD  Consultative Neurology, [address] [facility]   [address], [address] [place] [address]  Phone:  [phone]  Fax:   [phone]        Signed By: Ruslan Jaquez MD     June 6, 2022

## 2022-06-06 NOTE — PATIENT INSTRUCTIONS
Patient Education        Transient Ischemic Attack: Care Instructions  Overview     A transient ischemic attack (TIA) is when blood flow to a part of your brain is blocked for a short time. A TIA causes stroke symptoms that can last for at least a few minutes. Stroke symptoms include sudden weakness or loss of movement in a part of your body, confusion, vision changes, trouble speaking, and trouble walking or balancing. But unlike a stroke, a TIA doesn't causelasting brain damage. TIAs are often warning signs of a stroke. Some people who have a TIA may have a stroke in the future. If you have symptoms of a stroke, call for emergency help right away. Quick treatment can help limit damage to the brain and increase thechance of recovery. You can take steps to help prevent a stroke. These steps include managing health problems that raise your risk, taking medicine that prevents blood clots, and having a heart-healthy lifestyle. This lifestyle includes beingactive, eating healthy foods, staying at a healthy weight, and not smoking. Follow-up care is a key part of your treatment and safety. Be sure to make and go to all appointments, and call your doctor if you are having problems. It's also a good idea to know your test results and keep alist of the medicines you take. How can you care for yourself at home? Medicines     Be safe with medicines. Take your medicines exactly as prescribed. Call your doctor if you think you are having a problem with your medicine.      If you take a blood thinner, such as aspirin, be sure you get instructions about how to take your medicine safely. Blood thinners can cause serious bleeding problems.      Call your doctor if you are not able to take your medicines for any reason.      Do not take any over-the-counter medicines or herbal products without talking to your doctor first.      If you use hormonal birth control or hormone therapy, talk to your doctor.  Ask if these are right for you. They may raise the risk of stroke in some people. Heart-healthy lifestyle     Do not smoke. If you need help quitting, talk to your doctor about stop-smoking programs and medicines.      Be active. If your doctor recommends it, get more exercise. Walking is a good choice. Bit by bit, increase the amount you walk every day. Try for at least 30 minutes on most days of the week. You also may want to swim, bike, or do other activities.      Eat heart-healthy foods. These include vegetables, fruits, nuts, beans, lean meat, fish, and whole grains. Limit sodium and sugar.      Stay at a healthy weight. Lose weight if you need to.      Limit alcohol to 2 drinks a day for men and 1 drink a day for women. Staying healthy     Manage other health problems that raise your risk of stroke. These include atrial fibrillation, diabetes, high blood pressure, and high cholesterol.      If you think you may have a problem with alcohol or drug use, talk to your doctor.      Get the flu vaccine every year. When should you call for help? Call 911 anytime you think you may need emergency care. For example, call if:     You have symptoms of a stroke. These may include:  ? Sudden numbness, tingling, weakness, or loss of movement in your face, arm, or leg, especially on only one side of your body. ? Sudden vision changes. ? Sudden trouble speaking. ? Sudden confusion or trouble understanding simple statements. ? Sudden problems with walking or balance. ? A sudden, severe headache that is different from past headaches. Call 911 even if these symptoms go away in a few minutes.      You feel like you are having another TIA. Watch closely for changes in your health, and be sure to contact your doctor ifyou have any problems. Where can you learn more? Go to https://lacey.Digital Message Display. org and sign in to your Globa.li account.  Enter [phone] in the  box to learn more about \"Transient Ischemic Attack: Care Instructions. \"     If you do not have an account, please click on the \"Sign Up Now\" link. Current as of: July 6, 2021               Content Version: 13.2  © 2006-2022 [employer]. Care instructions adapted under license by [facility] ([facility]). If you have questions about a medical condition or this instruction, always ask your healthcare professional. [address] any warranty or liability for your use of this information. Patient Education        Hyponatremia: Care Instructions  Your Care Instructions     Hyponatremia (say \"zl-tk-wlr-TREE-eva-uh\") means that you don't have enough sodium in your blood. It can cause nausea, vomiting, and headaches. Or you maynot feel hungry. In serious cases, it can cause seizures, a coma, or even death. Hyponatremia is not a disease. It is a problem caused by something else, suchas medicines or exercising for a long time in hot weather. You can get hyponatremia if you lose a lot of fluids and then you drink a lot of water or other liquids that don't have much sodium. You can also get it ifyou have kidney, liver, heart, or other health problems. Treatment is focused on getting your sodium levels back to normal.  Follow-up care is a key part of your treatment and safety. Be sure to make and go to all appointments, and call your doctor if you are having problems. It's also a good idea to know your test results and keep alist of the medicines you take. How can you care for yourself at home?  If your doctor recommends it, drink fluids that have sodium. Sports drinks are a good choice. Or you can eat salty foods.  If your doctor recommends it, limit the amount of water you drink. And limit fluids that are mostly water. These include tea, coffee, and juice.  Take your medicines exactly as prescribed. Call your doctor if you have any problems with your medicine.    Get your sodium levels tested when your doctor tells you to. When should you call for help? Call 911 anytime you think you may need emergency care. For example, call if:     You have a seizure.      You passed out (lost consciousness). Call your doctor now or seek immediate medical care if:     You are confused or it is hard to focus.      You have little or no appetite.      You feel sick to your stomach or you vomit.      You have a headache.      You have mood changes.      You feel more tired than usual.   Watch closely for changes in your health, and be sure to contact your doctor if:     You do not get better as expected. Where can you learn more? Go to https://Smart Ecosystemspepiceweb.Socitive. org and sign in to your Airware account. Enter Q354 in the Loggly box to learn more about \"Hyponatremia: Care Instructions. \"     If you do not have an account, please click on the \"Sign Up Now\" link. Current as of: June 17, 2021               Content Version: 13.2  © 2006-2022 [employer]. Care instructions adapted under license by [facility] ([facility]). If you have questions about a medical condition or this instruction, always ask your healthcare professional. [place] [address] any warranty or liability for your use of this information. Patient Education        Transient Ischemic Attack: Care Instructions  Overview     A transient ischemic attack (TIA) is when blood flow to a part of your brain is blocked for a short time. A TIA causes stroke symptoms that can last for at least a few minutes. Stroke symptoms include sudden weakness or loss of movement in a part of your body, confusion, vision changes, trouble speaking, and trouble walking or balancing. But unlike a stroke, a TIA doesn't causelasting brain damage. TIAs are often warning signs of a stroke. Some people who have a TIA may have a stroke in the future. If you have symptoms of a stroke, call for emergency help right away.  Quick treatment can help limit damage to the brain and increase thechance of recovery. You can take steps to help prevent a stroke. These steps include managing health problems that raise your risk, taking medicine that prevents blood clots, and having a heart-healthy lifestyle. This lifestyle includes beingactive, eating healthy foods, staying at a healthy weight, and not smoking. Follow-up care is a key part of your treatment and safety. Be sure to make and go to all appointments, and call your doctor if you are having problems. It's also a good idea to know your test results and keep alist of the medicines you take. How can you care for yourself at home? Medicines     Be safe with medicines. Take your medicines exactly as prescribed. Call your doctor if you think you are having a problem with your medicine.      If you take a blood thinner, such as aspirin, be sure you get instructions about how to take your medicine safely. Blood thinners can cause serious bleeding problems.      Call your doctor if you are not able to take your medicines for any reason.      Do not take any over-the-counter medicines or herbal products without talking to your doctor first.      If you use hormonal birth control or hormone therapy, talk to your doctor. Ask if these are right for you. They may raise the risk of stroke in some people. Heart-healthy lifestyle     Do not smoke. If you need help quitting, talk to your doctor about stop-smoking programs and medicines.      Be active. If your doctor recommends it, get more exercise. Walking is a good choice. Bit by bit, increase the amount you walk every day. Try for at least 30 minutes on most days of the week. You also may want to swim, bike, or do other activities.      Eat heart-healthy foods. These include vegetables, fruits, nuts, beans, lean meat, fish, and whole grains. Limit sodium and sugar.      Stay at a healthy weight.  Lose weight if you need to.      Limit alcohol to 2 drinks a day for men and 1 drink a day for women. Staying healthy     Manage other health problems that raise your risk of stroke. These include atrial fibrillation, diabetes, high blood pressure, and high cholesterol.      If you think you may have a problem with alcohol or drug use, talk to your doctor.      Get the flu vaccine every year. When should you call for help? Call 911 anytime you think you may need emergency care. For example, call if:     You have symptoms of a stroke. These may include:  ? Sudden numbness, tingling, weakness, or loss of movement in your face, arm, or leg, especially on only one side of your body. ? Sudden vision changes. ? Sudden trouble speaking. ? Sudden confusion or trouble understanding simple statements. ? Sudden problems with walking or balance. ? A sudden, severe headache that is different from past headaches. Call 911 even if these symptoms go away in a few minutes.      You feel like you are having another TIA. Watch closely for changes in your health, and be sure to contact your doctor ifyou have any problems. Where can you learn more? Go to https://Futuristic Data Management.Arriba Cooltech. org and sign in to your Piczo account. Enter [phone] in the [facility] box to learn more about \"Transient Ischemic Attack: Care Instructions. \"     If you do not have an account, please click on the \"Sign Up Now\" link. Current as of: July 6, 2021               Content Version: 13.2  © 3401-8933 [employer], [facility]. Care instructions adapted under license by [facility] ([facility]). If you have questions about a medical condition or this instruction, always ask your healthcare professional. [place] [address] any warranty or liability for your use of this information.

## 2022-06-07 ENCOUNTER — OFFICE VISIT (OUTPATIENT)
Dept: INTERNAL MEDICINE CLINIC | Facility: CLINIC | Age: 47
End: 2022-06-07
Payer: COMMERCIAL

## 2022-06-07 VITALS
BODY MASS INDEX: 26.43 KG/M2 | WEIGHT: 168.4 LBS | DIASTOLIC BLOOD PRESSURE: 84 MMHG | HEIGHT: 67 IN | SYSTOLIC BLOOD PRESSURE: 140 MMHG

## 2022-06-07 DIAGNOSIS — E78.2 MIXED HYPERLIPIDEMIA: ICD-10-CM

## 2022-06-07 DIAGNOSIS — E11.29 TYPE 2 DIABETES MELLITUS WITH MICROALBUMINURIA, WITH LONG-TERM CURRENT USE OF INSULIN (HCC): ICD-10-CM

## 2022-06-07 DIAGNOSIS — E11.3299 NONPROLIFERATIVE DIABETIC RETINOPATHY (HCC): ICD-10-CM

## 2022-06-07 DIAGNOSIS — E87.1 HYPONATREMIA: Primary | ICD-10-CM

## 2022-06-07 DIAGNOSIS — E11.42 DIABETIC PERIPHERAL NEUROPATHY ASSOCIATED WITH TYPE 2 DIABETES MELLITUS (HCC): ICD-10-CM

## 2022-06-07 DIAGNOSIS — E87.1 HYPONATREMIA: ICD-10-CM

## 2022-06-07 DIAGNOSIS — Z11.9 SCREENING EXAMINATION FOR INFECTIOUS DISEASE: ICD-10-CM

## 2022-06-07 DIAGNOSIS — R80.9 TYPE 2 DIABETES MELLITUS WITH MICROALBUMINURIA, WITH LONG-TERM CURRENT USE OF INSULIN (HCC): ICD-10-CM

## 2022-06-07 DIAGNOSIS — G45.9 TIA (TRANSIENT ISCHEMIC ATTACK): Primary | ICD-10-CM

## 2022-06-07 DIAGNOSIS — Z79.4 TYPE 2 DIABETES MELLITUS WITH MICROALBUMINURIA, WITH LONG-TERM CURRENT USE OF INSULIN (HCC): ICD-10-CM

## 2022-06-07 DIAGNOSIS — G45.9 TIA (TRANSIENT ISCHEMIC ATTACK): ICD-10-CM

## 2022-06-07 PROCEDURE — 93000 ELECTROCARDIOGRAM COMPLETE: CPT | Performed by: INTERNAL MEDICINE

## 2022-06-07 PROCEDURE — 3046F HEMOGLOBIN A1C LEVEL >9.0%: CPT | Performed by: INTERNAL MEDICINE

## 2022-06-07 PROCEDURE — 99204 OFFICE O/P NEW MOD 45 MIN: CPT | Performed by: INTERNAL MEDICINE

## 2022-06-07 ASSESSMENT — PATIENT HEALTH QUESTIONNAIRE - PHQ9
2. FEELING DOWN, DEPRESSED OR HOPELESS: 0
SUM OF ALL RESPONSES TO PHQ QUESTIONS 1-9: 0
SUM OF ALL RESPONSES TO PHQ9 QUESTIONS 1 & 2: 0
SUM OF ALL RESPONSES TO PHQ QUESTIONS 1-9: 0
1. LITTLE INTEREST OR PLEASURE IN DOING THINGS: 0

## 2022-06-07 ASSESSMENT — ENCOUNTER SYMPTOMS
PHOTOPHOBIA: 0
SHORTNESS OF BREATH: 0
WHEEZING: 0
CHEST TIGHTNESS: 0
ABDOMINAL DISTENTION: 0

## 2022-06-08 ENCOUNTER — PATIENT MESSAGE (OUTPATIENT)
Dept: INTERNAL MEDICINE CLINIC | Facility: CLINIC | Age: 47
End: 2022-06-08

## 2022-06-08 ENCOUNTER — TELEPHONE (OUTPATIENT)
Dept: INTERNAL MEDICINE CLINIC | Facility: CLINIC | Age: 47
End: 2022-06-08

## 2022-06-08 DIAGNOSIS — R80.9 TYPE 2 DIABETES MELLITUS WITH MICROALBUMINURIA, WITH LONG-TERM CURRENT USE OF INSULIN (HCC): ICD-10-CM

## 2022-06-08 DIAGNOSIS — E87.1 HYPONATREMIA: Primary | ICD-10-CM

## 2022-06-08 DIAGNOSIS — E11.29 TYPE 2 DIABETES MELLITUS WITH MICROALBUMINURIA, WITH LONG-TERM CURRENT USE OF INSULIN (HCC): ICD-10-CM

## 2022-06-08 DIAGNOSIS — Z79.4 TYPE 2 DIABETES MELLITUS WITH MICROALBUMINURIA, WITH LONG-TERM CURRENT USE OF INSULIN (HCC): ICD-10-CM

## 2022-06-08 LAB
ALBUMIN SERPL-MCNC: 2.8 G/DL (ref 3.5–5)
ALBUMIN/GLOB SERPL: 0.8 {RATIO} (ref 1.2–3.5)
ALP SERPL-CCNC: 120 U/L (ref 50–136)
ALT SERPL-CCNC: 12 U/L (ref 12–65)
ANION GAP SERPL CALC-SCNC: 7 MMOL/L (ref 7–16)
AST SERPL-CCNC: 7 U/L (ref 15–37)
BILIRUB SERPL-MCNC: 0.4 MG/DL (ref 0.2–1.1)
BUN SERPL-MCNC: 17 MG/DL (ref 6–23)
CALCIUM SERPL-MCNC: 8.6 MG/DL (ref 8.3–10.4)
CHLORIDE SERPL-SCNC: 96 MMOL/L (ref 98–107)
CHOLEST SERPL-MCNC: 151 MG/DL
CO2 SERPL-SCNC: 26 MMOL/L (ref 21–32)
CREAT SERPL-MCNC: 1.1 MG/DL (ref 0.6–1)
GLOBULIN SER CALC-MCNC: 3.7 G/DL (ref 2.3–3.5)
GLUCOSE SERPL-MCNC: 462 MG/DL (ref 65–100)
HCV AB SER QL: NONREACTIVE
HDLC SERPL-MCNC: 37 MG/DL (ref 40–60)
HDLC SERPL: 4.1 {RATIO}
HIV 1+2 AB+HIV1 P24 AG SERPL QL IA: NONREACTIVE
HIV 1/2 RESULT COMMENT: NORMAL
LDLC SERPL CALC-MCNC: 56.8 MG/DL
POTASSIUM SERPL-SCNC: 4 MMOL/L (ref 3.5–5.1)
PROT SERPL-MCNC: 6.5 G/DL (ref 6.3–8.2)
SODIUM SERPL-SCNC: 129 MMOL/L (ref 136–145)
TRIGL SERPL-MCNC: 286 MG/DL (ref 35–150)
VLDLC SERPL CALC-MCNC: 57.2 MG/DL (ref 6–23)

## 2022-06-08 NOTE — TELEPHONE ENCOUNTER
----- Message from Daly Laughlin MD sent at 6/8/2022  8:16 AM EDT -----  Please Call her and encourage patient to start her medications as soon as possible , during her visit yesterday she reported not having the medication at home, but they were ready at the pharmacy to be picked up. Her sodium, is low, but corrected based on the sugar level, is around 134. Her kidney function tests are showing some decline, which is associated with her high blood sugars. Her blood sugars are above 400,  Her other test that hepatitis C, HIV testing came back negative, her cholesterol is showing elevated triglycerides, please also remind her to start her atorvastatin.

## 2022-06-08 NOTE — TELEPHONE ENCOUNTER
Called pt and discussed lab results. She picked up her medication last night and has already started them.

## 2022-06-14 ENCOUNTER — TELEPHONE (OUTPATIENT)
Dept: ENDOCRINOLOGY | Age: 47
End: 2022-06-14

## 2022-06-14 DIAGNOSIS — E11.65 UNCONTROLLED TYPE 2 DIABETES MELLITUS WITH HYPERGLYCEMIA (HCC): Primary | ICD-10-CM

## 2022-06-14 RX ORDER — INSULIN PMP CART,AUT,G6/7,CNTR
EACH SUBCUTANEOUS
Qty: 1 KIT | Refills: 0 | Status: SHIPPED | OUTPATIENT
Start: 2022-06-14

## 2022-06-14 RX ORDER — INSULIN PMP CART,AUT,G6/7,CNTR
EACH SUBCUTANEOUS
Qty: 30 EACH | Refills: 3 | Status: SHIPPED | OUTPATIENT
Start: 2022-06-14

## 2022-06-14 NOTE — TELEPHONE ENCOUNTER
Patient's insurance is through [facility] but patient doesn't use their pharmacy. Patient stated that she was fine with using the speciality pharmacy in [facility].

## 2022-06-14 NOTE — TELEPHONE ENCOUNTER
Patient called and stated that provider discussed starting Omnipod. Patient wanted to check on the status of this.

## 2022-06-20 ENCOUNTER — HOSPITAL ENCOUNTER (OUTPATIENT)
Dept: MRI IMAGING | Age: 47
Discharge: HOME | End: 2022-06-23
Payer: COMMERCIAL

## 2022-06-20 DIAGNOSIS — G45.9 TIA (TRANSIENT ISCHEMIC ATTACK): ICD-10-CM

## 2022-06-20 PROCEDURE — 70551 MRI BRAIN STEM W/O DYE: CPT

## 2022-06-21 ENCOUNTER — INITIAL CONSULT (OUTPATIENT)
Dept: CARDIOLOGY CLINIC | Age: 47
End: 2022-06-21
Payer: COMMERCIAL

## 2022-06-21 VITALS
BODY MASS INDEX: 24.92 KG/M2 | DIASTOLIC BLOOD PRESSURE: 88 MMHG | HEART RATE: 93 BPM | HEIGHT: 68 IN | WEIGHT: 164.4 LBS | SYSTOLIC BLOOD PRESSURE: 136 MMHG

## 2022-06-21 DIAGNOSIS — R90.89 ABNORMAL BRAIN MRI: Primary | ICD-10-CM

## 2022-06-21 PROCEDURE — 99214 OFFICE O/P EST MOD 30 MIN: CPT | Performed by: INTERNAL MEDICINE

## 2022-06-21 ASSESSMENT — ENCOUNTER SYMPTOMS
ABDOMINAL PAIN: 0
EYE PAIN: 0
NAIL CHANGES: 0
STRIDOR: 0
COUGH: 0
APHONIA: 0

## 2022-06-21 NOTE — PROGRESS NOTES
[address], [address] [employer] [facility], [address] [place] [address]  PHONE: [phone]    SUBJECTIVE:   Shauna Ram is a 56 y.o. female 1975   seen for a consultation visit regarding the following:     Chief Complaint   Patient presents with    Consultation     Mixed hyperlipidemia, TIA              Consultation is requested by  for evaluation of Consultation (Mixed hyperlipidemia, TIA)   . History of present illness: 56 y.o. female  right handed female described transient  word finding difficulty. Hx of Drop foot previous orthopedic surgery. Patient states symptoms lasted several minutes denied unilateral or bilateral weakness sensory or motor changes. MRI was ordered following her appointment with neurology. She has a history of headaches  Cardiac History:  · 2022 MRI brain small foci of subcortical white matter signal abnormality nonspecific? Chronic microvascular ischemia or chronic migraine    Assessment:  Abnormal MRI. · Previous event with word finding difficulty differential diagnosis per neurology was possible migraine versus TIA. Abnormalities are noted on previous MRI. Message sent to patient's neurologist determine appropriate cardiac work-up is recommended. · Further cardiac would be appropriate if patient had previous infarct. We discussed additional testing with cardiac monitor possibly tramsesophageal echocardiogram for further evaluation. Lab Results   Component Value Date    LABA1C 11.4 (H) 2022     Lab Results   Component Value Date     2022       Past Medical History, Past Surgical History, Family history, Social History, and Medications were all reviewed with the patient today and updated as necessary.        No Known Allergies  Past Medical History:   Diagnosis Date    Chronic kidney disease     Diabetes ([facility]     Diabetic peripheral neuropathy associated with type 2 diabetes mellitus (HCC)     Eczema     Hypertension     Nonproliferative diabetic retinopathy ([facility]     Psoriasis     Type 2 diabetes mellitus, uncontrolled ([facility] [facility]      Past Surgical History:   Procedure Laterality Date    CATARACT REMOVAL Right 2021    CATARACT REMOVAL Left 2021     SECTION   and    Floydene Ada EYE SURGERY  2021 May and     ORTHOPEDIC SURGERY  2020    Right 5th Toe Amputated    OTHER SURGICAL HISTORY      cyst removed from neck    UPPER GASTROINTESTINAL ENDOSCOPY  2021    clear     Family History   Problem Relation Age of Onset    Cancer Maternal Grandfather         lung (smoker)    Diabetes Maternal Grandfather     Cancer Paternal Grandfather         lung (smoker)    Breast Cancer Maternal Grandmother     Crohn's Disease Paternal Grandmother     Diabetes Father     Heart Attack Father     Heart Disease Father     Allergy (Severe) Brother     Asthma Brother      Social History     Tobacco Use    Smoking status: Former Smoker     Packs/day: 0.50     Quit date: 2007     Years since quittin.5    Smokeless tobacco: Never Used   Substance Use Topics    Alcohol use: No     Alcohol/week: 0.0 standard drinks       ROS:    Review of Systems   Constitutional: Negative for fever. HENT: Negative for stridor. Eyes: Negative for pain. Cardiovascular: Negative for chest pain. Respiratory: Negative for cough. Endocrine: Negative for cold intolerance. Skin: Negative for nail changes. Musculoskeletal: Negative for arthritis. Gastrointestinal: Negative for abdominal pain. Genitourinary: Negative for dysuria. Neurological: Negative for aphonia. Psychiatric/Behavioral: Negative for altered mental status. Allergic/Immunologic: Negative for hives. PHYSICAL EXAM:   Ht 5' 8\" (1.727 m)   Wt 164 lb 6.4 oz (74.6 kg)   BMI 25.00 kg/m²      Physical Exam  Vitals reviewed. HENT:      Head: Normocephalic.       Right Ear: External ear normal.      Left Ear: External ear normal.      Nose: Nose normal.   Eyes:      General: No scleral icterus. Pulmonary:      Effort: Pulmonary effort is normal.   Abdominal:      General: There is no distension. Musculoskeletal:      Cervical back: Neck supple. Skin:     General: Skin is warm. Neurological:      Mental Status: She is alert. Mental status is at baseline. Medical problems and test results were reviewed with the patient today. No results found for any visits on 06/21/22.       Recent Results (from the past 672 hour(s))   AMB POC HEMOGLOBIN A1C    Collection Time: 05/31/22  3:36 PM   Result Value Ref Range    Hemoglobin A1C, POC 13.1 %   Microalbumin / Creatinine Urine Ratio    Collection Time: 05/31/22  4:49 PM   Result Value Ref Range    Microalbumin, Random Urine 561.00 (H) <2.0 MG/DL    Creatinine, Ur 54.00 mg/dL    Microalbumin Creatinine Ratio 57125 mg/g   Vitamin D 25 Hydroxy    Collection Time: 05/31/22  4:49 PM   Result Value Ref Range    Vit D, 25-Hydroxy 16.5 (L) 30.0 - 100.0 ng/mL   Comprehensive Metabolic Panel    Collection Time: 05/31/22  4:49 PM   Result Value Ref Range    Sodium 130 (L) 136 - 145 mmol/L    Potassium 4.1 3.5 - 5.1 mmol/L    Chloride 96 (L) 98 - 107 mmol/L    CO2 27 21 - 32 mmol/L    Anion Gap 7 7 - 16 mmol/L    Glucose 446 (H) 65 - 100 mg/dL    BUN 20 6 - 23 MG/DL    CREATININE 1.20 (H) 0.6 - 1.0 MG/DL    GFR African American >60 >60 ml/min/1.73m2    GFR Non- 52 (L) >60 ml/min/1.73m2    Calcium 8.4 8.3 - 10.4 MG/DL    Total Bilirubin 0.5 0.2 - 1.1 MG/DL    ALT 11 (L) 12 - 65 U/L    AST 7 (L) 15 - 37 U/L    Alk Phosphatase 97 50 - 136 U/L    Total Protein 5.9 (L) 6.3 - 8.2 g/dL    Albumin 2.6 (L) 3.5 - 5.0 g/dL    Globulin 3.3 2.3 - 3.5 g/dL    Albumin/Globulin Ratio 0.8 (L) 1.2 - 3.5     Hepatitis C Antibody    Collection Time: 06/07/22  1:04 PM   Result Value Ref Range    Hepatitis C Ab NONREACTIVE NONREACTIVE     HIV 1/2 Ag/Ab, 4TH Generation,W Rflx Confirm    Collection Time: 06/07/22  1:04 PM   Result Value Ref Range    HIV 1/2 Interp NONREACTIVE NONREACTIVE      HIV 1/2 Result Comment SEE NOTE     Comprehensive Metabolic Panel    Collection Time: 06/07/22  1:04 PM   Result Value Ref Range    Sodium 129 (L) 136 - 145 mmol/L    Potassium 4.0 3.5 - 5.1 mmol/L    Chloride 96 (L) 98 - 107 mmol/L    CO2 26 21 - 32 mmol/L    Anion Gap 7 7 - 16 mmol/L    Glucose 462 (HH) 65 - 100 mg/dL    BUN 17 6 - 23 MG/DL    CREATININE 1.10 (H) 0.6 - 1.0 MG/DL    GFR African American >60 >60 ml/min/1.73m2    GFR Non- 57 (L) >60 ml/min/1.73m2    Calcium 8.6 8.3 - 10.4 MG/DL    Total Bilirubin 0.4 0.2 - 1.1 MG/DL    ALT 12 12 - 65 U/L    AST 7 (L) 15 - 37 U/L    Alk Phosphatase 120 50 - 136 U/L    Total Protein 6.5 6.3 - 8.2 g/dL    Albumin 2.8 (L) 3.5 - 5.0 g/dL    Globulin 3.7 (H) 2.3 - 3.5 g/dL    Albumin/Globulin Ratio 0.8 (L) 1.2 - 3.5     Lipid Panel    Collection Time: 06/07/22  1:04 PM   Result Value Ref Range    Cholesterol, Total 151 <200 MG/DL    Triglycerides 286 (H) 35 - 150 MG/DL    HDL 37 (L) 40 - 60 MG/DL    LDL Calculated 56.8 <100 MG/DL    VLDL Cholesterol Calculated 57.2 (H) 6.0 - 23.0 MG/DL    Chol/HDL Ratio 4.1       Lab Results   Component Value Date    CHOL 151 06/07/2022    HDL 37 06/07/2022    VLDL 42 10/08/2021            PLAN    Donaldo Estrada was seen today for consultation. Diagnoses and all orders for this visit:    Abnormal brain MRI      Return if symptoms worsen or fail to improve. Thank you for allowing me to participate in this patient's care. Please call or contact me if there are any questions or concerns regarding the above.       Jeovany English MD  06/21/22  1:15 PM      Proofread, but unrecognized errors may exist.

## 2022-06-22 ENCOUNTER — TELEPHONE (OUTPATIENT)
Dept: NEUROLOGY | Age: 47
End: 2022-06-22

## 2022-06-22 NOTE — TELEPHONE ENCOUNTER
----- Message from Bakari St MD sent at 6/22/2022  7:34 AM EDT -----  Notifying good mri as expected for migraine

## 2022-07-01 ENCOUNTER — TELEPHONE (OUTPATIENT)
Dept: CARDIOLOGY CLINIC | Age: 47
End: 2022-07-01

## 2022-07-01 NOTE — TELEPHONE ENCOUNTER
----- Message from Charlene De La Cruz MD sent at 7/1/2022 12:22 PM EDT -----  Please let patient know that we contacted his neurologist in no further cardiac testing was recommended. ----- Message -----  From: Alma Dupont MD  Sent: 6/23/2022   9:49 AM EDT  To: Alma Dupont MD, #    S'GPA     I agree as TIA can occur as focality metabolic abnormality from her metabolic problems. No new to do. Alma Dupont MD  Consultative Neurology, Neurodiagnostics   [facility]   [address]  [facility], [address] [place] [address] Avenue  Phone:  [phone]  Fax:   [phone]   ----- Message -----  From: Charlene De La Cruz MD  Sent: 6/21/2022   3:53 PM EDT  To: MD Dr. Huseyin Trinidad    I saw this patient today referral from PCP. It appears the MRI not definitive for infarct. We discussed additional testing with transesophageal echocardiogram loop recorder etc.  However because this was not a clear infarct, I am holding off on any testing unless you would like this done.     Thank you so much for your time    [employer]

## 2022-07-11 ENCOUNTER — PATIENT MESSAGE (OUTPATIENT)
Dept: ENDOCRINOLOGY | Age: 47
End: 2022-07-11

## 2022-07-11 DIAGNOSIS — Z96.41 INSULIN PUMP STATUS: Primary | ICD-10-CM

## 2022-07-11 DIAGNOSIS — Z96.41 INSULIN PUMP STATUS: ICD-10-CM

## 2022-07-11 NOTE — TELEPHONE ENCOUNTER
From: Jeffrey Rubin  To: Sparkle Banegas  Sent: 7/11/2022 4:29 PM EDT  Subject: Omnipod setup    Currently I am taking 80 units of Tresiba in the evening. Humalog @ 15 units 30 minutes before a meal, as well as one unit per 25 over 150 for correction.

## 2022-07-22 NOTE — PROGRESS NOTES
Begin gout medicine once a day for 1 week  Metoprolol tartrate 25 mg twice a day  Begin bumex 1 mg three times a day Friday, Saturday and Sunday    Pt and family with more questions related to care/procedure. Ortho team was messaged.

## 2022-07-26 DIAGNOSIS — E11.65 UNCONTROLLED TYPE 2 DIABETES MELLITUS WITH HYPERGLYCEMIA (HCC): Primary | ICD-10-CM

## 2022-07-26 RX ORDER — BLOOD-GLUCOSE SENSOR
EACH MISCELLANEOUS
Qty: 3 EACH | Refills: 0 | Status: SHIPPED | OUTPATIENT
Start: 2022-07-26 | End: 2022-09-16 | Stop reason: SDUPTHER

## 2022-07-26 NOTE — TELEPHONE ENCOUNTER
Patient called and stated that she is out of state in [place] and left her Dexcom G6 supplies at home. Patient's sensor will run out before she goes back home. Patient would like prescription sent to the pharmacy in [place]. Patient is advised that insurance may not cover since she just got a refill. Patient advised to reach out to insurance to see if they may or may not do an override due to the situation. Patient is on Omnipod 5th generation pump.

## 2022-07-26 NOTE — TELEPHONE ENCOUNTER
Constanza response:    Hi. I hope your vacation is going well. I'm sending in the dexcom sensors now. If they price is wild, call and check with [employer] or [email or web address] to see if you can buy one single sensor sticker for a fair price. I'm looking forward to hearing your experience with the omnipod 5 at our 8/16 follow up. Please make sure we can see your data in advance and that you are keeping the justo open to ensure it uploads.     Truman

## 2022-09-16 ENCOUNTER — OFFICE VISIT (OUTPATIENT)
Dept: ENDOCRINOLOGY | Age: 47
End: 2022-09-16
Payer: COMMERCIAL

## 2022-09-16 VITALS
BODY MASS INDEX: 27.28 KG/M2 | HEIGHT: 68 IN | WEIGHT: 180 LBS | SYSTOLIC BLOOD PRESSURE: 128 MMHG | DIASTOLIC BLOOD PRESSURE: 82 MMHG | HEART RATE: 78 BPM | OXYGEN SATURATION: 98 %

## 2022-09-16 DIAGNOSIS — Z89.421 HISTORY OF AMPUTATION OF LESSER TOE OF RIGHT FOOT (HCC): ICD-10-CM

## 2022-09-16 DIAGNOSIS — E11.65 UNCONTROLLED TYPE 2 DIABETES MELLITUS WITH HYPERGLYCEMIA (HCC): Primary | ICD-10-CM

## 2022-09-16 DIAGNOSIS — E11.21 MACROALBUMINURIC DIABETIC NEPHROPATHY (HCC): ICD-10-CM

## 2022-09-16 DIAGNOSIS — E78.2 MIXED HYPERLIPIDEMIA: ICD-10-CM

## 2022-09-16 DIAGNOSIS — Z96.41 INSULIN PUMP STATUS: ICD-10-CM

## 2022-09-16 LAB — HBA1C MFR BLD: 10.2 %

## 2022-09-16 PROCEDURE — 3046F HEMOGLOBIN A1C LEVEL >9.0%: CPT | Performed by: INTERNAL MEDICINE

## 2022-09-16 PROCEDURE — 95251 CONT GLUC MNTR ANALYSIS I&R: CPT | Performed by: INTERNAL MEDICINE

## 2022-09-16 PROCEDURE — 83036 HEMOGLOBIN GLYCOSYLATED A1C: CPT | Performed by: INTERNAL MEDICINE

## 2022-09-16 PROCEDURE — 99214 OFFICE O/P EST MOD 30 MIN: CPT | Performed by: INTERNAL MEDICINE

## 2022-09-16 RX ORDER — BLOOD-GLUCOSE TRANSMITTER
EACH MISCELLANEOUS
Qty: 1 EACH | Refills: 3 | Status: SHIPPED | OUTPATIENT
Start: 2022-09-16

## 2022-09-16 RX ORDER — BLOOD-GLUCOSE SENSOR
EACH MISCELLANEOUS
Qty: 9 EACH | Refills: 3 | Status: SHIPPED | OUTPATIENT
Start: 2022-09-16

## 2022-09-16 NOTE — PROGRESS NOTES
MISC; Use to monitor blood glucose, E11.65  Dispense: 1 each; Refill: 3  - Microalbumin / Creatinine Urine Ratio; Future  - Vitamin D 25 Hydroxy; Future  - Comprehensive Metabolic Panel; Future  - MI CONTINUOUS GLUCOSE MONITORING ANALYSIS I&R    2. Insulin pump status  Patient now on OmniPod 5 system. Unfortunately she is receiving the majority of her insulin is basal insulin with intermittent use of her pods. Patient is not entering data into the pump meaning that her blood sugar drops far too low when she does this. We will increase carb ratio from 6.5 up to 7 as below and strongly encourage patient to bolus before every single meal and snack. Omnipod 5  standard pattern- 24 hour total 34 units     Time  00:00  1.4    Carb Ratio 00:00  7  Insulin Sensitivity 25  Target  130  Target high 150  Active Insulin time 4:00  Max Bolus 25 units  Dispense: 1 each; Refill: 0  - TRESIBA FLEXTOUCH 200 UNIT/ML SOPN; IN CASE OF PUMP FAILURE, inject 40 units q 24 hours  Dispense: 3 mL; Refill: 1    3. Mixed hyperlipidemia  Last LDL at goal June 2022 on atorvastatin 40mg    Lab Results   Component Value Date    LDLCALC 56.8 06/07/2022         4. History of amputation of lesser toe of right foot ([address]  At today's visit we discussed sequela associated with uncontrolled diabetes including increased risk of stroke, heart attack, kidney failure, amputation, retinopathy, neuropathy, and nephropathy. Patient was strongly encouraged to comply with treatment regimen as well as dietary and exercise recommendations to aid in control of this chronic disease to help prevent complications associated with uncontrolled diabetes. Home foot care discussed     5. Macroalbuminuric diabetic nephropathy ([address]  Started on [place] Schneiders at last visit, patient did not have follow-up labs as planned and requested. No confirmation if she is suffering from hyperkalemia.   She was given new lab orders to complete  ASAP  - Microalbumin / Creatinine Urine Ratio; Future  - Comprehensive Metabolic Panel; Future          Valera Homans was seen today for diabetes. Diagnoses and all orders for this visit:    Uncontrolled type 2 diabetes mellitus with hyperglycemia ([address]  -     AMB POC HEMOGLOBIN A1C  -     Continuous Blood Gluc Sensor (DEXCOM G6 SENSOR) MISC; Use to monitor blood glucose, E11.65  -     Continuous Blood Gluc Transmit (DEXCOM G6 TRANSMITTER) MISC; Use to monitor blood glucose, E11.65  -     Microalbumin / Creatinine Urine Ratio; Future  -     Vitamin D 25 Hydroxy; Future  -     Comprehensive Metabolic Panel; Future  -     CA CONTINUOUS GLUCOSE MONITORING ANALYSIS I&R    Insulin pump status  -     Insulin Infusion Pump JEROME; Pump settings:   Omnipod 5  standard pattern- 24 hour total 34 units     Time  00:00  1.4    Carb Ratio 00:00  7  Insulin Sensitivity 25  Target  130  Target high 150  Active Insulin time 4:00  Max Bolus 25 units  -     TRESIBA FLEXTOUCH 200 UNIT/ML SOPN; IN CASE OF PUMP FAILURE, inject 40 units q 24 hours    Mixed hyperlipidemia    History of amputation of lesser toe of right foot (HCC)    Macroalbuminuric diabetic nephropathy (HCC)  -     Microalbumin / Creatinine Urine Ratio; Future  -     Comprehensive Metabolic Panel; Future          History of Present Illness:    9/16/2022  Patient returns clinic for follow-up of wildly uncontrolled diabetes now on OmniPod 5. Unfortunately patient has had intermittent use of this system. Review of download shows she is receiving 85% of her insulin via basal with minimal bolusing.   With multiple hiatus from use       Current Regimen:  Metformin XR 750mg twice daily, Jardiance 25mg, Januvia 100mg, Humalog via omnipod 5     5/31/2022  Pt has been working from home due to right 4th toe infection without osteomyelitis complicated by bilateral edema     On max tolerated dose of lisinopril 20mg, had hypotensive events when taking 40mg lisinopril      Out of most diabetes meds for past few months, unable to low-dose Jardiance without side effect in addition to basal insulin, twice daily metformin, and Humalog by correction scale. She has had improved glycemic control but does appear to have some significant postprandial hyperglycemia in the evenings  despite her improving hemoglobin A1c       08/05/2019   Patient returns clinic for follow-up on GLP-1, SGL T2, metformin, basal insulin and Humalog by correction. She has been using a freestyle myra 10-day CGM needs a 14-day CGM reader. Patient's CGM shows some overnight hypoglycemia on current 88 units of Tresiba and with her previous 84 units of [place]. Patient is experiencing some postprandial hyperglycemia. Patient reiterates that she typically has nausea associated  with the day after Trulicity injection then has decent glycemic control for several days and 2 to 3 days before her next injection is due she experiences hyperglycemia. 11/05/2019   Patient returns clinic for follow-up of uncontrolled type 2 diabetes on GLP-1, SGLT2, metformin, basal insulin and Humalog by correction. Unfortunately, patient has wildly elevated  blood glucose levels. She is intolerant to her Trulicity with multiple days of nausea and occasional vomiting. She complains of general malaise. She is profoundly frustrated by her poor progress. She was evaluated by neurology who sent  her to physical therapy for her neuropathy. She has a sedentary lifestyle working at school and has been unable to exercise due to her neuropathy and hip pain. She reports she eats very small amounts of food but she continues to have wildly abnormal  blood glucose level. She is reticent to use her insulin because of concern for weight gain       08/18/2020   VIRTUAL VISIT       Pt meets for virtual follow up.  Has been taking single dose prandial insulin since hospital discharge in April       humalog 3:15 carb ratio for supper often 9-30 units and using correction during the day and bedtime 11/18/2020   VIRTUAL VISIT   Isiah Carvalho is a 41 y.o. female who was seen by synchronous (real-time) audio-video technology on 11/18/2020 . The patient provided consent for this audio-video interaction. The [employer] platform was used. Patient was located at their job and provider in the office           Pt reports that when she started the ozempic challenge she started at 0.5mg dose, (taking only 1 week at 0.25 mg then uptitrating) notes \"black lines\" in vision of her left eye. Contacted  optho this week, onset late September, has not heard back). Continue to have severe GI upset with ozempic       Diet:  No specific dietary precautions       Exercise:  no regular exercise       Body weight trend: increasing steadily        Diabetes education: The patient has received formal diabetes education (during pregnancy but not  since). Home blood glucose monitoring frequency:   By review of CGM download over past 90 days  Average blood glucose 394 ± 22  Time in range 0%  High 100%, Very High 100%  Low 0%, Very Low 0%       Typical Standard Deviation   Fasting 391 23   AC lunch 394 22   AC supper 390 28   Bedtime 399 6      Blood glucose levels are uncontrolled, most significant elevations are constant         Hypoglycemia: never       Hemoglobin A1c:   3/16/2016: 12.7%. 3/19/2016: 11.2%.   7/20/2016:  10.8%. 11/30/2016: 10.7%. 5/24/2017: 11.1%.   11/12/2018: 11.9%.   02/19/2019: 6.9%   05/20/2019: 6.2%   08/05/2019: 7.6.%   11/05/2019: 9.8%   03/30/2020: 10.9%   09/11/2020: 7.2%                                           02/23/2021: 10.4%   02/23/2021: 8.1%   06/10/2021: 9.6%   10/8/2021: 11.9%  02/18/2022: 11.4%  05/31/2022: 13.1%  09/16/2022: 10.2%           Diabetic complications:                     Retinopathy : Last eye exam was  Aug 2022 and demonstrated nonproliferative diabetic retinopathy, now on avastin injections. [facility]. Albuminuria/nephropathy :                           Urine microalbumin unknown (not recently checked, per patient). 3/19/2016:  Serum creatinine 0.57.                           2019      Cr 0.62, , microalbumin/Cr ratio 49.9                           2020      Cr  0.65, , microalbumin/Cr ratio 320                          10/08/2021      Cr 0.74, GFR 98,   microalbumin/Cr ratio 5,334                      2022      Cr 0.79, GFR 91                      Neuropathy :  Numbness and burning of feet. She complains of left foot drop; Dr. Muriel Guy told her that she has peroneal nerve damage. Other pertinent labs:                   Vitamin D                           2019      10.0                           2020      16.3                               2021      35.6                                  10/08/2021      14.6 off ergocalciferol                    Lipids :                            2019  TC- 165, LDL- 95, VLDL- 27,  HDL- 43, TG- 135                               2021  TC- 78, LDL- 21, VLDL- 25,  HDL- 32, TG- 148                           10/08/2021  TC- 156, LDL- 80 VLDL- 42,  HDL- 34, TG- 253                       Thyroid :                            TSH                           2019      1.690                           2019      0.508                               2020      0.888                                  10/08/2021      1.260                       Diabetes Labs:               2019                           C-peptide: 2.1 (fasting and concurrent glucose of 271)                           KIARA-65: <5.0                           IA-2 Ab: 1.0       Pregnancy: A1 (one spontaneous  in )      Allergies & Medications:  Reviewed in chart. Review of Systems   Constitutional:  Positive for unexpected weight change.      Vital Signs:  /82   Pulse 78   Ht 5' 8\" (1.727 m)   Wt 180 lb (81.6 kg)   SpO2 98%   BMI 27.37 kg/m²       Physical Exam  Constitutional:       Appearance: Normal appearance. HENT:      Head: Normocephalic. Neck:      Thyroid: No thyroid mass or thyromegaly. Vascular: No carotid bruit. Cardiovascular:      Rate and Rhythm: Normal rate and regular rhythm. Pulmonary:      Effort: Pulmonary effort is normal.      Breath sounds: Normal breath sounds. Abdominal:      Palpations: Abdomen is soft. Musculoskeletal:      Cervical back: Neck supple. Right lower leg: Edema present. Left lower leg: Edema present. Feet:      Right foot:      Protective Sensation: 3 sites tested. 3 sites sensed. Skin integrity: Skin integrity normal.      Left foot:      Protective Sensation: 3 sites tested. 3 sites sensed. Skin integrity: Skin integrity normal.      Comments: Surgically absent right fifth toe  Lymphadenopathy:      Cervical: No cervical adenopathy. Skin:     General: Skin is warm and dry. Neurological:      General: No focal deficit present. Mental Status: She is alert. Sensory: Sensation is intact. Psychiatric:         Mood and Affect: Mood normal.         Behavior: Behavior normal.         Thought Content: Thought content normal.         Judgment: Judgment normal.           Return close telephone follow up and 3-4 month IOV, for Diabetes DM2 Follow-Up. Portions of this note were generated with the assistance of voice recogniton software. As such, some errors in transcription may be present.

## 2022-09-19 RX ORDER — INSULIN DEGLUDEC 200 U/ML
INJECTION, SOLUTION SUBCUTANEOUS
Qty: 3 ML | Refills: 1
Start: 2022-09-19

## 2022-10-07 DIAGNOSIS — E11.65 UNCONTROLLED TYPE 2 DIABETES MELLITUS WITH HYPERGLYCEMIA (HCC): ICD-10-CM

## 2022-10-07 DIAGNOSIS — E11.21 MACROALBUMINURIC DIABETIC NEPHROPATHY (HCC): ICD-10-CM

## 2022-10-07 LAB
25(OH)D3 SERPL-MCNC: 18.1 NG/ML (ref 30–100)
ALBUMIN SERPL-MCNC: 2.4 G/DL (ref 3.5–5)
ALBUMIN/GLOB SERPL: 0.7 {RATIO} (ref 1.2–3.5)
ALP SERPL-CCNC: 85 U/L (ref 50–136)
ALT SERPL-CCNC: 17 U/L (ref 12–65)
ANION GAP SERPL CALC-SCNC: 10 MMOL/L (ref 4–13)
AST SERPL-CCNC: 12 U/L (ref 15–37)
BILIRUB SERPL-MCNC: 0.3 MG/DL (ref 0.2–1.1)
BUN SERPL-MCNC: 15 MG/DL (ref 6–23)
CALCIUM SERPL-MCNC: 8.1 MG/DL (ref 8.3–10.4)
CHLORIDE SERPL-SCNC: 109 MMOL/L (ref 101–110)
CO2 SERPL-SCNC: 20 MMOL/L (ref 21–32)
CREAT SERPL-MCNC: 1.4 MG/DL (ref 0.6–1)
CREAT UR-MCNC: 73 MG/DL
GLOBULIN SER CALC-MCNC: 3.3 G/DL (ref 2.3–3.5)
GLUCOSE SERPL-MCNC: 250 MG/DL (ref 65–100)
MICROALBUMIN UR-MCNC: 649 MG/DL
MICROALBUMIN/CREAT UR-RTO: 8890 MG/G
POTASSIUM SERPL-SCNC: 3.8 MMOL/L (ref 3.5–5.1)
PROT SERPL-MCNC: 5.7 G/DL (ref 6.3–8.2)
SODIUM SERPL-SCNC: 139 MMOL/L (ref 136–145)

## 2022-10-11 NOTE — RESULT ENCOUNTER NOTE
Vit D remains low, urine protein remains high. Potassium in is range on Kallie. Recommend pt follow up with nephrology, continue to work on improving glycemic control with use of pump and other diabetes medication.  Enter ALL carbs into the pump before eating, frequently enter readings into the pump with ZERO carbs to benefit from correction

## 2022-11-06 ENCOUNTER — HOSPITAL ENCOUNTER (EMERGENCY)
Age: 47
Discharge: HOME | End: 2022-11-06
Attending: EMERGENCY MEDICINE
Payer: COMMERCIAL

## 2022-11-06 VITALS
HEIGHT: 68 IN | TEMPERATURE: 98.4 F | BODY MASS INDEX: 25.16 KG/M2 | HEART RATE: 85 BPM | DIASTOLIC BLOOD PRESSURE: 82 MMHG | WEIGHT: 166 LBS | SYSTOLIC BLOOD PRESSURE: 148 MMHG | RESPIRATION RATE: 16 BRPM | OXYGEN SATURATION: 96 %

## 2022-11-06 DIAGNOSIS — B34.9 VIRAL SYNDROME: Primary | ICD-10-CM

## 2022-11-06 LAB
FLUAV AG NPH QL IA: NEGATIVE
FLUBV AG NPH QL IA: NEGATIVE
SARS-COV-2 RDRP RESP QL NAA+PROBE: NOT DETECTED
SOURCE: NORMAL
SPECIMEN SOURCE: NORMAL

## 2022-11-06 PROCEDURE — 87635 SARS-COV-2 COVID-19 AMP PRB: CPT

## 2022-11-06 PROCEDURE — 87804 INFLUENZA ASSAY W/OPTIC: CPT

## 2022-11-06 PROCEDURE — 99283 EMERGENCY DEPT VISIT LOW MDM: CPT

## 2022-11-06 ASSESSMENT — ENCOUNTER SYMPTOMS: SORE THROAT: 1

## 2022-11-06 ASSESSMENT — PAIN DESCRIPTION - LOCATION: LOCATION: HEAD

## 2022-11-06 ASSESSMENT — PAIN - FUNCTIONAL ASSESSMENT: PAIN_FUNCTIONAL_ASSESSMENT: 0-10

## 2022-11-06 ASSESSMENT — PAIN SCALES - GENERAL: PAINLEVEL_OUTOF10: 4

## 2022-11-06 NOTE — Clinical Note
Junaid Phillips was seen and treated in our emergency department on 11/6/2022. She may return to work on 11/08/2022. If you have any questions or concerns, please don't hesitate to call.       Doreen Session, DO

## 2022-11-06 NOTE — Clinical Note
Liz Robin was seen and treated in our emergency department on 11/6/2022. She may return to work on 11/08/2022. If you have any questions or concerns, please don't hesitate to call.       Carlos Manuel Johnson, DO

## 2022-11-07 NOTE — ED NOTES
I have reviewed discharge instructions with the patient. The patient verbalized understanding. Patient left ED via Discharge Method: ambulatory to Home with  family self,). Opportunity for questions and clarification provided. Patient given 0 scripts. To continue your aftercare when you leave the hospital, you may receive an automated call from our care team to check in on how you are doing. This is a free service and part of our promise to provide the best care and service to meet your aftercare needs.  If you have questions, or wish to unsubscribe from this service please call [phone]. Thank you for Choosing our [facility] Emergency Department.        Taco Meade RN  11/06/22 [id number]

## 2022-11-07 NOTE — ED PROVIDER NOTES
Emergency Department Provider Note                   PCP: Owen Waters MD               Age: 56 y.o. Sex: female       ICD-10-CM    1. Viral syndrome  B34.9           DISPOSITION Decision To Discharge 11/06/2022 07:27:58 PM       MDM  Number of Diagnoses or Management Options  Diagnosis management comments: Viral infection, croup (bronchiolitis), mononucleosis, COVID-19    Acute sinusitis, chronic sinusitis,    OM, serous OM, otitis externa,    Pharyngitis, Strep Throat, adenitis, uvulitis, rhinitis, postnasal drainage, nasal congestion    Bronchitis, pneumonia. .. Amount and/or Complexity of Data Reviewed  Clinical lab tests: reviewed and ordered         ED Course as of 11/06/22 1929   Sun Nov 06, 2022 1910 SARS-CoV-2, Rapid: Not detected [KH]   1910 Influenza B Ag: Negative [KH]   1910 Influenza A Ag: Negative [KH]      ED Course User Index  [KH] Vicenta Beverage, DO        Orders Placed This Encounter   Procedures    Rapid influenza A/B antigens    COVID-19, Rapid        Medications - No data to display    New Prescriptions    No medications on file        Luis Srivastava is a 56 y.o. female who presents to the Emergency Department with chief complaint of    Chief Complaint   Patient presents with    Chills    Headache    Cough      43-year-old female presenting to the emerged from day complaining of a sore throat some intermittent body aches and chills but note temperature. 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 The patient states they have had a lot of illness coming through the school. She was worried that she may have flu or COVID and wanted to get checked before going back to work this week. All other systems reviewed and are negative unless otherwise stated in the history of present illness section. Review of Systems   Constitutional:  Positive for activity change and chills. HENT:  Positive for sore throat. Musculoskeletal:  Positive for myalgias. All other systems reviewed and are negative.     Past Medical History:   Diagnosis Date    Chronic kidney disease     Diabetes ([facility]     Diabetic peripheral neuropathy associated with type 2 diabetes mellitus ([facility]     Eczema     Hypertension     Nonproliferative diabetic retinopathy ([facility]     Psoriasis     Type 2 diabetes mellitus, uncontrolled         Past Surgical History:   Procedure Laterality Date    CATARACT REMOVAL Right 2021    CATARACT REMOVAL Left 2021     SECTION   and     EYE SURGERY  2021 May and     ORTHOPEDIC SURGERY  2020    Right 5th Toe Amputated    OTHER SURGICAL HISTORY      cyst removed from neck    UPPER GASTROINTESTINAL ENDOSCOPY  2021    clear        Family History   Problem Relation Age of Onset    Cancer Maternal Grandfather         lung (smoker)    Diabetes Maternal Grandfather     Cancer Paternal Grandfather         lung (smoker)    Breast Cancer Maternal Grandmother     Crohn's Disease Paternal Grandmother     Diabetes Father     Heart Attack Father     Heart Disease Father     Allergy (Severe) Brother     Asthma Brother         Social History     Socioeconomic History    Marital status:      Spouse name: None    Number of children: None    Years of education: None    Highest education level: None   Tobacco Use    Smoking status: Former     Packs/day: 0.50     Types: Cigarettes     Quit date: 2007     Years since quittin.9    Smokeless tobacco: Never   Vaping Use    Vaping Use: Never used   Substance and Sexual Activity    Alcohol use: No     Alcohol/week: 0.0 standard drinks    Drug use: No     Social Determinants of Health     Physical Activity: Inactive    Days of Exercise per Week: 0 days    Minutes of Exercise per Session: 0 min   Intimate Partner Violence: Not At Risk    Fear of Current or Ex-Partner: No    Emotionally Abused: No    Physically Abused: No    Sexually Abused: No        Allergies: Patient has no known allergies. Previous Medications    ATORVASTATIN (LIPITOR) 40 MG TABLET    Take 40 mg by mouth daily    CONTINUOUS BLOOD GLUC SENSOR (DEXCOM G6 SENSOR) MISC    Use to monitor blood glucose, E11.65    CONTINUOUS BLOOD GLUC TRANSMIT (DEXCOM G6 TRANSMITTER) MISC    Use to monitor blood glucose, E11.65    ERGOCALCIFEROL (ERGOCALCIFEROL) 1.25 MG (64294 UT) CAPSULE    Take 1 capsule by mouth Twice a Week    HUMALOG 100 UNIT/ML INJECTION VIAL    Use with insulin pump, max daily dose 60 units    INSULIN DISPOSABLE PUMP (OMNIPOD 5 G6 INTRO, GEN 5,) KIT    Use to deliver insulin, E11.65    INSULIN DISPOSABLE PUMP (OMNIPOD 5 G6 POD, GEN 5,) MISC    Use to deliver insulin, E11.65    INSULIN INFUSION PUMP JEROME    Pump settings:   Omnipod 5  standard pattern- 24 hour total 34 units     Time  00:00  1.4    Carb Ratio 00:00  7  Insulin Sensitivity 25  Target  130  Target high 150  Active Insulin time 4:00  Max Bolus 25 units    JANUVIA 100 MG TABLET    Take 1 tablet by mouth daily    JARDIANCE 25 MG TABLET    Take 1 tablet by mouth daily    KERENDIA 10 MG TABS    Take 10 mg by mouth daily    METFORMIN (GLUCOPHAGE-XR) 750 MG EXTENDED RELEASE TABLET    Take 1 tablet by mouth daily    TRESIBA FLEXTOUCH 200 UNIT/ML SOPN    IN CASE OF PUMP FAILURE, inject 40 units q 24 hours        Vitals signs and nursing note reviewed. Patient Vitals for the past 4 hrs:   Temp Pulse Resp BP SpO2   11/06/22 1657 98.4 °F (36.9 °C) 87 16 (!) 159/88 96 %          Physical Exam     GENERAL:The patient has Body mass index is 25.24 kg/m². Well-hydrated. No acute distress. VITAL SIGNS: Heart rate, blood pressure, respiratory rate reviewed as recorded in  nurse's notes  EYES: Pupils reactive. Extraocular motion intact. No conjunctival redness or drainage. EARS: No erythema in the external auditory canals appreciated.  The patient does not have   fluid behind the tympanic membranes, no bulging or erythema noted.  NOSE: No erythema or drainage appreciated. No epistaxis present  MOUTH: Uvula is midline. There is no tonsillar enlargement or exudates appreciated. The patient does have mild cobblestoning noted in the posterior pharynx. The floor the  mouth is soft and there is no lesions or lacerations normal cavity. NECK: No tenderness to palpation or enlargement of the submandibular lymph  nodes bilaterally. Trachea midline. LUNGS: No accessory muscle use  CARDIOVASCULAR: Regular rate and rhythm  EXTREMITIES: Pt moving all 4 extremities with out limitations. Normal muscle tone. NEUROLOGIC: Cranial nerve exam reveals face is symmetrical, tongue is midline  speech is clear. No focal deficits noted  SKIN: No rash or petechiae. Good skin turgor palpated. PSYCHIATRIC: Alert and oriented. Appropriate behavior and judgment. Procedures      Results for orders placed or performed during the hospital encounter of 11/06/22   Rapid influenza A/B antigens    Specimen: Nasal Washing   Result Value Ref Range    Influenza A Ag Negative NEG      Influenza B Ag Negative NEG      Source Nasopharyngeal     COVID-19, Rapid    Specimen: Nasopharyngeal   Result Value Ref Range    Source Nasopharyngeal      SARS-CoV-2, Rapid Not detected NOTD          No orders to display                         Voice dictation software was used during the making of this note. This software is not perfect and grammatical and other typographical errors may be present. This note has not been completely proofread for errors.        Bret Walsh,   11/06/22 1929

## 2022-11-07 NOTE — ED NOTES
Pt presents to the ED for c/o chills and sweats for the last several nights     Zena Pereyra RN  11/06/22 2032

## 2022-12-01 ENCOUNTER — TELEPHONE (OUTPATIENT)
Dept: ENDOCRINOLOGY | Age: 47
End: 2022-12-01

## 2022-12-01 NOTE — TELEPHONE ENCOUNTER
Patient left a voicemail for a refill on her dexcom sensor and transmitter. I called patient back and let her know she still has 3 refills left at her pharmacy. Patient expressed understanding.

## 2023-02-21 NOTE — PROGRESS NOTES
TRANSFER - IN REPORT:    Verbal report received from [facility], [address] [place] [address] (name) on Rosales Lambert  being received from PACU (unit) for routine post - op      Report consisted of patients Situation, Background, Assessment and   Recommendations(SBAR). Information from the following report(s) SBAR, Kardex, Procedure Summary, Intake/Output, MAR and Recent Results was reviewed with the receiving nurse. Opportunity for questions and clarification was provided. Assessment completed upon patients arrival to unit and care assumed. normal (ped)...

## 2023-05-19 ENCOUNTER — PATIENT MESSAGE (OUTPATIENT)
Dept: ENDOCRINOLOGY | Age: 48
End: 2023-05-19

## 2023-05-22 NOTE — TELEPHONE ENCOUNTER
Constanza response: That is a drastic change from your previous labs with me    I agree that you need to stop the metformin with a low GFR    I think you can remain on the jardiance for now    I see you also have a referral to nephrology     Yon Ramos is the newest med from this office. Please stop taking it for right now and discuss it with nephrology on 5/24    Let's plan to repeat your labs off kerendia, off metformin and on jardiance in a few weeks, just before you 6/9 appointment. There are fasting labs already ordered that include urine.     ~Rhonda

## 2023-05-22 NOTE — TELEPHONE ENCOUNTER
From: Melani Lee  To: Lety García  Sent: 5/19/2023 4:10 PM EDT  Subject: Medication changes    My GP had me do quite a bit of lab work last week. My eGFR is 17. Because of this, he advised that I stop taking Jardiance, Metformin and Kerendia. Do you have any recommendations? He thinks I need to be seen sooner than later.     Mary Grace Peterson

## 2023-06-02 DIAGNOSIS — E11.65 UNCONTROLLED TYPE 2 DIABETES MELLITUS WITH HYPERGLYCEMIA (HCC): ICD-10-CM

## 2023-06-02 LAB
25(OH)D3 SERPL-MCNC: 41.7 NG/ML (ref 30–100)
ALBUMIN SERPL-MCNC: 2.3 G/DL (ref 3.5–5)
ALBUMIN/GLOB SERPL: 0.7 (ref 0.4–1.6)
ALP SERPL-CCNC: 58 U/L (ref 50–136)
ALT SERPL-CCNC: 15 U/L (ref 12–65)
ANION GAP SERPL CALC-SCNC: 6 MMOL/L (ref 2–11)
AST SERPL-CCNC: 6 U/L (ref 15–37)
BASOPHILS # BLD: 0.1 K/UL (ref 0–0.2)
BASOPHILS NFR BLD: 1 % (ref 0–2)
BILIRUB SERPL-MCNC: 0.3 MG/DL (ref 0.2–1.1)
BUN SERPL-MCNC: 31 MG/DL (ref 6–23)
CALCIUM SERPL-MCNC: 7.6 MG/DL (ref 8.3–10.4)
CHLORIDE SERPL-SCNC: 122 MMOL/L (ref 101–110)
CHOLEST SERPL-MCNC: 79 MG/DL
CO2 SERPL-SCNC: 15 MMOL/L (ref 21–32)
CREAT SERPL-MCNC: 3.5 MG/DL (ref 0.6–1)
CREAT UR-MCNC: 135 MG/DL
DIFFERENTIAL METHOD BLD: ABNORMAL
EOSINOPHIL # BLD: 0.4 K/UL (ref 0–0.8)
EOSINOPHIL NFR BLD: 10 % (ref 0.5–7.8)
ERYTHROCYTE [DISTWIDTH] IN BLOOD BY AUTOMATED COUNT: 17.8 % (ref 11.9–14.6)
GLOBULIN SER CALC-MCNC: 3.1 G/DL (ref 2.8–4.5)
GLUCOSE SERPL-MCNC: 132 MG/DL (ref 65–100)
HCT VFR BLD AUTO: 27.9 % (ref 35.8–46.3)
HDLC SERPL-MCNC: 34 MG/DL (ref 40–60)
HDLC SERPL: 2.3
HGB BLD-MCNC: 8.4 G/DL (ref 11.7–15.4)
IMM GRANULOCYTES # BLD AUTO: 0 K/UL (ref 0–0.5)
IMM GRANULOCYTES NFR BLD AUTO: 1 % (ref 0–5)
LDLC SERPL CALC-MCNC: 28.6 MG/DL
LYMPHOCYTES # BLD: 1.3 K/UL (ref 0.5–4.6)
LYMPHOCYTES NFR BLD: 30 % (ref 13–44)
MCH RBC QN AUTO: 29.3 PG (ref 26.1–32.9)
MCHC RBC AUTO-ENTMCNC: 30.1 G/DL (ref 31.4–35)
MCV RBC AUTO: 97.2 FL (ref 82–102)
MICROALBUMIN UR-MCNC: 1100 MG/DL
MICROALBUMIN/CREAT UR-RTO: 8148 MG/G (ref 0–30)
MONOCYTES # BLD: 0.3 K/UL (ref 0.1–1.3)
MONOCYTES NFR BLD: 7 % (ref 4–12)
NEUTS SEG # BLD: 2.2 K/UL (ref 1.7–8.2)
NEUTS SEG NFR BLD: 51 % (ref 43–78)
NRBC # BLD: 0.1 K/UL (ref 0–0.2)
PLATELET # BLD AUTO: 275 K/UL (ref 150–450)
PMV BLD AUTO: 8.8 FL (ref 9.4–12.3)
POTASSIUM SERPL-SCNC: 3.9 MMOL/L (ref 3.5–5.1)
PROT SERPL-MCNC: 5.4 G/DL (ref 6.3–8.2)
RBC # BLD AUTO: 2.87 M/UL (ref 4.05–5.2)
SODIUM SERPL-SCNC: 143 MMOL/L (ref 133–143)
TRIGL SERPL-MCNC: 82 MG/DL (ref 35–150)
TSH W FREE THYROID IF ABNORMAL: 1.34 UIU/ML (ref 0.36–3.74)
VLDLC SERPL CALC-MCNC: 16.4 MG/DL (ref 6–23)
WBC # BLD AUTO: 4.3 K/UL (ref 4.3–11.1)

## 2023-06-03 LAB
EST. AVERAGE GLUCOSE BLD GHB EST-MCNC: 105 MG/DL
HBA1C MFR BLD: 5.3 % (ref 4.8–5.6)

## 2023-06-09 ENCOUNTER — OFFICE VISIT (OUTPATIENT)
Dept: ENDOCRINOLOGY | Age: 48
End: 2023-06-09

## 2023-06-09 VITALS
OXYGEN SATURATION: 98 % | BODY MASS INDEX: 28.59 KG/M2 | WEIGHT: 188 LBS | SYSTOLIC BLOOD PRESSURE: 124 MMHG | HEART RATE: 80 BPM | DIASTOLIC BLOOD PRESSURE: 82 MMHG

## 2023-06-09 DIAGNOSIS — E55.9 VITAMIN D DEFICIENCY: ICD-10-CM

## 2023-06-09 DIAGNOSIS — N18.4 CHRONIC RENAL DISEASE, STAGE IV (HCC): ICD-10-CM

## 2023-06-09 DIAGNOSIS — E78.2 MIXED HYPERLIPIDEMIA: ICD-10-CM

## 2023-06-09 DIAGNOSIS — E11.65 UNCONTROLLED TYPE 2 DIABETES MELLITUS WITH HYPERGLYCEMIA (HCC): Primary | ICD-10-CM

## 2023-06-09 DIAGNOSIS — E11.21 MACROALBUMINURIC DIABETIC NEPHROPATHY (HCC): ICD-10-CM

## 2023-06-09 DIAGNOSIS — Z96.41 INSULIN PUMP STATUS: ICD-10-CM

## 2023-06-09 DIAGNOSIS — Z89.421 HISTORY OF AMPUTATION OF LESSER TOE OF RIGHT FOOT (HCC): ICD-10-CM

## 2023-06-09 RX ORDER — INSULIN PMP CART,AUT,G6/7,CNTR
EACH SUBCUTANEOUS
Qty: 30 EACH | Refills: 3 | Status: SHIPPED | OUTPATIENT
Start: 2023-06-09

## 2023-06-09 RX ORDER — GLUCAGON INJECTION, SOLUTION 1 MG/.2ML
1 INJECTION, SOLUTION SUBCUTANEOUS PRN
Qty: 2 EACH | Refills: 1 | Status: SHIPPED | OUTPATIENT
Start: 2023-06-09

## 2023-06-09 ASSESSMENT — ENCOUNTER SYMPTOMS
VOMITING: 1
DIARRHEA: 1

## 2023-06-09 NOTE — PROGRESS NOTES
compression sleeve   Lymphadenopathy:      Cervical: No cervical adenopathy. Skin:     General: Skin is warm and dry. Neurological:      General: No focal deficit present. Mental Status: She is alert. Sensory: Sensation is intact. Psychiatric:         Mood and Affect: Mood normal.         Behavior: Behavior normal.         Thought Content: Thought content normal.         Judgment: Judgment normal.           Return in about 3 months (around 9/9/2023) for Diabetes DM2 Follow-Up. Portions of this note were generated with the assistance of voice recogniton software. As such, some errors in transcription may be present.

## 2023-09-11 DIAGNOSIS — Z96.41 INSULIN PUMP STATUS: ICD-10-CM

## 2023-09-13 RX ORDER — INSULIN LISPRO 100 [IU]/ML
INJECTION, SOLUTION INTRAVENOUS; SUBCUTANEOUS
Qty: 60 ML | Refills: 3 | OUTPATIENT
Start: 2023-09-13

## 2023-09-23 DIAGNOSIS — E11.65 UNCONTROLLED TYPE 2 DIABETES MELLITUS WITH HYPERGLYCEMIA (HCC): ICD-10-CM

## 2023-09-25 RX ORDER — INSULIN PMP CART,AUT,G6/7,CNTR
EACH SUBCUTANEOUS
Qty: 30 EACH | Refills: 3 | Status: SHIPPED | OUTPATIENT
Start: 2023-09-25

## 2023-09-25 NOTE — TELEPHONE ENCOUNTER
I spoke with [facility] pharmacy and they said no PA was needed and they don't know why it was never filled. Pt states she would still like to get this rx sent to [facility].

## 2023-09-25 NOTE — TELEPHONE ENCOUNTER
I called patient to confirm that she wants her Omnipod supples sent to [facility] in [facility] and she said yes. She states she has not gotten her supplies from [facility] in [place] in yet. I suspect a PA is needed. Pharmacy is closed for lunch at the moment. I will have to call them after 2pm to see why she hasn't gotten it yet.

## 2024-01-02 ENCOUNTER — OFFICE VISIT (OUTPATIENT)
Dept: ENDOCRINOLOGY | Age: 49
End: 2024-01-02
Payer: COMMERCIAL

## 2024-01-02 VITALS — SYSTOLIC BLOOD PRESSURE: 140 MMHG | HEART RATE: 95 BPM | DIASTOLIC BLOOD PRESSURE: 95 MMHG | OXYGEN SATURATION: 100 %

## 2024-01-02 DIAGNOSIS — N18.4 CHRONIC RENAL DISEASE, STAGE IV (HCC): ICD-10-CM

## 2024-01-02 DIAGNOSIS — Z96.41 INSULIN PUMP STATUS: ICD-10-CM

## 2024-01-02 DIAGNOSIS — E11.65 UNCONTROLLED TYPE 2 DIABETES MELLITUS WITH HYPERGLYCEMIA (HCC): Primary | ICD-10-CM

## 2024-01-02 PROCEDURE — 95251 CONT GLUC MNTR ANALYSIS I&R: CPT | Performed by: PHYSICIAN ASSISTANT

## 2024-01-02 PROCEDURE — 99214 OFFICE O/P EST MOD 30 MIN: CPT | Performed by: PHYSICIAN ASSISTANT

## 2024-01-02 PROCEDURE — 83036 HEMOGLOBIN GLYCOSYLATED A1C: CPT | Performed by: PHYSICIAN ASSISTANT

## 2024-01-02 RX ORDER — AMLODIPINE BESYLATE 5 MG/1
5 TABLET ORAL DAILY
COMMUNITY
Start: 2023-11-22

## 2024-01-02 RX ORDER — TORSEMIDE 100 MG/1
100 TABLET ORAL DAILY
COMMUNITY
Start: 2023-11-21

## 2024-01-02 NOTE — PROGRESS NOTES
[facility] ENDOCRINOLOGY   AND   THYROID NODULE CLINIC    Rhonda Bauer PA-C  [facility] Endocrinology and Thyroid Nodule Clinic  [address], [address]  [place] [address]  Phone [phone]  Facsimile [phone]          Beth Golden is a 48 y.o. female seen 1/2/2024 for follow up evaluation of type 2 diabetes with advanced CKD on insulin pump        Assessment and Plan:  Interpretation of 72 hour glucose monitor:  At least 72 hours of data were reviewed.  The patient utilizes a dexcom G6 continuous glucose monitoring system.  The average glucose during the reviewed timeframe was 195 with a standard deviation of 73.  There is a pattern of constant postprandial hyperglycemia after meals without bolusing.    1. Uncontrolled type 2 diabetes mellitus with hyperglycemia (HCC)  Glycemic control is poor.  Patient is not bolusing for meals or snacks.  Receiving nearly 90% of her insulin by basal.    She is upset and feeling poorly today but hoping to transfer to emergency department for further evaluation and treatment.  Her  accompanies her today for the first time and was shown how to bolus.  Bolus for all meals and snacks.  Multiple ways to calculate carbohydrates discussed.  Patient declines referral to diabetes education    I am concerned about patient's underlying electrolyte and hydration status.  She is not open to evaluation in the emergency room.  She states that nephrology is well aware of her condition   - AMB POC HEMOGLOBIN A1C  - [place] CONTINUOUS GLUCOSE MONITORING ANALYSIS I&R  -  DIABETES FOOT EXAM    2. Insulin pump status  Decrease manual basal from 2.1 to 2.0  - Insulin Infusion Pump JEROME; Pump settings:   Omnipod 5  standard pattern- 24 hour total 34 units     Time  00:00  2.0    Carb Ratio 00:00  15  Insulin Sensitivity 25  Target  130  Target high 150  Active Insulin time 4:00  Max Bolus 25 units  Dispense: 1 each; Refill: 0    3. Chronic renal disease, stage IV

## 2024-01-04 LAB — HBA1C MFR BLD: 5.9 %

## 2024-01-10 ENCOUNTER — TELEPHONE (OUTPATIENT)
Dept: ENDOCRINOLOGY | Age: 49
End: 2024-01-10

## 2024-01-10 NOTE — TELEPHONE ENCOUNTER
Dexcom G6  transmitter      Prior authorization approved Case ID: [id number]      Payer: [employer] Patient's Payer    [phone]   CaseId:[id number];Status:Approved;Review Type:Prior Auth;Coverage Start Date:12/11/2023;Coverage End Date:01/09/2025;   Approval Details    Authorized from December 11, 2023 to January 9, 2025      Electronic appeal: Not supported   View History

## 2024-01-11 NOTE — TELEPHONE ENCOUNTER
Dexcom G6 sensor    Prior authorization approved Case ID: [id number]      Payer: [employer] Patient's Payer    [phone]   CaseId:[id number];Status:Cancelled;Explanation:Approval on File. [id number];   Electronic appeal: Not supported   View History

## 2024-02-27 ENCOUNTER — TELEPHONE (OUTPATIENT)
Dept: ENDOCRINOLOGY | Age: 49
End: 2024-02-27

## 2024-02-27 RX ORDER — INSULIN LISPRO 100 [IU]/ML
INJECTION, SOLUTION INTRAVENOUS; SUBCUTANEOUS
Qty: 70 ML | Refills: 3 | Status: SHIPPED | OUTPATIENT
Start: 2024-02-27

## 2024-02-27 NOTE — TELEPHONE ENCOUNTER
Spoke to the Pt, she increased her units and now uses 170 units every 3 days due to dialysis that is why she is running short. What do we need to do or what does she need to do?

## 2024-02-27 NOTE — TELEPHONE ENCOUNTER
Constanza response:    I recommend that you regularly bolus for correction     Hit the purple bolus button  Hit \"use sensor\"  Leave carbs at zero  And accept the insulin correction it offeres you    You are only getting 12% of your insulin by engaging with your pump. This plus bolusing BEFORE every meal and snack will help control your blood sugar    Truman

## 2024-02-27 NOTE — TELEPHONE ENCOUNTER
myChart response:    I sent Humalog vials with max daily dose of 75 units (7 vials in a 90 day supply) to [facility] in [place]    Please let me know if you have trouble getting this filled    ~Rhonda

## 2024-02-27 NOTE — TELEPHONE ENCOUNTER
Pt left message stating that she is about out of Humalog (on last pen), insurance states she can't refill more until 3/17/24. She is getting a kidney transplant on 3/5/24 and needs enough to get her until then. What does she need to do.     Left message for the Pt to call back to see how much insulin is she giving and is she still on the pump.

## 2024-04-02 ENCOUNTER — OFFICE VISIT (OUTPATIENT)
Dept: ENDOCRINOLOGY | Age: 49
End: 2024-04-02
Payer: COMMERCIAL

## 2024-04-02 VITALS — SYSTOLIC BLOOD PRESSURE: 130 MMHG | HEART RATE: 81 BPM | OXYGEN SATURATION: 85 % | DIASTOLIC BLOOD PRESSURE: 80 MMHG

## 2024-04-02 DIAGNOSIS — Z96.41 INSULIN PUMP STATUS: ICD-10-CM

## 2024-04-02 DIAGNOSIS — Z94.0 RENAL TRANSPLANT RECIPIENT: ICD-10-CM

## 2024-04-02 DIAGNOSIS — E11.65 UNCONTROLLED TYPE 2 DIABETES MELLITUS WITH HYPERGLYCEMIA (HCC): Primary | ICD-10-CM

## 2024-04-02 DIAGNOSIS — E11.21 MACROALBUMINURIC DIABETIC NEPHROPATHY (HCC): ICD-10-CM

## 2024-04-02 PROCEDURE — 95251 CONT GLUC MNTR ANALYSIS I&R: CPT | Performed by: PHYSICIAN ASSISTANT

## 2024-04-02 PROCEDURE — 99214 OFFICE O/P EST MOD 30 MIN: CPT | Performed by: PHYSICIAN ASSISTANT

## 2024-04-02 RX ORDER — VALGANCICLOVIR 450 MG/1
450 TABLET, FILM COATED ORAL 2 TIMES DAILY
COMMUNITY

## 2024-04-02 RX ORDER — PREDNISONE 5 MG/1
5 TABLET ORAL DAILY
COMMUNITY

## 2024-04-02 RX ORDER — PANTOPRAZOLE SODIUM 40 MG/1
40 TABLET, DELAYED RELEASE ORAL DAILY
COMMUNITY

## 2024-04-02 RX ORDER — MYCOPHENOLATE MOFETIL 250 MG/1
250 CAPSULE ORAL 2 TIMES DAILY
COMMUNITY

## 2024-04-02 RX ORDER — SULFAMETHOXAZOLE AND TRIMETHOPRIM 400; 80 MG/1; MG/1
1 TABLET ORAL
COMMUNITY

## 2024-04-02 RX ORDER — TACROLIMUS 1 MG/1
CAPSULE ORAL
COMMUNITY

## 2024-04-02 RX ORDER — INSULIN LISPRO 100 [IU]/ML
INJECTION, SOLUTION INTRAVENOUS; SUBCUTANEOUS
Qty: 70 ML | Refills: 3 | Status: SHIPPED | OUTPATIENT
Start: 2024-04-02

## 2024-04-02 NOTE — PROGRESS NOTES
[facility] ENDOCRINOLOGY   AND   THYROID NODULE CLINIC    Rhonda Bauer PA-C  [facility] Endocrinology and Thyroid Nodule Clinic  [address], [address]  [place] [address]  Phone [phone]  Facsimile [phone]          Beth Golden is a 48 y.o. female seen 4/2/2024 for follow up evaluation of type 2 diabetes on insulin pump        Assessment and Plan:  Interpretation of 72 hour glucose monitor:  At least 72 hours of data were reviewed.  The patient utilizes a dexcom G6 continuous glucose monitoring system.  The average glucose during the reviewed timeframe was 231 with a standard deviation of 63.  There is a pattern of constant postprandial hyperglycemia after meals without bolusing.    1. Uncontrolled type 2 diabetes mellitus with hyperglycemia (HCC)   Glycemic control is poor. Pump habits are better, pt admits to eating smaller meals/portions.     She is now s/p renal transplant on prednisone     Risk of hypoglycemia outweighs benefit of tight glycemic control. Declines need for glucagon refill.      - HUMALOG 100 UNIT/ML SOLN injection vial; Use with insulin pump, max daily dose 75 units  Dispense: 70 mL; Refill: 3  - [place] CONTINUOUS GLUCOSE MONITORING ANALYSIS I&R  -  DIABETES FOOT EXAM    2. Insulin pump status  Basal settings were changed by hospital who lowered am carb ratio to 12, she has marked hyperglycemia after meals at all times, lower ALL carb ratios to 11. Bolus for every meal and snack and for correction  Increase max basal from 2.1 to 3.25 u/h  Decrease active insulin time from 4 hours to 3.5 hours     - Insulin Infusion Pump JEROME; Pump settings:   Omnipod 5  standard pattern- 24 hour total 34 units  Average basal 35.5, max basal 3.25  Time  00:00  1.2, 11am 2.0    Carb Ratio 00:00  11  Insulin Sensitivity 25  Target  130  Target high 150  Active Insulin time 3:30  Max Bolus 25 units  Dispense: 1 each; Refill: 0    3. Macroalbuminuric diabetic nephropathy (HCC)  Now s/p

## 2024-05-01 ENCOUNTER — PATIENT MESSAGE (OUTPATIENT)
Dept: ENDOCRINOLOGY | Age: 49
End: 2024-05-01

## 2024-05-14 NOTE — TELEPHONE ENCOUNTER
Omnipod Pods approved per Hayde at insurance. Insurance will cover 10 pods every 30 days.      Approved 5/14/2024-5/14/2025  [id number]      Patient notified via [employer] message.

## 2024-05-15 ENCOUNTER — TELEPHONE (OUTPATIENT)
Dept: ENDOCRINOLOGY | Age: 49
End: 2024-05-15

## 2024-05-15 DIAGNOSIS — E11.65 UNCONTROLLED TYPE 2 DIABETES MELLITUS WITH HYPERGLYCEMIA (HCC): ICD-10-CM

## 2024-05-15 RX ORDER — INSULIN PMP CART,AUT,G6/7,CNTR
EACH SUBCUTANEOUS
Qty: 10 EACH | Refills: 5 | Status: SHIPPED | OUTPATIENT
Start: 2024-05-15

## 2024-05-21 ENCOUNTER — TELEPHONE (OUTPATIENT)
Dept: ENDOCRINOLOGY | Age: 49
End: 2024-05-21

## 2024-05-21 NOTE — TELEPHONE ENCOUNTER
Received notes from transplant team about this patient    Has markedly elevated glucose    Hannah-  Please see if she can do a virtual visit in the next 10 days for pump adjustment    Kelly- please review bolusing with patient. She is only receiving 11% of her insulin by bolusing, this is typically 40-60% of insulin use. Please remind her to bolus for every meal and snack and to bolus to obtain correction, as well

## 2024-05-23 ENCOUNTER — TELEMEDICINE (OUTPATIENT)
Dept: ENDOCRINOLOGY | Age: 49
End: 2024-05-23

## 2024-05-23 DIAGNOSIS — Z96.41 INSULIN PUMP STATUS: ICD-10-CM

## 2024-05-23 DIAGNOSIS — E11.21 MACROALBUMINURIC DIABETIC NEPHROPATHY (HCC): ICD-10-CM

## 2024-05-23 DIAGNOSIS — Z94.0 RENAL TRANSPLANT RECIPIENT: ICD-10-CM

## 2024-05-23 DIAGNOSIS — E11.65 UNCONTROLLED TYPE 2 DIABETES MELLITUS WITH HYPERGLYCEMIA (HCC): Primary | ICD-10-CM

## 2024-05-23 NOTE — PROGRESS NOTES
Cr 0.74, GFR 98,   microalbumin/Cr ratio 5,334                      02/18/2022      Cr 0.79, GFR 91                      10/07/2022      Cr 1.40, GFR 47, microalbumin/Cr ratio 8890                       05/11/2023      Cr 3.25, GFR 17                      06/02/2023      Cr 3.50, GFR 16, microalbumin/Cr ratio 8748     11/20/2023 Cr 4.47, GFR 12      S/p renal transplant      04/01/2024 Cr 0.94, GFR 75     Lipids:     Current therapy atorvastatin - 40 MG with unknown compliance      01/04/2019  TC- 165, LDL- 95, VLDL- 27,  HDL- 43, TG- 135                               03/13/2021  TC- 78, LDL- 21, VLDL- 25,  HDL- 32, TG- 148                           10/08/2021  TC- 156, LDL- 80 VLDL- 42,  HDL- 34, TG- 253    06/02/2023  TC- 79, LDL- 28.6, VLDL- 16.4,  HDL- 34, TG- 82        Lab Results   Component Value Date    CHOL 79 06/02/2023    CHOL 151 06/07/2022    CHOL 156 10/08/2021     No components found for: \"LDLCHOLESTEROL\", \"LDLCALC\"     Lab Results   Component Value Date    VLDL 16.4 06/02/2023    VLDL 57.2 (H) 06/07/2022    VLDL 42 (H) 10/08/2021      Lab Results   Component Value Date    HDL 34 (L) 06/02/2023    HDL 37 (L) 06/07/2022    HDL 34 (L) 10/08/2021      Lab Results   Component Value Date    HDL 34 (L) 06/02/2023    HDL 37 (L) 06/07/2022    HDL 34 (L) 10/08/2021      Lab Results   Component Value Date    TRIG 82 06/02/2023    TRIG 286 (H) 06/07/2022    TRIG 253 (H) 10/08/2021     Lab Results   Component Value Date    CHOLHDLRATIO 2.3 06/02/2023    CHOLHDLRATIO 4.1 06/07/2022         Hemoglobin A1c:  3/16/2016: 12.7%.   3/19/2016: 11.2%.   7/20/2016:  10.8%.   11/30/2016: 10.7%.   5/24/2017: 11.1%.   11/12/2018: 11.9%.   02/19/2019: 6.9%   05/20/2019: 6.2%   08/05/2019: 7.6.%   11/05/2019: 9.8%   03/30/2020: 10.9%   09/11/2020: 7.2%                                           02/23/2021: 10.4%   02/23/2021: 8.1%   06/10/2021: 9.6%   10/8/2021: 11.9%  02/18/2022: 11.4%  05/31/2022: 13.1%  09/16/2022:

## 2024-06-03 ENCOUNTER — PATIENT MESSAGE (OUTPATIENT)
Dept: ENDOCRINOLOGY | Age: 49
End: 2024-06-03

## 2024-06-03 DIAGNOSIS — E11.65 UNCONTROLLED TYPE 2 DIABETES MELLITUS WITH HYPERGLYCEMIA (HCC): ICD-10-CM

## 2024-06-03 DIAGNOSIS — Z96.41 INSULIN PUMP STATUS: ICD-10-CM

## 2024-06-03 RX ORDER — INSULIN LISPRO 100 [IU]/ML
INJECTION, SOLUTION INTRAVENOUS; SUBCUTANEOUS
Qty: 70 ML | Refills: 3 | Status: SHIPPED | OUTPATIENT
Start: 2024-06-03

## 2024-06-03 NOTE — TELEPHONE ENCOUNTER
From: Beth Golden  To: Rhonda Bauer  Sent: 6/3/2024 9:25 AM EDT  Subject: Rx for Humalog    Good morning.  Could you please send a new Rx for Humalog to [facility] in [place]? Since my insurance changed April 1st, all of my refills are being denied. Thank you so much.  Malka Golden  .

## 2024-06-03 NOTE — TELEPHONE ENCOUNTER
Constanza response:    Humalog vials sent. Let me know if any issues    Happy birthday!    ~Rhonda

## 2024-06-05 RX ORDER — INSULIN DEGLUDEC 200 U/ML
INJECTION, SOLUTION SUBCUTANEOUS
Qty: 18 ML | Refills: 1 | Status: SHIPPED | OUTPATIENT
Start: 2024-06-05

## 2024-06-05 RX ORDER — LANCETS
EACH MISCELLANEOUS
Qty: 102 EACH | Refills: 3 | Status: SHIPPED | OUTPATIENT
Start: 2024-06-05

## 2024-06-05 RX ORDER — LANCING DEVICE/LANCETS
KIT MISCELLANEOUS
Qty: 1 KIT | Refills: 1 | Status: SHIPPED | OUTPATIENT
Start: 2024-06-05

## 2024-06-05 RX ORDER — INSULIN ASPART 100 [IU]/ML
INJECTION, SOLUTION INTRAVENOUS; SUBCUTANEOUS
Qty: 45 ML | Refills: 1 | Status: SHIPPED | OUTPATIENT
Start: 2024-06-05

## 2024-06-05 RX ORDER — PEN NEEDLE, DIABETIC 32 GX 1/4"
NEEDLE, DISPOSABLE MISCELLANEOUS
Qty: 150 EACH | Refills: 5 | Status: SHIPPED | OUTPATIENT
Start: 2024-06-05

## 2024-06-05 RX ORDER — BLOOD-GLUCOSE METER
EACH MISCELLANEOUS
Qty: 1 KIT | Refills: 1 | Status: SHIPPED | OUTPATIENT
Start: 2024-06-05

## 2024-06-05 RX ORDER — INSULIN ASPART 100 [IU]/ML
INJECTION, SOLUTION INTRAVENOUS; SUBCUTANEOUS
Qty: 70 ML | Refills: 3 | Status: SHIPPED | OUTPATIENT
Start: 2024-06-05

## 2024-06-05 RX ORDER — BLOOD SUGAR DIAGNOSTIC
STRIP MISCELLANEOUS
Qty: 400 EACH | Refills: 3 | Status: SHIPPED | OUTPATIENT
Start: 2024-06-05

## 2024-06-05 NOTE — TELEPHONE ENCOUNTER
Constanza response:    Ok. Accu-chek guide meter and supplies sent    I sent in both novolog vials for the pump and pens to use while off pump along with carb ratio instructions. Please let me know if we need to simplify the pen instructions for your use    I also sent tresiba to use while off of pump at 36 units every 24 hours    Please be sure you have glucagon on hand    This was sent to [facility] in [place]    Please let me know if any issues    ~Rhonda

## 2024-06-24 ENCOUNTER — PATIENT MESSAGE (OUTPATIENT)
Dept: ENDOCRINOLOGY | Age: 49
End: 2024-06-24

## 2024-06-25 RX ORDER — BLOOD-GLUCOSE SENSOR
EACH MISCELLANEOUS
Qty: 6 EACH | Refills: 3 | Status: SHIPPED | OUTPATIENT
Start: 2024-06-25

## 2024-06-25 RX ORDER — INSULIN GLARGINE 100 [IU]/ML
INJECTION, SOLUTION SUBCUTANEOUS
Qty: 15 ML | Refills: 1
Start: 2024-06-25

## 2024-06-25 NOTE — TELEPHONE ENCOUNTER
From: Beth Golden  To: Rhonda Bauer  Sent: 6/24/2024 3:41 PM EDT  Subject: [employer] Freestyle Ana    Is there any way to get a prescription for the Freestyle Ana? Since going manual, I have had higher than normal blood sugars and been hospitalized for DKA. They told me at the hyperbaric office that I could wear the Ana in the oxygen chamber. Thank you.

## 2024-06-25 NOTE — TELEPHONE ENCOUNTER
Constanza response:    It looks like your insurance denied the tresiba    Please increase the lantus from 28 units to 32 units.     Let me know if this causes any lows    If you want to call the  and see about getting on the cancel list in July for a virtual appointment to adjust your short acting insulin, I'm open to that and know it will be easier if you are on the [employer]    Ana 3 sent to pharmacy. Let us know when you are set up and we can give you the code to connect to our office     ~Rhonda

## 2024-06-25 NOTE — TELEPHONE ENCOUNTER
Constanza response:    I'm happy to send you a myra RX. Does it matter to you or the wound team if it is the myra 2 or the the myra 3? You may want to ensure you can download the justo on your cell phone    Moreover, it sounds like you are not getting the insulin you need. Can you confirm how much tresiba you are taking every day? Can you explain to me when you use novolog and how you determine how much to take? What is your typical dose of novolog? How many times a day?    Do you have blood sugar readings from a fingerstick meter that you can share by time of day?   What are your readings fasting in the morning?  Before lunch?  Before supper?  Before bed?    Let's adjust your insulin regimen while you are off pump to ensure it works better for you    ~Rhodna

## 2024-07-17 ENCOUNTER — PATIENT MESSAGE (OUTPATIENT)
Dept: ENDOCRINOLOGY | Age: 49
End: 2024-07-17

## 2024-07-17 DIAGNOSIS — E11.65 UNCONTROLLED TYPE 2 DIABETES MELLITUS WITH HYPERGLYCEMIA (HCC): ICD-10-CM

## 2024-07-17 RX ORDER — PROCHLORPERAZINE 25 MG/1
SUPPOSITORY RECTAL
Qty: 1 EACH | Refills: 3 | Status: SHIPPED | OUTPATIENT
Start: 2024-07-17

## 2024-07-17 RX ORDER — PROCHLORPERAZINE 25 MG/1
SUPPOSITORY RECTAL
Qty: 9 EACH | Refills: 3 | Status: SHIPPED | OUTPATIENT
Start: 2024-07-17

## 2024-07-17 NOTE — TELEPHONE ENCOUNTER
Constanza response:  Dexcom G6 sensor and transmitters sent to [facility] [place]    Please focus on engaging with the insulin pump to receive insulin based on how many carb you are about to eat as well as doing correction boluses .    Let me know if any issues    ~Rhonda

## 2024-07-17 NOTE — TELEPHONE ENCOUNTER
From: Beth Golden  To: Rhonda Bauer  Sent: 7/17/2024 12:49 AM EDT  Subject: Switch back to Dexcom    Good morning.  I asked for the Ana because I was told I could wear it i the HBO chamber only to be told the next day that I couldn't. I have discontinued HBO therapy because my insurance won't cover it even though they gave a prior authorization. I would like to switch back to the Dexcom so I can resume using the Omnipod. Would you consider sending a Rx for Dexcom sensors and a transmitter?  Thank you!

## 2024-09-08 NOTE — PROGRESS NOTES
[facility]    Dena Augustin PA-C  [facility]  [address], [address]  Jackie Rawls  Phone [phone]  Facsimile [phone]          Leah Tolentino is a 40 y.o. female seen 5/31/2022 for follow up evaluation of type 2 diabetes        Assessment and Plan:    In office COVID-19 PPE worn and precautions taken    Interpretation of 72 hour glucose monitor: At least 72 hours of data were reviewed. The patient utilizes a dexcom G6 continuous glucose monitoring system. The average glucose during the reviewed timeframe was 394 with a standard deviation of 22. There is a pattern of constant hyperglycemia. 1. Diabetic peripheral neuropathy associated with type 2 diabetes mellitus ([facility], [place] [address]  Patient with wildly uncontrolled type 2 diabetes intolerant to GLP-1 therapy. Restart basal insulin and prandial insulin, patient is taking metformin and Jardiance which she will continue. Patient states that after taking Humalog she has diarrhea. I have asked her to take a lower dose of prandial insulin +1 per 25 greater than 150 correction scale 3 times daily AC, I am suspicious the diarrhea is associated with her Trulicity that she no longer tolerates. Have asked her to restart basal insulin starting at 30 units daily with plans to uptitrate by 4 units every 4 days until fasting blood glucose 80-1 25 without hypoglycemia. Patient verbalizes understanding of plan    Patient intolerant to multiple GLP-1 therapies, try DPP-4 januvia 100mg    At today's visit we discussed sequela associated with uncontrolled diabetes including increased risk of stroke, heart attack, kidney failure, amputation, retinopathy, neuropathy, and nephropathy.   Patient was strongly encouraged to comply with treatment regimen as well as dietary and exercise recommendations to aid in control of this chronic disease to help prevent complications associated with uncontrolled diabetes. - AMB POC HEMOGLOBIN A1C  - JARDIANCE 25 MG tablet; Take 1 tablet by mouth daily  Dispense: 90 tablet; Refill: 3  - metFORMIN (GLUCOPHAGE-XR) 750 mg extended release tablet; Take 1 tablet by mouth daily  Dispense: 90 tablet; Refill: 3  - KERENDIA 10 MG TABS; Take 10 mg by mouth daily  Dispense: 90 tablet; Refill: 3  - JANUVIA 100 MG tablet; Take 1 tablet by mouth daily  Dispense: 90 tablet; Refill: 3  - Comprehensive Metabolic Panel; Future  - Microalbumin / Creatinine Urine Ratio; Future  - AL CONTINUOUS GLUCOSE MONITORING ANALYSIS I&R    2. Macroalbuminuric diabetic nephropathy ([facility] [address]  Patient with macroalbuminuria, previously prior prescribed Casie Passer, unable to obtain as she is not failed max dose lisinopril. Patient is taking 20 mg of lisinopril, failed 40 mg of lisinopril with hypotension. Patient is on max tolerated dose of ACE inhibitor, reorder Casie Passer recheck urine microalbumin today  - JARDIANCE 25 MG tablet; Take 1 tablet by mouth daily  Dispense: 90 tablet; Refill: 3  - KERENDIA 10 MG TABS; Take 10 mg by mouth daily  Dispense: 90 tablet; Refill: 3  - Microalbumin / Creatinine Urine Ratio; Future    3. Vitamin D deficiency  Restart twice weekly ergocalciferol  - ergocalciferol (ERGOCALCIFEROL) 1.25 MG (52914 UT) capsule; Take 1 capsule by mouth Twice a Week  Dispense: 12 capsule; Refill: 3  - Vitamin D 25 Hydroxy; Future              Robin Gardner was seen today for diabetes. Diagnoses and all orders for this visit:    Diabetic peripheral neuropathy associated with type 2 diabetes mellitus (HCC)  -     AMB POC HEMOGLOBIN A1C  -     JARDIANCE 25 MG tablet; Take 1 tablet by mouth daily  -     metFORMIN (GLUCOPHAGE-XR) 750 mg extended release tablet; Take 1 tablet by mouth daily  -     KERENDIA 10 MG TABS; Take 10 mg by mouth daily  -     JANUVIA 100 MG tablet; Take 1 tablet by mouth daily  -     Comprehensive Metabolic Panel;  Future  -     Microalbumin / Creatinine Urine Ratio; Future  -     RI CONTINUOUS GLUCOSE MONITORING ANALYSIS I&R    Macroalbuminuric diabetic nephropathy (HCC)  -     JARDIANCE 25 MG tablet; Take 1 tablet by mouth daily  -     KERENDIA 10 MG TABS; Take 10 mg by mouth daily  -     Microalbumin / Creatinine Urine Ratio; Future    Vitamin D deficiency  -     ergocalciferol (ERGOCALCIFEROL) 1.25 MG (79098 UT) capsule; Take 1 capsule by mouth Twice a Week  -     Vitamin D 25 Hydroxy; Future            History of Present Illness:    5/31/2022  Pt has been working from home due to right 4th toe infection without osteomyelitis complicated by bilateral edema    On max tolerated dose of lisinopril 20mg, had hypotensive events when taking 40mg lisinopril     Out of most diabetes meds for past few months, unable to obtain dexcom sensors        Current Regimen: Tresiba 64 units, Metformin XR 750mg twice daily, Jardiance 25mg, Humalog 20 before supper, correction  3/50>150        10/14/2021   COVID surge at start of school caused increased stress. Now eating no fast food and taking meds HS. Using Dexcom.                 6/10/2021   Patient returns for follow-up up with wildly uncontrolled type 2 diabetes.  At last office visit due to intolerance of GLP-1 and reticence to take basal bolus insulin she was changed from [facility] to [employer] with plans to uptitrate to max dose.  She  stopped uptitrating at 40 units and her average blood sugar has increased        2/23/2021   Pt compliant with therapy experiencing hyperglycemia, had benefit from GLP but was intolerant.  Even off of GLP-1 has daily nausea and near daily vomiting.         Reason for visit: follow-up of type 2 diabetes mellitus       History of Present Illness:        /DIABETES MELLITUS   Kim Hill here for follow-up of type 2 diabetes mellitus.        Date of diagnosis: gestational diabetes in 2004 and type 2 diabetes in 2007       Had diarrhea with IR metformin   Tresiba toujeo lantus   Poor results with multiple days of nausea and occasional vomiting.  She complains of general malaise.  She is profoundly frustrated by her poor progress.  She was evaluated by neurology who sent  her to physical therapy for her neuropathy. 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  She reports she eats very small amounts of food but she continues to have wildly abnormal  blood glucose level.  She is reticent to use her insulin because of concern for weight gain       08/18/2020   VIRTUAL VISIT       Pt meets for virtual follow up. Has been taking single dose prandial insulin since hospital discharge in April       humalog 3:15 carb ratio for supper often 9-30 units and using correction during the day and bedtime       11/18/2020   VIRTUAL VISIT   Flex Larsonls a 41 y. o. female who was seen by synchronous (real-time) audio-video technology on 11/18/2020 .  The patient provided consent for this audio-video interaction.   The [employer] platform was used. Patient was located at their job and provider in the office           Pt reports that when she started the ozempic challenge she started at 0.5mg dose, (taking only 1 week at 0.25 mg then uptitrating) notes \"black lines\" in vision of her left eye. Contacted  optho this week, onset late September, has not heard back).  Continue to have severe GI upset with ozempic       Diet:  No specific dietary precautions       Exercise:  no regular exercise       Body weight trend: increasing steadily        Diabetes education: The patient has received formal diabetes education (during pregnancy but not  since).           Home blood glucose monitoring frequency:   By review of CGM download over past 90 days  Average blood glucose 394 ± 22  Time in range 0%  High 100%, Very High 100%  Low 0%, Very Low 0%     Typical Standard Deviation   Fasting 391 23   AC lunch 394 22   AC supper 390 28   Bedtime 399 6     Blood glucose levels are uncontrolled, most significant elevations are constant       Hypoglycemia: never       Hemoglobin A1c:   3/16/2016: 12.7%. 3/19/2016: 11.2%.   7/20/2016:  10.8%. 11/30/2016: 10.7%. 5/24/2017: 11.1%.   11/12/2018: 11.9%.   02/19/2019: 6.9%   05/20/2019: 6.2%   08/05/2019: 7.6.%   11/05/2019: 9.8%   03/30/2020: 10.9%   09/11/2020: 7.2%                                           02/23/2021: 10.4%   02/23/2021: 8.1%   06/10/2021: 9.6%   10/8/2021: 11.9%  02/18/2022: 11.4%  05/31/2022: 13.1%           Diabetic complications:                     Retinopathy : Last eye exam was  Jan 2022 and demonstrated nonproliferative diabetic retinopathy.  Eye care specialist is [employer] [facility] [employer].                   Albuminuria/nephropathy :                           Urine microalbumin unknown (not recently checked, per patient).                             3/19/2016:  Serum creatinine 0.57.                           01/04/2019      Cr 0.62, , microalbumin/Cr ratio 49.9                           09/11/2020      Cr  0.65, ABZ 444, microalbumin/Cr ratio 320                          10/08/2021      Cr 0.74, GFR 98,   microalbumin/Cr ratio 5,334    02/18/2022 Cr 0.79, GFR 91                     Neuropathy :  Numbness and burning of feet.  She complains of left foot drop; Dr. Westley Thompson told her that she has peroneal nerve damage.       Other pertinent labs:                   Vitamin D                           08/05/2019      10.0                           09/11/2020      16.3                               03/12/2021      35.6                                  10/08/2021      14.6 off ergocalciferol                    Lipids :                            01/04/2019  TC- 165, LDL- 95, VLDL- 27,  HDL- 43, TG- 135                               03/13/2021  TC- 78, LDL- 21, VLDL- 25,  HDL- 32, TG- 148                           10/08/2021  TC- 156, LDL- 80 VLDL- 42,  HDL- 34, TG- 253                     PMDBZKT :                            VTA                           2019      1.690                                 2.972                               2020      0.888                                  10/08/2021      1.260                       BKLNJDDP Labs:               2019                           C-peptide: 2.1 (fasting and concurrent glucose of 271)                           KIARA-65: <5.0                           IA-2 Ab: 1.0       Pregnancy: A1 (one spontaneous  in )              Allergies & Medications:  Reviewed in chart. Review of Systems    Vital Signs:  /62   Pulse 68   Ht 5' 7\" (1.702 m)   Wt 171 lb 12.8 oz (77.9 kg)   SpO2 99%   BMI 26.91 kg/m²       Physical Exam  Constitutional:       Appearance: Normal appearance. HENT:      Head: Normocephalic. Neck:      Thyroid: No thyroid mass or thyromegaly. Vascular: No carotid bruit. Cardiovascular:      Rate and Rhythm: Normal rate and regular rhythm. Pulmonary:      Effort: Pulmonary effort is normal.      Breath sounds: Normal breath sounds. Abdominal:      Palpations: Abdomen is soft. Musculoskeletal:      Cervical back: Neck supple. Right lower leg: No edema. Left lower leg: No edema. Feet:      Right foot:      Protective Sensation: 3 sites tested. 3 sites sensed. Skin integrity: Skin integrity normal.      Left foot:      Protective Sensation: 3 sites tested. 3 sites sensed. Skin integrity: Skin integrity normal.      Comments: Surgically absent right fifth toe, 2 mm hematoma to dorsal DIP of right fourth toe, healed plantar skin and soft tissue infection of right fourth toe    Healing abrasion to distal left third toe just proximal to the nailbed  no heat, erythema, purulence, or sign of infection     Lymphadenopathy:      Cervical: No cervical adenopathy. Skin:     General: Skin is warm and dry.    Neurological:      General: No focal deficit present. Mental Status: She is alert. Sensory: Sensation is intact. Psychiatric:         Mood and Affect: Mood normal.         Behavior: Behavior normal.         Thought Content: Thought content normal.         Judgment: Judgment normal.             Return 6-8 week telephone and 3 month follow up, for Diabetes DM2 Follow-Up. Portions of this note were generated with the assistance of voice recogniton software. As such, some errors in transcription may be present. 08-Sep-2024 10:56

## (undated) DEVICE — BUTTON SWITCH PENCIL BLADE ELECTRODE, HOLSTER: Brand: EDGE

## (undated) DEVICE — SUT ETHLN 3-0 18IN FS1 BLK --

## (undated) DEVICE — PAD,ABDOMINAL,5"X9",ST,LF,25/BX: Brand: MEDLINE INDUSTRIES, INC.

## (undated) DEVICE — SYR LR LCK 1ML GRAD NSAF 30ML --

## (undated) DEVICE — Device

## (undated) DEVICE — PADDING CAST COHESIVE 4 YDX3 IN HND TEARABLE COTTON SPEC 100

## (undated) DEVICE — BNDG,ELSTC,MATRIX,STRL,4"X5YD,LF,HOOK&LP: Brand: MEDLINE

## (undated) DEVICE — GOWN,REINFORCED,POLY,AURORA,XXLARGE,STR: Brand: MEDLINE

## (undated) DEVICE — REM POLYHESIVE ADULT PATIENT RETURN ELECTRODE: Brand: VALLEYLAB

## (undated) DEVICE — SUTURE MCRYL SZ 3-0 L27IN ABSRB UD L19MM PS-2 3/8 CIR PRIM Y427H

## (undated) DEVICE — (D)PREP SKN CHLRAPRP APPL 26ML -- CONVERT TO ITEM 371833

## (undated) DEVICE — CURITY NON-ADHERENT STRIPS: Brand: CURITY

## (undated) DEVICE — GAUZE,SPONGE,4"X4",16PLY,STRL,LF,10/TRAY: Brand: MEDLINE